# Patient Record
Sex: FEMALE | Race: WHITE | NOT HISPANIC OR LATINO | Employment: OTHER | URBAN - METROPOLITAN AREA
[De-identification: names, ages, dates, MRNs, and addresses within clinical notes are randomized per-mention and may not be internally consistent; named-entity substitution may affect disease eponyms.]

---

## 2017-01-27 ENCOUNTER — OFFICE VISIT (OUTPATIENT)
Dept: FAMILY MEDICINE CLINIC | Facility: CLINIC | Age: 67
End: 2017-01-27

## 2017-01-27 VITALS
BODY MASS INDEX: 28.32 KG/M2 | WEIGHT: 170 LBS | HEART RATE: 65 BPM | OXYGEN SATURATION: 98 % | TEMPERATURE: 98.2 F | HEIGHT: 65 IN | RESPIRATION RATE: 16 BRPM | DIASTOLIC BLOOD PRESSURE: 80 MMHG | SYSTOLIC BLOOD PRESSURE: 120 MMHG

## 2017-01-27 DIAGNOSIS — G89.29 CHRONIC RIGHT SHOULDER PAIN: Primary | ICD-10-CM

## 2017-01-27 DIAGNOSIS — M25.511 CHRONIC RIGHT SHOULDER PAIN: Primary | ICD-10-CM

## 2017-01-27 PROCEDURE — 73030 X-RAY EXAM OF SHOULDER: CPT | Performed by: FAMILY MEDICINE

## 2017-01-27 PROCEDURE — 99213 OFFICE O/P EST LOW 20 MIN: CPT | Performed by: FAMILY MEDICINE

## 2017-01-27 NOTE — PATIENT INSTRUCTIONS
This is a very nice 66-year-old with tendinitis of the right shoulder.  I will ask for a physical therapy consultation but if this does not work I would like her to call me.

## 2017-01-27 NOTE — MR AVS SNAPSHOT
Suly Ware   1/27/2017 8:00 AM   Office Visit    Dept Phone:  [phone]   Encounter #:  [id number]    Provider:  Juan J Watts MD   Department:  [facility]                Your Full Care Plan              Today's Medication Changes          These changes are accurate as of: 1/27/17  8:35 AM.  If you have any questions, ask your nurse or doctor.               Stop taking medication(s)listed here:     mometasone 50 MCG/ACT nasal spray   Commonly known as:  NASONEX   Stopped by:  Juan J Watts MD                      Your Updated Medication List          This list is accurate as of: 1/27/17  8:35 AM.  Always use your most recent med list.                escitalopram 10 MG tablet   Commonly known as:  LEXAPRO       metroNIDAZOLE 1 % gel   Commonly known as:  METROGEL               We Performed the Following     Ambulatory Referral to Physical Therapy     XR Shoulder 2+ View Right (In Office)       You Were Diagnosed With        Codes Comments    Chronic right shoulder pain    -  Primary ICD-10-CM: M25.511, G89.29  ICD-9-CM: 719.41, 338.29       Instructions    This is a very nice 66-year-old with tendinitis of the right shoulder.  I will ask for a physical therapy consultation but if this does not work I would like her to call me.     Patient Instructions History      Upcoming Appointments     Visit Type Date Time Department    OFFICE VISIT 1/27/2017  8:00 AM [employer]    MAMMO KALEB SCREEN BILAT 2/10/2017  7:15 AM  [facility] [employer]      JANZZhart Signup     Our records indicate that you have an active [employer] account.    You can view your After Visit Summary by going to Press Play and logging in with your Zimride username and password.  If you don't have a Zimride username and password but a parent or guardian has access to your record, the parent or guardian should login with their own Zimride username and password and  "access your record to view the After Visit Summary.    If you have questions, you can email [email or web address] or call [phone] to talk to our MyChart staff.  Remember, Audio[employer] is NOT to be used for urgent needs.  For medical emergencies, dial 911.               Other Info from Your Visit           Your Appointments     Feb 10, 2017  7:15 AM EST   Mammo jelani screen bilat with JELANI MAMM 2   [facility] ([place])    [address] [facility] [address] [phone]            Bring any films and reports from outside facilities. Arrive 15 min prior to exam time. Do not apply any lotions, oils, body sprays, powders, perfumes, or deodorants on the day of the exam. Bring a current list of medications. Arrive 15 mi              Allergies     Sulfites        Reason for Visit     Shoulder Pain right shoulder x 2 mo       Vital Signs     Blood Pressure Pulse Temperature Respirations Height Weight    120/80 65 98.2 °F (36.8 °C) 16 65.2\" (165.6 cm) 170 lb (77.1 kg)    Oxygen Saturation Breastfeeding? Body Mass Index Smoking Status          98% No 28.12 kg/m2 Former Smoker        Problems and Diagnoses Noted     Chronic right shoulder pain      Results     XR Shoulder 2+ View Right (In Office)               "

## 2017-01-27 NOTE — PROGRESS NOTES
Subjective   Suly Ware is a 66 y.o. female presenting with   Chief Complaint   Patient presents with   • Shoulder Pain     right shoulder x 2 mo         HPI Comments: 66-year-old white female nonsmoker says that 6 years ago she had frozen left shoulder that was completely cured by physical therapy.  She does not remember any particular trauma, but she does do a lot of traveling with a 15-20 pound suitcase that she drags behind her through airports.  She says occasionally she will catch it on something and has felt some discomfort in her right shoulder.  Now she haspain and limited range of motion of the right shoulder.    She does not have any chest pain or shortness of breath.  She does not have any leg swelling.  She does have a history of deep vein thrombosis 15 years ago but says this does not feel anything like that.  She does not have any cough.  She also does not have any numbness or weakness in her right arm and she does not complain of any neck pain.    To further evaluate her complaint of restricted range of motion and chronic pain of her right shoulder I have requested a shoulder x-ray.  There are no old ones to compare this to.this x-ray does not show any problem.       The following portions of the patient's history were reviewed and updated as appropriate: current medications, past family history, past medical history, past social history, past surgical history and problem list.    Review of Systems   Musculoskeletal: Positive for arthralgias.   All other systems reviewed and are negative.      Objective   Physical Exam   Constitutional: She is oriented to person, place, and time. She appears well-developed and well-nourished. No distress.   HENT:   Head: Normocephalic and atraumatic.   Eyes: EOM are normal. Pupils are equal, round, and reactive to light.   Neck: Normal range of motion. Neck supple. No thyromegaly present.   Cardiovascular: Normal rate and regular rhythm.    Pulmonary/Chest:  Effort normal and breath sounds normal. No respiratory distress. She has no wheezes.   Musculoskeletal: She exhibits tenderness (she has tenderness to attempted internal and external rotation and extensionith limited range of motion.  There is no deformity of her shoulder or gross instability or erythema). She exhibits no edema or deformity.   Lymphadenopathy:     She has no cervical adenopathy.   Neurological: She is alert and oriented to person, place, and time. No cranial nerve deficit. Coordination normal.   Skin: Skin is warm.   Psychiatric: She has a normal mood and affect. Her behavior is normal.   Nursing note and vitals reviewed.      Assessment/Plan   Suly was seen today for shoulder pain.    Diagnoses and all orders for this visit:    Chronic right shoulder pain  -     XR Shoulder 2+ View Right (In Office)                   I would like him to return for another visit in 6 month(s)

## 2017-02-01 ENCOUNTER — HOSPITAL ENCOUNTER (OUTPATIENT)
Dept: PHYSICAL THERAPY | Facility: HOSPITAL | Age: 67
Setting detail: THERAPIES SERIES
Discharge: HOME | End: 2017-02-01

## 2017-02-01 DIAGNOSIS — Z78.9 DIFFICULTY WITH ACTIVITIES OF DAILY LIVING: ICD-10-CM

## 2017-02-01 DIAGNOSIS — M25.511 RIGHT SHOULDER PAIN, UNSPECIFIED CHRONICITY: Primary | ICD-10-CM

## 2017-02-01 PROCEDURE — 97162 PT EVAL MOD COMPLEX 30 MIN: CPT | Performed by: PHYSICAL THERAPIST

## 2017-02-01 PROCEDURE — 97110 THERAPEUTIC EXERCISES: CPT | Performed by: PHYSICAL THERAPIST

## 2017-02-01 NOTE — PROGRESS NOTES
Outpatient Physical Therapy Ortho Initial Evaluation/Treatment  [facility]     Patient Name: Suly Ware  : 1950  MRN: [id number]  Today's Date: 2017      Visit Date: 2017    Patient Active Problem List   Diagnosis   • Blues   • Headache, migraine   • FO (foramen ovale)   • PE (pulmonary embolism)   • DVT (deep venous thrombosis)   • Chronic right shoulder pain        Past Medical History   Diagnosis Date   • DVT (deep venous thrombosis)    • Pulmonary embolus         No past surgical history on file.    Visit Dx:     ICD-10-CM ICD-9-CM   1. Right shoulder pain, unspecified chronicity M25.511 719.41   2. Difficulty with activities of daily living R53.81 799.3             Patient History       17 0800          History    Chief Complaint Difficulty with daily activities;Pain  -MP      Type of Pain Shoulder pain  -MP      Date Current Problem(s) Began 16  -MP      Brief Description of Current Complaint Jessika reports pain mainly in the R shoulder, R trapezius and into the lower upper arm.   She reports no numbness and tingling.  She has difficulty with daily activites to include activities overhead, donning/doffing a sleeve, reaching into cabinets. lifting things and bearing weight into the R UE..    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 For leisure she enjoys knitting, crocheting and reading.  -MP      Patient seeing anyone else for problem(s)? Yes   Her PCP  -MP      How has patient tried to help current problem? Tried doing prevous rehab exercises for L frozen shoulder.  -MP      What clinical tests  have you had for this problem? X-ray  -MP      Results of Clinical Tests No issues  -MP      Pain     Pain Location Arm;Neck;Shoulder  -MP      Pain at Present 0  -MP      Pain at Best 0  -MP      Pain at Worst 7  -MP      Pain Frequency Intermittent  -MP      Pain Description Discomfort;Sharp;Stabbing  -MP      What Performance Factors Make the Current Problem(s) WORSE? Lifting, overhead activites, reaching, donning/doffing a sleeve  -MP      What Performance Factors Make the Current Problem(s) BETTER? zero  -MP      Fall Risk Assessment    Any falls in the past year: No  -MP      Services    Prior Rehab/Home Health Experiences No  -MP      Do you plan to receive Home Health services in the near future No  -MP      Daily Activities    Primary Language English  -MP      Pt Participated in POC and Goals Yes  -MP      Safety    Are you being hurt, hit, or frightened by anyone at home or in your life? No  -MP      Are you being neglected by a caregiver No  -MP        User Key  (r) = Recorded By, (t) = Taken By, (c) = Cosigned By    Initials Name Provider Type    MP Medardo Simmons, PT Physical Therapist                PT Ortho       02/01/17 1100    Subjective Pain    Able to rate subjective pain? yes  -MP    Pre-Treatment Pain Level 0  -MP    Post-Treatment Pain Level 1  -MP    Posture/Observations    Posture/Observations Comments In standing, lateral view of spine, forward head, Dowarger's at the CT junction, increased thoracic kyphosis and lumbr lordosis WNL.  Posterior view of spine displays R shoulder, scapula and ilium lower than the L.  A slight scoliosis with lumbar concavity on R and R rib hump with trunk flexion.  -MP    Sensation    Sensation WNL? WNL  -MP    Light Touch No apparent deficits   C2-T2 dermatomes  -MP    Additional Comments DTRs: B UEs were WNL/equal, Upper Motor Neuron Tests: Cogwheel Clunus and Dawson's sign were normal  -MP    Special Tests/Palpation    Special Tests/Palpation --   C spine  compression/decompression were normal  -MP    ROM (Range of Motion)    General ROM Detail In standing, shoulder elevation, scapular plane, L 161 degrees, R 147 degrees with end range pain, behind the neck L T3 spinous process, R T2 spinous process and behind the back L T4 spinous process and R T8 spinous process with end range pain.  PROM, supine, flexion L 165 degrees, capsular, R 132 degrees empty, ER L 87 degrees capsular, R 28 degrees spasm, Abduction L 146 degrees capsular, 115 degrees empty, IR 57 degrees capsular, R 50 degrees empty, Extension L 73 degrees capsular and R 70 degreees capslular  -MP    Pathomechanics    Pathomechanics Comments Palpation to the distal muscle belly of the R supraspiinatus was very tender.  -MP      User Key  (r) = Recorded By, (t) = Taken By, (c) = Cosigned By    Initials Name Provider Type    ZHOU Simmons PT Physical Therapist                Therapy Education       02/01/17 1142    Therapy Education    Given HEP  -MP    Program New  -MP    How Provided Written  -MP    Provided to Patient  -MP    Level of Understanding Other (comment)   written/illustrated instructions for PROM, R shoulder, flexion and wand ER were givien  -MP      User Key  (r) = Recorded By, (t) = Taken By, (c) = Cosigned By    Initials Name Provider Type    ZHOU Simmons PT Physical Therapist                PT OP Goals       02/01/17 1100          PT Short Term Goals    STG Date to Achieve 03/03/17  -MP      STG 1 Jessika progress her HEP with sleeper's stretch to improve passive IR range.  -MP      STG 1 Progress New  -MP      STG 2 Scapular and postural AROM/stretching activites are introduced to her therapeutic exercises.  -MP      STG 2 Progress New  -MP      STG 3 SPADI score improves to below 20% disability.  -MP      STG 3 Progress New  -MP      Long Term Goals    LTG Date to Achieve 03/31/17  -MP      LTG 1 Jessika has no pain with normal ADL performance with the R shoulder.  -MP      LTG 1 Progress  New  -MP      LTG 2 AROM and PROM of the R shoulder equals the L.  -MP      LTG 2 Progress New  -MP      LTG 3 Jessika is independent with a complete HEP and self care education for the R shoulder.  -MP      LTG 3 Progress New  -MP      LTG 4 SPADI score improves to below 10% disability.  -MP      LTG 4 Progress New  -MP      Time Calculation    PT Goal Re-Cert Due Date 03/03/17  -MP        User Key  (r) = Recorded By, (t) = Taken By, (c) = Cosigned By    Initials Name Provider Type    ZHOU Simmons PT Physical Therapist                PT Assessment/Plan       02/01/17 1148          PT Assessment    Functional Limitations Decreased safety during functional activities;Limitations in community activities;Limitations in functional capacity and performance;Performance in leisure activities  -MP      Impairments Pain;Range of motion;Posture;Impaired muscle length;Joint mobility;Poor body mechanics  -MP      Assessment Comments R shoulder strain possibly involving the R rotator cuff  -MP      Please refer to paper survey for additional self-reported information Yes  -MP      Rehab Potential Good  -MP      Patient/caregiver participated in establishment of treatment plan and goals Yes  -MP      Patient would benefit from skilled therapy intervention Yes  -MP      PT Plan    PT Frequency 2x/week  -MP      Predicted Duration of Therapy Intervention (days/wks) 4-8 weeks  -MP      Planned CPT's? PT EVAL: 23448;PT RE-EVAL: 92748;PT THER PROC EA 15 MIN: 24172;PT MANUAL THERAPY EA 15 MIN: 09352;PT SELF CARE/HOME MGMT/TRAIN EA 15: 40975;PT ELECTRICAL STIM UNATTEND: ;PT ULTRASOUND EA 15 MIN: 99820  -MP      PT Plan Comments Jessika is to progress her therapeutic exercises with the sleeper stretch, passive IR of the R shoulder, next visit.  -MP        User Key  (r) = Recorded By, (t) = Taken By, (c) = Cosigned By    Initials Name Provider Type    ZHOU Simmons PT Physical Therapist                Exercises       02/01/17 1100           Subjective Pain    Able to rate subjective pain? yes  -MP      Pre-Treatment Pain Level 0  -MP      Post-Treatment Pain Level 1  -MP      Exercise 1    Exercise Name 1 Refer to land flow sheet  -MP        User Key  (r) = Recorded By, (t) = Taken By, (c) = Cosigned By    Initials Name Provider Type    ZHOU Simmons PT Physical Therapist                 Outcome Measures       02/01/17 1100          Other Outcome Measure Tool Used    Other Outcome Measure Tool Comments SPADI 35/80 for a 27% disability  -MP      Functional Assessment    Outcome Measure Options Other Outcome Measure  -MP        User Key  (r) = Recorded By, (t) = Taken By, (c) = Cosigned By    Initials Name Provider Type    ZHOU Simmons PT Physical Therapist            Time Calculation:   Start Time: 0845  Stop Time: 0930  Time Calculation (min): 45 min     Therapy Charges for Today     Code Description Service Date Service Provider Modifiers Qty    54610205488 HC PT EVAL MOD COMPLEXITY 2 2/1/2017 Medardo Simmons PT GP 1    33627293291 HC PT THER PROC EA 15 MIN 2/1/2017 Medardo Simmons PT GP 1          PT G-Codes  Outcome Measure Options: Other Outcome Measure         Medardo Simmons PT  2/1/2017

## 2017-02-09 ENCOUNTER — HOSPITAL ENCOUNTER (OUTPATIENT)
Dept: PHYSICAL THERAPY | Facility: HOSPITAL | Age: 67
Setting detail: THERAPIES SERIES
Discharge: HOME | End: 2017-02-09

## 2017-02-09 DIAGNOSIS — Z78.9 DIFFICULTY WITH ACTIVITIES OF DAILY LIVING: ICD-10-CM

## 2017-02-09 DIAGNOSIS — M25.511 RIGHT SHOULDER PAIN, UNSPECIFIED CHRONICITY: Primary | ICD-10-CM

## 2017-02-09 PROCEDURE — 97140 MANUAL THERAPY 1/> REGIONS: CPT | Performed by: PHYSICAL THERAPIST

## 2017-02-09 PROCEDURE — 97110 THERAPEUTIC EXERCISES: CPT | Performed by: PHYSICAL THERAPIST

## 2017-02-09 NOTE — PROGRESS NOTES
Outpatient Physical Therapy Ortho Treatment Note  [facility]     Patient Name: Suly Ware  : 1950  MRN: [id number]  Today's Date: 2017      Visit Date: 2017    Visit Dx:    ICD-10-CM ICD-9-CM   1. Right shoulder pain, unspecified chronicity M25.511 719.41   2. Difficulty with activities of daily living R53.81 799.3       Patient Active Problem List   Diagnosis   • Blues   • Headache, migraine   • FO (foramen ovale)   • PE (pulmonary embolism)   • DVT (deep venous thrombosis)   • Chronic right shoulder pain        Past Medical History   Diagnosis Date   • DVT (deep venous thrombosis)    • Pulmonary embolus         No past surgical history on file.            PT Assessment/Plan       17 1504          PT Assessment    Assessment Comments Jessika tolerated treatment well.  She progressed therapeutic exercises and her HEP.  -MP      PT Plan    PT Plan Comments Begin re-educating scapular stabilizers and start behind the back stretch next visit  -MP        User Key  (r) = Recorded By, (t) = Taken By, (c) = Cosigned By    Initials Name Provider Type    ZHOU Simmons, PT Physical Therapist                Exercises       17 1500          Subjective Comments    Subjective Comments Jessika stated that passive flexion has improved but ER is stubborn.  -MP      Subjective Pain    Able to rate subjective pain? yes  -MP      Pre-Treatment Pain Level 0  -MP      Post-Treatment Pain Level 1  -MP      Exercise 1    Exercise Name 1 Refer to land flow sheet  -MP      Exercise 2    Exercise Name 2 Progressed therapeutic exercises with passive IR, sleeper's stretch, and pulley flexion and scaption.    -MP        User Key  (r) = Recorded By, (t) = Taken By, (c) = Cosigned By    Initials Name Provider Type    ZHOU Simmons, PT Physical Therapist             Manual Rx (last 36 hours)      Manual Treatments       17 1500          Manual Rx 1    Manual Rx 1 Location R Glenohumeral Joint  -MP       Manual Rx 1 Type oscillations  -MP      Manual Rx 1 Duration 5 minutes  -MP      Manual Rx 2    Manual Rx 2 Location R glenohumeral joint  -MP      Manual Rx 2 Type inferior, posterior and anterior glides  -MP      Manual Rx 2 Grade 3  -MP      Manual Rx 2 Duration 5 x 3 each  -MP        User Key  (r) = Recorded By, (t) = Taken By, (c) = Cosigned By    Initials Name Provider Type    MP Medardo Simmons, PT Physical Therapist                PT OP Goals       02/09/17 1500 02/01/17 1100       PT Short Term Goals    STG Date to Achieve 03/03/17  -MP 03/03/17  -MP     STG 1 Jessika progress her HEP with sleeper's stretch to improve passive IR range.  -MP Jessika progress her HEP with sleeper's stretch to improve passive IR range.  -MP     STG 1 Progress Met  -MP New  -MP     STG 2 Scapular and postural AROM/stretching activites are introduced to her therapeutic exercises.  -MP Scapular and postural AROM/stretching activites are introduced to her therapeutic exercises.  -MP     STG 2 Progress Ongoing  -MP New  -MP     STG 3 SPADI score improves to below 20% disability.  -MP SPADI score improves to below 20% disability.  -MP     STG 3 Progress Ongoing  -MP New  -MP     Long Term Goals    LTG Date to Achieve 03/31/17  -MP 03/31/17  -MP     LTG 1 Jessika has no pain with normal ADL performance with the R shoulder.  -MP Jessika has no pain with normal ADL performance with the R shoulder.  -MP     LTG 1 Progress Ongoing  -MP New  -MP     LTG 2 AROM and PROM of the R shoulder equals the L.  -MP AROM and PROM of the R shoulder equals the L.  -MP     LTG 2 Progress Ongoing  -MP New  -MP     LTG 3 Jessika is independent with a complete HEP and self care education for the R shoulder.  -MP Jessika is independent with a complete HEP and self care education for the R shoulder.  -MP     LTG 3 Progress Ongoing  -MP New  -MP     LTG 4 SPADI score improves to below 10% disability.  -MP SPADI score improves to below 10% disability.  -MP     LTG 4  Progress Ongoing  -MP New  -MP     Time Calculation    PT Goal Re-Cert Due Date  03/03/17  -MP       User Key  (r) = Recorded By, (t) = Taken By, (c) = Cosigned By    Initials Name Provider Type    ZHOU Simmons PT Physical Therapist                Therapy Education       02/09/17 1503    Therapy Education    Given HEP  -MP    Program New  -MP    How Provided Written  -MP    Provided to Patient  -MP    Level of Understanding Other (comment)   Passive IR stretch, pulley flexion and scaption added to her HEP.  -MP      User Key  (r) = Recorded By, (t) = Taken By, (c) = Cosigned By    Initials Name Provider Type    ZHOU Simmons PT Physical Therapist          Time Calculation:   Start Time: 1415  Stop Time: 1500  Time Calculation (min): 45 min    Therapy Charges for Today     Code Description Service Date Service Provider Modifiers Qty    09383185617 HC PT THER PROC EA 15 MIN 2/9/2017 Medardo Simmons PT GP 2    28388569261 HC PT MANUAL THERAPY EA 15 MIN 2/9/2017 Medardo Simmons PT GP 1          Medardo Simmons PT  2/9/2017

## 2017-02-10 ENCOUNTER — HOSPITAL ENCOUNTER (OUTPATIENT)
Dept: MAMMOGRAPHY | Facility: HOSPITAL | Age: 67
Discharge: HOME | End: 2017-02-10
Attending: OBSTETRICS & GYNECOLOGY | Admitting: OBSTETRICS & GYNECOLOGY

## 2017-02-10 DIAGNOSIS — Z12.31 VISIT FOR SCREENING MAMMOGRAM: ICD-10-CM

## 2017-02-10 PROCEDURE — G0202 SCR MAMMO BI INCL CAD: HCPCS

## 2017-02-13 ENCOUNTER — HOSPITAL ENCOUNTER (OUTPATIENT)
Dept: PHYSICAL THERAPY | Facility: HOSPITAL | Age: 67
Setting detail: THERAPIES SERIES
Discharge: HOME | End: 2017-02-13

## 2017-02-13 DIAGNOSIS — Z78.9 DIFFICULTY WITH ACTIVITIES OF DAILY LIVING: ICD-10-CM

## 2017-02-13 DIAGNOSIS — M25.511 RIGHT SHOULDER PAIN, UNSPECIFIED CHRONICITY: Primary | ICD-10-CM

## 2017-02-13 PROCEDURE — 97140 MANUAL THERAPY 1/> REGIONS: CPT | Performed by: PHYSICAL THERAPIST

## 2017-02-13 PROCEDURE — 97110 THERAPEUTIC EXERCISES: CPT | Performed by: PHYSICAL THERAPIST

## 2017-02-13 NOTE — PROGRESS NOTES
Outpatient Physical Therapy Ortho Treatment Note  [facility]     Patient Name: Suly Ware  : 1950  MRN: [id number]  Today's Date: 2017      Visit Date: 2017    Visit Dx:    ICD-10-CM ICD-9-CM   1. Right shoulder pain, unspecified chronicity M25.511 719.41   2. Difficulty with activities of daily living R53.81 799.3       Patient Active Problem List   Diagnosis   • Blues   • Headache, migraine   • FO (foramen ovale)   • PE (pulmonary embolism)   • DVT (deep venous thrombosis)   • Chronic right shoulder pain        Past Medical History   Diagnosis Date   • DVT (deep venous thrombosis)    • Pulmonary embolus         No past surgical history on file.            PT Assessment/Plan       17 0852 17 1504       PT Assessment    Assessment Comments Suly is noticing flexibility improvements.  She tolerated introduction to scapular retraction and ER PREs without increasing pain afterwards.  -MP Jessika tolerated treatment well.  She progressed therapeutic exercises and her HEP.  -MP     PT Plan    PT Plan Comments Progress her HEP with the scapular rowing, green or blue theraband, and sidelying ER with dumbell, 2 or 3 lbs., if she does well between now and 2017.  -MP Begin re-educating scapular stabilizers and start behind the back stretch next visit  -MP       User Key  (r) = Recorded By, (t) = Taken By, (c) = Cosigned By    Initials Name Provider Type    ZHOU Simmons, PT Physical Therapist                    Exercises       17 0800          Subjective Comments    Subjective Comments Jessika reports seeing improved flexibility but a little more soreness today.  She assisted in chores at her home over the weekend and the end result was increased soreness.  -MP      Subjective Pain    Able to rate subjective pain? yes  -MP      Pre-Treatment Pain Level 2  -MP      Exercise 1    Exercise Name 1 Refer to land flow sheet  -MP      Exercise 2    Exercise Name 2  Progressed therapeutic exercises with pulley behind the back stretch 30 s x 5, scapular rowing with red theraband 10 x 3 and sidelying ER PRE with 2 lbs. 10 x 3.  -MP        User Key  (r) = Recorded By, (t) = Taken By, (c) = Cosigned By    Initials Name Provider Type    MP Medardo Simmons, PT Physical Therapist             Manual Rx (last 36 hours)      Manual Treatments       02/13/17 0700          Manual Rx 1    Manual Rx 1 Location R Glenohumeral Joint  -MP      Manual Rx 1 Type oscillations  -MP      Manual Rx 1 Duration 5 minutes  -MP      Manual Rx 2    Manual Rx 2 Location R glenohumeral joint  -MP      Manual Rx 2 Type inferior, posterior and anterior glides  -MP      Manual Rx 2 Grade 3  -MP      Manual Rx 2 Duration 5 x 3 each  -MP        User Key  (r) = Recorded By, (t) = Taken By, (c) = Cosigned By    Initials Name Provider Type    ZHOU Simmons, PT Physical Therapist                PT OP Goals       02/13/17 0800 02/09/17 1500 02/01/17 1100    PT Short Term Goals    STG Date to Achieve 03/03/17  -MP 03/03/17  -MP 03/03/17  -MP    STG 1 Jessika progress her HEP with sleeper's stretch to improve passive IR range.  -MP Jessika progress her HEP with sleeper's stretch to improve passive IR range.  -MP Jessika progress her HEP with sleeper's stretch to improve passive IR range.  -MP    STG 1 Progress Met  -MP Met  -MP New  -MP    STG 2 Scapular and postural AROM/stretching activites are introduced to her therapeutic exercises.  -MP Scapular and postural AROM/stretching activites are introduced to her therapeutic exercises.  -MP Scapular and postural AROM/stretching activites are introduced to her therapeutic exercises.  -MP    STG 2 Progress Met  -MP Ongoing  -MP New  -MP    STG 3 SPADI score improves to below 20% disability.  -MP SPADI score improves to below 20% disability.  -MP SPADI score improves to below 20% disability.  -MP    STG 3 Progress Ongoing  -MP Ongoing  -MP New  -MP    Long Term Goals    LTG Date to  Achieve 03/31/17  -MP 03/31/17  -MP 03/31/17  -MP    LTG 1 Jessika has no pain with normal ADL performance with the R shoulder.  -MP Jessika has no pain with normal ADL performance with the R shoulder.  -MP Jessika has no pain with normal ADL performance with the R shoulder.  -MP    LTG 1 Progress Ongoing  -MP Ongoing  -MP New  -MP    LTG 2 AROM and PROM of the R shoulder equals the L.  -MP AROM and PROM of the R shoulder equals the L.  -MP AROM and PROM of the R shoulder equals the L.  -MP    LTG 2 Progress Ongoing  -MP Ongoing  -MP New  -MP    LTG 3 Jessika is independent with a complete HEP and self care education for the R shoulder.  -MP Jessika is independent with a complete HEP and self care education for the R shoulder.  -MP Jessika is independent with a complete HEP and self care education for the R shoulder.  -MP    LTG 3 Progress Ongoing  -MP Ongoing  -MP New  -MP    LTG 4 SPADI score improves to below 10% disability.  -MP SPADI score improves to below 10% disability.  -MP SPADI score improves to below 10% disability.  -MP    LTG 4 Progress Ongoing  -MP Ongoing  -MP New  -MP    Time Calculation    PT Goal Re-Cert Due Date   03/03/17  -MP      User Key  (r) = Recorded By, (t) = Taken By, (c) = Cosigned By    Initials Name Provider Type    ZHOU Simmons PT Physical Therapist                Therapy Education       02/13/17 0850    Therapy Education    Given HEP  -MP    Program New  -MP    How Provided Written  -MP    Provided to Patient  -MP    Level of Understanding Other (comment)   Behind the back stretch was added to the HEP.  -MP      02/09/17 1503    Therapy Education    Given HEP  -MP    Program New  -MP    How Provided Written  -MP    Provided to Patient  -MP    Level of Understanding Other (comment)   Passive IR stretch, pulley flexion and scaption added to her HEP.  -MP      User Key  (r) = Recorded By, (t) = Taken By, (c) = Cosigned By    Initials Name Provider Type    ZHOU Simmons PT Physical Therapist         Time Calculation:   Start Time: 0800  Stop Time: 0845  Time Calculation (min): 45 min    Therapy Charges for Today     Code Description Service Date Service Provider Modifiers Qty    11010588221 HC PT THER PROC EA 15 MIN 2/13/2017 Medardo Simmons, PT GP 2    84353369139 HC PT MANUAL THERAPY EA 15 MIN 2/13/2017 Medardo Simmons PT GP 1          Medardo Simmons, PT  2/13/2017

## 2017-02-16 ENCOUNTER — HOSPITAL ENCOUNTER (OUTPATIENT)
Dept: PHYSICAL THERAPY | Facility: HOSPITAL | Age: 67
Setting detail: THERAPIES SERIES
Discharge: HOME | End: 2017-02-16

## 2017-02-16 DIAGNOSIS — M25.511 RIGHT SHOULDER PAIN, UNSPECIFIED CHRONICITY: Primary | ICD-10-CM

## 2017-02-16 DIAGNOSIS — Z78.9 DIFFICULTY WITH ACTIVITIES OF DAILY LIVING: ICD-10-CM

## 2017-02-16 PROCEDURE — 97140 MANUAL THERAPY 1/> REGIONS: CPT | Performed by: PHYSICAL THERAPIST

## 2017-02-16 PROCEDURE — 97110 THERAPEUTIC EXERCISES: CPT | Performed by: PHYSICAL THERAPIST

## 2017-02-16 NOTE — PROGRESS NOTES
Outpatient Physical Therapy Ortho Treatment Note  [facility]     Patient Name: Suly Ware  : 1950  MRN: [id number]  Today's Date: 2017      Visit Date: 2017    Visit Dx:    ICD-10-CM ICD-9-CM   1. Right shoulder pain, unspecified chronicity M25.511 719.41   2. Difficulty with activities of daily living R53.81 799.3       Patient Active Problem List   Diagnosis   • Blues   • Headache, migraine   • FO (foramen ovale)   • PE (pulmonary embolism)   • DVT (deep venous thrombosis)   • Chronic right shoulder pain        Past Medical History   Diagnosis Date   • DVT (deep venous thrombosis)    • Pulmonary embolus         No past surgical history on file.                PT Assessment/Plan       17 0941 17 0852 17 1504    PT Assessment    Assessment Comments Jessika is responding well to current treatment.  No problems with theraband activities today.  -MP Suly is noticing flexibility improvements.  She tolerated introduction to scapular retraction and ER PREs without increasing pain afterwards.  -MP Jessika tolerated treatment well.  She progressed therapeutic exercises and her HEP.  -MP    PT Plan    PT Plan Comments She is out of town for one week and will resume PT when she is back in town  -MP Progress her HEP with the scapular rowing, green or blue theraband, and sidelying ER with dumbell, 2 or 3 lbs., if she does well between now and 2017.  -MP Begin re-educating scapular stabilizers and start behind the back stretch next visit  -MP      User Key  (r) = Recorded By, (t) = Taken By, (c) = Cosigned By    Initials Name Provider Type    ZHOU Simmons, PT Physical Therapist                Exercises       17 0900          Subjective Comments    Subjective Comments Jessika reported having scapular soreness after her last visit that was helped with a cold pack.  Since then she has not had pain.  -MP      Subjective Pain    Able to rate subjective pain? yes   -MP      Pre-Treatment Pain Level 0  -MP      Post-Treatment Pain Level 1  -MP      Exercise 1    Exercise Name 1 Refer to land flow sheet  -MP      Exercise 2    Exercise Name 2 Progressed therapeutic exercises with resisted rowing red theraband 10 x 2 and standing ER, red theraband, 10 x 3.  -MP        User Key  (r) = Recorded By, (t) = Taken By, (c) = Cosigned By    Initials Name Provider Type    MP Medardo Simmons, PT Physical Therapist                  PT OP Goals       02/16/17 0900 02/13/17 0800 02/09/17 1500    PT Short Term Goals    STG Date to Achieve 03/03/17  -MP 03/03/17  -MP 03/03/17  -MP    STG 1 Jessika progress her HEP with sleeper's stretch to improve passive IR range.  -MP Jessika progress her HEP with sleeper's stretch to improve passive IR range.  -MP Jessika progress her HEP with sleeper's stretch to improve passive IR range.  -MP    STG 1 Progress Met  -MP Met  -MP Met  -MP    STG 2 Scapular and postural AROM/stretching activites are introduced to her therapeutic exercises.  -MP Scapular and postural AROM/stretching activites are introduced to her therapeutic exercises.  -MP Scapular and postural AROM/stretching activites are introduced to her therapeutic exercises.  -MP    STG 2 Progress Met  -MP Met  -MP Ongoing  -MP    STG 3 SPADI score improves to below 20% disability.  -MP SPADI score improves to below 20% disability.  -MP SPADI score improves to below 20% disability.  -MP    STG 3 Progress Ongoing  -MP Ongoing  -MP Ongoing  -MP    Long Term Goals    LTG Date to Achieve 03/31/17  -MP 03/31/17  -MP 03/31/17  -MP    LTG 1 Jessika has no pain with normal ADL performance with the R shoulder.  -MP Jessika has no pain with normal ADL performance with the R shoulder.  -MP Jessika has no pain with normal ADL performance with the R shoulder.  -MP    LTG 1 Progress Ongoing  -MP Ongoing  -MP Ongoing  -MP    LTG 2 AROM and PROM of the R shoulder equals the L.  -MP AROM and PROM of the R shoulder equals the L.  -MP  AROM and PROM of the R shoulder equals the L.  -MP    LTG 2 Progress Ongoing  -MP Ongoing  -MP Ongoing  -MP    LTG 3 Jessika is independent with a complete HEP and self care education for the R shoulder.  -MP Jessika is independent with a complete HEP and self care education for the R shoulder.  -MP Jessika is independent with a complete HEP and self care education for the R shoulder.  -MP    LTG 3 Progress Ongoing  -MP Ongoing  -MP Ongoing  -MP    LTG 4 SPADI score improves to below 10% disability.  -MP SPADI score improves to below 10% disability.  -MP SPADI score improves to below 10% disability.  -MP    LTG 4 Progress Ongoing  -MP Ongoing  -MP Ongoing  -MP      User Key  (r) = Recorded By, (t) = Taken By, (c) = Cosigned By    Initials Name Provider Type    ZHOU Simmons PT Physical Therapist                Therapy Education       02/16/17 0941    Therapy Education    Given HEP  -MP    Program New  -MP    How Provided Written  -MP    Provided to Patient  -MP    Level of Understanding Other (comment)   Issued red theraband for her to do rowing and ER 3 x per week  -MP      02/13/17 0850    Therapy Education    Given HEP  -MP    Program New  -MP    How Provided Written  -MP    Provided to Patient  -MP    Level of Understanding Other (comment)   Behind the back stretch was added to the HEP.  -MP      02/09/17 1503    Therapy Education    Given HEP  -MP    Program New  -MP    How Provided Written  -MP    Provided to Patient  -MP    Level of Understanding Other (comment)   Passive IR stretch, pulley flexion and scaption added to her HEP.  -MP      User Key  (r) = Recorded By, (t) = Taken By, (c) = Cosigned By    Initials Name Provider Type    ZHOU Simmons PT Physical Therapist        Time Calculation:   Start Time: 0845  Stop Time: 0930  Time Calculation (min): 45 min    Therapy Charges for Today     Code Description Service Date Service Provider Modifiers Qty    39719830060 HC PT MANUAL THERAPY EA 15 MIN 2/16/2017  Medardo Simmons, PT GP 1    46567495802 HC PT THER PROC EA 15 MIN 2/16/2017 Medardo Simmons, PT GP 2          Medardo Simmons, PT  2/16/2017

## 2017-02-20 ENCOUNTER — OFFICE VISIT (OUTPATIENT)
Dept: FAMILY MEDICINE CLINIC | Facility: CLINIC | Age: 67
End: 2017-02-20

## 2017-02-20 VITALS
WEIGHT: 169.3 LBS | OXYGEN SATURATION: 98 % | HEIGHT: 65 IN | DIASTOLIC BLOOD PRESSURE: 78 MMHG | TEMPERATURE: 99.2 F | RESPIRATION RATE: 16 BRPM | HEART RATE: 76 BPM | BODY MASS INDEX: 28.21 KG/M2 | SYSTOLIC BLOOD PRESSURE: 118 MMHG

## 2017-02-20 DIAGNOSIS — J11.1 INFLUENZA: Primary | ICD-10-CM

## 2017-02-20 DIAGNOSIS — R68.89 FLU-LIKE SYMPTOMS: Primary | ICD-10-CM

## 2017-02-20 LAB
EXPIRATION DATE: ABNORMAL
FLUAV AG NPH QL: ABNORMAL
FLUBV AG NPH QL: ABNORMAL
INTERNAL CONTROL: ABNORMAL
Lab: ABNORMAL

## 2017-02-20 PROCEDURE — 99213 OFFICE O/P EST LOW 20 MIN: CPT | Performed by: INTERNAL MEDICINE

## 2017-02-20 PROCEDURE — 87804 INFLUENZA ASSAY W/OPTIC: CPT | Performed by: INTERNAL MEDICINE

## 2017-02-20 RX ORDER — GUAIFENESIN AND CODEINE PHOSPHATE 100; 10 MG/5ML; MG/5ML
5 SOLUTION ORAL 3 TIMES DAILY PRN
Qty: 180 ML | Refills: 0 | Status: SHIPPED | OUTPATIENT
Start: 2017-02-20 | End: 2017-05-24

## 2017-02-20 RX ORDER — OSELTAMIVIR PHOSPHATE 75 MG/1
75 CAPSULE ORAL 2 TIMES DAILY
Qty: 10 CAPSULE | Refills: 0 | Status: SHIPPED | OUTPATIENT
Start: 2017-02-20 | End: 2017-05-24

## 2017-02-20 NOTE — PROGRESS NOTES
"Subjective   Patient ID: Suly Ware is a 66 y.o. female presents with   Chief Complaint   Patient presents with   • URI     day 10   • Fever     body aches, cough       HPI - this patient has been sick for a few days with fever congestion cough headache body ache.  She travels on airplanes frequently.  She tested positive for influenza A    Assessment plan    Influenza A-Tamiflu 75 mg twice daily for 5 days.  Codeine guaifenesin cough syrup Tylenol fluids and rest        Allergies   Allergen Reactions   • Sulfites        The following portions of the patient's history were reviewed and updated as appropriate: allergies, current medications, past family history, past medical history, past social history, past surgical history and problem list.      Review of Systems   Constitutional: Positive for fatigue and fever.   HENT: Positive for congestion.    Eyes: Negative.    Respiratory: Positive for cough.    Cardiovascular: Negative.        Objective     Vitals:    02/20/17 1258   BP: 118/78   Pulse: 76   Resp: 16   Temp: 99.2 °F (37.3 °C)   TempSrc: Oral   SpO2: 98%   Weight: 169 lb 4.8 oz (76.8 kg)   Height: 65\" (165.1 cm)         Physical Exam   Constitutional: She is oriented to person, place, and time. She appears well-developed and well-nourished.   HENT:   Head: Normocephalic and atraumatic.   Eyes: EOM are normal. Pupils are equal, round, and reactive to light.   Cardiovascular: Normal rate and regular rhythm.    Pulmonary/Chest: Effort normal and breath sounds normal.   Neurological: She is alert and oriented to person, place, and time.   Psychiatric: She has a normal mood and affect. Her behavior is normal.   Nursing note and vitals reviewed.        Suly was seen today for uri and fever.    Diagnoses and all orders for this visit:    Influenza    Other orders  -     oseltamivir (TAMIFLU) 75 MG capsule; Take 1 capsule by mouth 2 (Two) Times a Day.  -     guaifenesin-codeine (GUAIFENESIN AC) 100-10 " MG/5ML liquid; Take 5 mL by mouth 3 (Three) Times a Day As Needed for cough.        Call or return to clinic prn if these symptoms worsen or fail to improve as anticipated.

## 2017-02-28 ENCOUNTER — HOSPITAL ENCOUNTER (OUTPATIENT)
Dept: PHYSICAL THERAPY | Facility: HOSPITAL | Age: 67
Setting detail: THERAPIES SERIES
Discharge: HOME | End: 2017-02-28

## 2017-02-28 DIAGNOSIS — Z78.9 DIFFICULTY WITH ACTIVITIES OF DAILY LIVING: ICD-10-CM

## 2017-02-28 DIAGNOSIS — M25.511 RIGHT SHOULDER PAIN, UNSPECIFIED CHRONICITY: Primary | ICD-10-CM

## 2017-02-28 PROCEDURE — 97140 MANUAL THERAPY 1/> REGIONS: CPT | Performed by: PHYSICAL THERAPIST

## 2017-02-28 PROCEDURE — 97110 THERAPEUTIC EXERCISES: CPT | Performed by: PHYSICAL THERAPIST

## 2017-03-02 ENCOUNTER — HOSPITAL ENCOUNTER (OUTPATIENT)
Dept: PHYSICAL THERAPY | Facility: HOSPITAL | Age: 67
Setting detail: THERAPIES SERIES
Discharge: HOME | End: 2017-03-02

## 2017-03-02 DIAGNOSIS — Z78.9 DIFFICULTY WITH ACTIVITIES OF DAILY LIVING: ICD-10-CM

## 2017-03-02 DIAGNOSIS — M25.511 RIGHT SHOULDER PAIN, UNSPECIFIED CHRONICITY: Primary | ICD-10-CM

## 2017-03-02 PROCEDURE — 97140 MANUAL THERAPY 1/> REGIONS: CPT | Performed by: PHYSICAL THERAPIST

## 2017-03-02 PROCEDURE — 97110 THERAPEUTIC EXERCISES: CPT | Performed by: PHYSICAL THERAPIST

## 2017-03-02 NOTE — PROGRESS NOTES
Outpatient Physical Therapy Ortho Treatment Note  [facility]     Patient Name: Suly Ware  : 1950  MRN: [id number]  Today's Date: 3/2/2017      Visit Date: 2017    Visit Dx:    ICD-10-CM ICD-9-CM   1. Right shoulder pain, unspecified chronicity M25.511 719.41   2. Difficulty with activities of daily living R53.81 799.3       Patient Active Problem List   Diagnosis   • Headache, migraine   • PE (pulmonary embolism)   • DVT (deep venous thrombosis)   • Chronic right shoulder pain   • Influenza        Past Medical History   Diagnosis Date   • DVT (deep venous thrombosis)    • Pulmonary embolus         No past surgical history on file.          PT Ortho       17 0900    Subjective Comments    Subjective Comments Jessika reports that she is noticing functional improvements with the R shoulder to include reaching from the 's seat towards the passenger seat and donning and doffing her coat sleeve more normally.  Pain occurs with some movements of the R shoulder with ADLs.  -MP    Subjective Pain    Able to rate subjective pain? yes  -MP    Pre-Treatment Pain Level 0  -MP    ROM (Range of Motion)    General ROM Detail In standing, functional AROM R shoulder, elevation in the scapular teressa e 160 degrees, Behind the neck T3 spinous process and behind the back T8 spinous process.  PROM R shoulder in supine, flexion 160 degrees, empty end feel, Abduction 110 degrees with empty end feel, ER 38 degrees with empty end feel, IR 50 degrees with capsular end feel and extension 84 degrees with empty end feel.  -MP      User Key  (r) = Recorded By, (t) = Taken By, (c) = Cosigned By    Initials Name Provider Type    ZHOU Simmons, PT Physical Therapist                PT Assessment/Plan       17 1144       PT Assessment    Assessment Comments Jessika noticed no problems with standing shoulder extesnion with red theraband and noticed improved tolerance and range with behind the back stretch.  -MP      PT Plan    PT Plan Comments Continue progressing shoulder therapeutic exercises and manual therapy as tolerated.  -MP       User Key  (r) = Recorded By, (t) = Taken By, (c) = Cosigned By    Initials Name Provider Type    ZHOU Simmons, PT Physical Therapist                Exercises       03/02/17 1100          Subjective Comments    Subjective Comments Jessika reported that she noticed getting a bit sore and tight after getting back into the flow of doing exercises regularly again.  -MP      Subjective Pain    Able to rate subjective pain? yes  -MP      Pre-Treatment Pain Level 0  -MP      Post-Treatment Pain Level 0  -MP      Exercise 1    Exercise Name 1 Refer to land flow sheet  -MP      Exercise 2    Exercise Name 2 Progressed therapeutic exercises with standing shoulder extension using red theraband, standing, 10 x 3 B.  -MP        User Key  (r) = Recorded By, (t) = Taken By, (c) = Cosigned By    Initials Name Provider Type    ZHOU Simmons, PT Physical Therapist             Manual Rx (last 36 hours)      Manual Treatments       03/02/17 1100          Manual Rx 1    Manual Rx 1 Location R Glenohumeral Joint  -MP      Manual Rx 1 Type oscillations  -MP      Manual Rx 1 Duration 5 minutes  -MP      Manual Rx 2    Manual Rx 2 Location R glenohumeral joint  -MP      Manual Rx 2 Type inferior, posterior and anterior glides  -MP      Manual Rx 2 Grade 3  -MP      Manual Rx 2 Duration 5 x 3 each  -MP        User Key  (r) = Recorded By, (t) = Taken By, (c) = Cosigned By    Initials Name Provider Type    ZHOU Simmons PT Physical Therapist                PT OP Goals       03/02/17 1100       PT Short Term Goals    STG Date to Achieve 03/03/17  -MP     STG 1 Jessika progress her HEP with sleeper's stretch to improve passive IR range.  -MP     STG 1 Progress Met  -MP     STG 2 Scapular and postural AROM/stretching activites are introduced to her therapeutic exercises.  -MP     STG 2 Progress Met  -MP     STG 3 SPADI  score improves to below 20% disability.  -MP     STG 3 Progress Ongoing  -MP     Long Term Goals    LTG Date to Achieve 03/31/17  -MP     LTG 1 Jessika has no pain with normal ADL performance with the R shoulder.  -MP     LTG 1 Progress Ongoing  -MP     LTG 2 AROM and PROM of the R shoulder equals the L.  -MP     LTG 2 Progress Ongoing  -MP     LTG 3 Jessika is independent with a complete HEP and self care education for the R shoulder.  -MP     LTG 3 Progress Ongoing  -MP     LTG 4 SPADI score improves to below 10% disability.  -MP     LTG 4 Progress Ongoing  -MP       User Key  (r) = Recorded By, (t) = Taken By, (c) = Cosigned By    Initials Name Provider Type    ZHUO Simmons PT Physical Therapist                Therapy Education       03/02/17 1133          Therapy Education    Given HEP  -MP      Program New  -MP      How Provided Written  -MP      Provided to Patient  -MP      Level of Understanding Other (comment)   Progressed her HEP with standing shoulder extension, red theraband, 3 x per week.  -MP        User Key  (r) = Recorded By, (t) = Taken By, (c) = Cosigned By    Initials Name Provider Type    ZHOU Simmons PT Physical Therapist        Time Calculation:   Start Time: 1100  Stop Time: 1145  Time Calculation (min): 45 min    Therapy Charges for Today     Code Description Service Date Service Provider Modifiers Qty    02545314989 HC PT THER PROC EA 15 MIN 3/2/2017 Medardo Simmons PT GP 2    31509884439 HC PT MANUAL THERAPY EA 15 MIN 3/2/2017 Medardo Simmons PT GP 1        Medardo Simmons PT  3/2/2017

## 2017-03-06 ENCOUNTER — HOSPITAL ENCOUNTER (OUTPATIENT)
Dept: PHYSICAL THERAPY | Facility: HOSPITAL | Age: 67
Setting detail: THERAPIES SERIES
Discharge: HOME | End: 2017-03-06

## 2017-03-06 DIAGNOSIS — M25.511 RIGHT SHOULDER PAIN, UNSPECIFIED CHRONICITY: Primary | ICD-10-CM

## 2017-03-06 DIAGNOSIS — Z78.9 DIFFICULTY WITH ACTIVITIES OF DAILY LIVING: ICD-10-CM

## 2017-03-06 PROCEDURE — 97140 MANUAL THERAPY 1/> REGIONS: CPT | Performed by: PHYSICAL THERAPIST

## 2017-03-06 PROCEDURE — 97110 THERAPEUTIC EXERCISES: CPT | Performed by: PHYSICAL THERAPIST

## 2017-03-06 NOTE — PROGRESS NOTES
Outpatient Physical Therapy Ortho Treatment Note  [facility]     Patient Name: Suly Ware  : 1950  MRN: [id number]  Today's Date: 3/6/2017      Visit Date: 2017    Visit Dx:    ICD-10-CM ICD-9-CM   1. Right shoulder pain, unspecified chronicity M25.511 719.41   2. Difficulty with activities of daily living R53.81 799.3       Patient Active Problem List   Diagnosis   • Headache, migraine   • PE (pulmonary embolism)   • DVT (deep venous thrombosis)   • Chronic right shoulder pain   • Influenza        Past Medical History   Diagnosis Date   • DVT (deep venous thrombosis)    • Pulmonary embolus         No past surgical history on file.            PT Assessment/Plan       17 0856       PT Assessment    Assessment Comments Jessika noticed mild discomfort while performing behind the back and the corner stretch but had no sustained pain afterwards.  She is progressing with therapeutic exercises and tolerared resisted elevation today without pain.  -MP     PT Plan    PT Plan Comments Continue progressing PREs as she tolerates, use manual therapy to help maintain joint mobility and progress self care education to prepare for eventual self rehab.    -MP       User Key  (r) = Recorded By, (t) = Taken By, (c) = Cosigned By    Initials Name Provider Type    ZHOU Simmons, PT Physical Therapist                Exercises       17 0800          Subjective Comments    Subjective Comments Jessika reports that the only discomfort she experiences with the R shoulder in low intensity twinges intermittlently, every couple of hours.    -MP      Subjective Pain    Able to rate subjective pain? yes  -MP      Pre-Treatment Pain Level 0  -MP      Exercise 1    Exercise Name 1 Refer to land flow sheet  -MP      Exercise 2    Exercise Name 2 Progressed therapeutic exercises with green theraband for standing rows 10 x 3, standing lat pulls 10 x 3 and standing ER 10 x 3.  She also did overhead presses with 1  lbs. 10 x 2 B and began corner stretches 30 s x 5.  -MP        User Key  (r) = Recorded By, (t) = Taken By, (c) = Cosigned By    Initials Name Provider Type    ZHOU Simmons PT Physical Therapist             Manual Rx (last 36 hours)      Manual Treatments       03/06/17 0700          Manual Rx 1    Manual Rx 1 Location R Glenohumeral Joint  -MP      Manual Rx 1 Type oscillations  -MP      Manual Rx 1 Duration 5 minutes  -MP      Manual Rx 2    Manual Rx 2 Location R glenohumeral joint  -MP      Manual Rx 2 Type inferior, posterior and anterior glides  -MP      Manual Rx 2 Grade 3  -MP      Manual Rx 2 Duration 5 x 3 each  -MP        User Key  (r) = Recorded By, (t) = Taken By, (c) = Cosigned By    Initials Name Provider Type    ZHOU Simmons PT Physical Therapist                PT OP Goals       03/06/17 0800       PT Short Term Goals    STG Date to Achieve 03/03/17  -MP     STG 1 Jessika progress her HEP with sleeper's stretch to improve passive IR range.  -MP     STG 1 Progress Met  -MP     STG 2 Scapular and postural AROM/stretching activites are introduced to her therapeutic exercises.  -MP     STG 2 Progress Met  -MP     STG 3 SPADI score improves to below 20% disability.  -MP     STG 3 Progress Ongoing  -MP     Long Term Goals    LTG Date to Achieve 03/31/17  -MP     LTG 1 Jessika has no pain with normal ADL performance with the R shoulder.  -MP     LTG 1 Progress Ongoing  -MP     LTG 2 AROM and PROM of the R shoulder equals the L.  -MP     LTG 2 Progress Ongoing  -MP     LTG 3 Jessika is independent with a complete HEP and self care education for the R shoulder.  -MP     LTG 3 Progress Ongoing  -MP     LTG 4 SPADI score improves to below 10% disability.  -MP     LTG 4 Progress Ongoing  -MP       User Key  (r) = Recorded By, (t) = Taken By, (c) = Cosigned By    Initials Name Provider Type    ZHOU Simmons PT Physical Therapist                Therapy Education       03/06/17 0855          Therapy Education     Given HEP  -MP      Program New  -MP      How Provided Written  -MP      Provided to Patient  -MP      Level of Understanding Other (comment)   Corner stretch was added to her HEP.  -MP        User Key  (r) = Recorded By, (t) = Taken By, (c) = Cosigned By    Initials Name Provider Type    ZHOU Simmons PT Physical Therapist        Time Calculation:   Start Time: 0800  Stop Time: 0845  Time Calculation (min): 45 min    Therapy Charges for Today     Code Description Service Date Service Provider Modifiers Qty    87480199982  PT THER PROC EA 15 MIN 3/6/2017 Medardo Simmons PT GP 2    [id number]  PT MANUAL THERAPY EA 15 MIN 3/6/2017 Medardo Simmons PT GP 1          Medardo Simmons PT  3/6/2017

## 2017-03-08 ENCOUNTER — HOSPITAL ENCOUNTER (OUTPATIENT)
Dept: PHYSICAL THERAPY | Facility: HOSPITAL | Age: 67
Setting detail: THERAPIES SERIES
Discharge: HOME | End: 2017-03-08

## 2017-03-08 DIAGNOSIS — Z78.9 DIFFICULTY WITH ACTIVITIES OF DAILY LIVING: ICD-10-CM

## 2017-03-08 DIAGNOSIS — M25.511 RIGHT SHOULDER PAIN, UNSPECIFIED CHRONICITY: Primary | ICD-10-CM

## 2017-03-08 PROCEDURE — 97110 THERAPEUTIC EXERCISES: CPT | Performed by: PHYSICAL THERAPIST

## 2017-03-08 NOTE — PROGRESS NOTES
Outpatient Physical Therapy Ortho Treatment Note  [facility]     Patient Name: Suly Ware  : 1950  MRN: [id number]  Today's Date: 3/8/2017      Visit Date: 2017    Visit Dx:    ICD-10-CM ICD-9-CM   1. Right shoulder pain, unspecified chronicity M25.511 719.41   2. Difficulty with activities of daily living R53.81 799.3       Patient Active Problem List   Diagnosis   • Headache, migraine   • PE (pulmonary embolism)   • DVT (deep venous thrombosis)   • Chronic right shoulder pain   • Influenza        Past Medical History   Diagnosis Date   • DVT (deep venous thrombosis)    • Pulmonary embolus         No past surgical history on file.          PT Ortho       17 0800    ROM (Range of Motion)    General ROM Detail PROM R shoulder IR 70 degrees  -MP      User Key  (r) = Recorded By, (t) = Taken By, (c) = Cosigned By    Initials Name Provider Type    ZHOU Simmons, PT Physical Therapist                            PT Assessment/Plan       17 0854       PT Assessment    Assessment Comments Jessika noticed an increase in passive IR of 20 degrees, after manual therapy and therapeutic exercises, as compared to her re-eval measurement.  -MP     PT Plan    PT Plan Comments Continue progressing therapeutic exercises and education  -MP       User Key  (r) = Recorded By, (t) = Taken By, (c) = Cosigned By    Initials Name Provider Type    ZHOU Simmons, PT Physical Therapist                Exercises       17 0800          Subjective Comments    Subjective Comments Jessika reports that after releasing her arm when stretching behind the back she no longer feels pain.  -MP      Subjective Pain    Able to rate subjective pain? yes  -MP      Pre-Treatment Pain Level 1  -MP      Exercise 1    Exercise Name 1 Refer to land flow sheet  -MP      Exercise 2    Exercise Name 2 Progressed therapeutic exercises with prone scapular W and prone superwoman, both 5 x 3, and standing overhead press 2 lbs.  10 x 2.  -MP        User Key  (r) = Recorded By, (t) = Taken By, (c) = Cosigned By    Initials Name Provider Type    ZHOU Simmons PT Physical Therapist             Manual Rx (last 36 hours)      Manual Treatments       03/08/17 0700          Manual Rx 2    Manual Rx 2 Location R glenohumeral joint  -MP      Manual Rx 2 Type inferior, posterior and anterior glides  -MP      Manual Rx 2 Grade 3-4  -MP      Manual Rx 2 Duration 5 x 3  -MP        User Key  (r) = Recorded By, (t) = Taken By, (c) = Cosigned By    Initials Name Provider Type    ZHOU Simmons PT Physical Therapist                PT OP Goals       03/08/17 0800       PT Short Term Goals    STG Date to Achieve 03/03/17  -MP     STG 1 Jessika progress her HEP with sleeper's stretch to improve passive IR range.  -MP     STG 1 Progress Met  -MP     STG 2 Scapular and postural AROM/stretching activites are introduced to her therapeutic exercises.  -MP     STG 2 Progress Met  -MP     STG 3 SPADI score improves to below 20% disability.  -MP     STG 3 Progress Ongoing  -MP     Long Term Goals    LTG Date to Achieve 03/31/17  -MP     LTG 1 Jessika has no pain with normal ADL performance with the R shoulder.  -MP     LTG 1 Progress Ongoing  -MP     LTG 2 AROM and PROM of the R shoulder equals the L.  -MP     LTG 2 Progress Ongoing  -MP     LTG 3 Jessika is independent with a complete HEP and self care education for the R shoulder.  -MP     LTG 3 Progress Ongoing  -MP     LTG 4 SPADI score improves to below 10% disability.  -MP     LTG 4 Progress Ongoing  -MP       User Key  (r) = Recorded By, (t) = Taken By, (c) = Cosigned By    Initials Name Provider Type    ZHOU Simmons PT Physical Therapist                Therapy Education       03/08/17 0853          Therapy Education    Given HEP  -MP      Program New  -MP      How Provided Verbal  -MP      Provided to Patient  -MP      Level of Understanding Other (comment)   Prone scapular superwoman and W added to her  HEP.  -MP        User Key  (r) = Recorded By, (t) = Taken By, (c) = Cosigned By    Initials Name Provider Type    ZHOU Simmons PT Physical Therapist          Time Calculation:   Start Time: 0800  Stop Time: 0845  Time Calculation (min): 45 min    Therapy Charges for Today     Code Description Service Date Service Provider Modifiers Qty    15961712041  PT THER PROC EA 15 MIN 3/8/2017 Medardo Simmons PT GP 3          Medardo Simmons PT  3/8/2017

## 2017-03-13 ENCOUNTER — HOSPITAL ENCOUNTER (OUTPATIENT)
Dept: PHYSICAL THERAPY | Facility: HOSPITAL | Age: 67
Setting detail: THERAPIES SERIES
Discharge: HOME | End: 2017-03-13

## 2017-03-13 DIAGNOSIS — M25.511 RIGHT SHOULDER PAIN, UNSPECIFIED CHRONICITY: Primary | ICD-10-CM

## 2017-03-13 DIAGNOSIS — Z78.9 DIFFICULTY WITH ACTIVITIES OF DAILY LIVING: ICD-10-CM

## 2017-03-13 PROCEDURE — 97110 THERAPEUTIC EXERCISES: CPT | Performed by: PHYSICAL THERAPIST

## 2017-03-13 NOTE — PROGRESS NOTES
Outpatient Physical Therapy Ortho Treatment Note  [facility]     Patient Name: Suly Ware  : 1950  MRN: [id number]  Today's Date: 3/13/2017      Visit Date: 2017    Visit Dx:    ICD-10-CM ICD-9-CM   1. Right shoulder pain, unspecified chronicity M25.511 719.41   2. Difficulty with activities of daily living R53.81 799.3       Patient Active Problem List   Diagnosis   • Headache, migraine   • PE (pulmonary embolism)   • DVT (deep venous thrombosis)   • Chronic right shoulder pain   • Influenza        Past Medical History   Diagnosis Date   • DVT (deep venous thrombosis)    • Pulmonary embolus         No past surgical history on file.              PT Assessment/Plan       17 0848       PT Assessment    Assessment Comments Progressing therapeutic PREs and doing well   -MP     PT Plan    PT Plan Comments Continue progressing exercises and reisistive levels as she tolerates.  -MP       User Key  (r) = Recorded By, (t) = Taken By, (c) = Cosigned By    Initials Name Provider Type    ZHOU Simmons PT Physical Therapist                    Exercises       17 0800          Subjective Comments    Subjective Comments Jessika reports   -MP      Subjective Pain    Able to rate subjective pain? yes  -MP      Pre-Treatment Pain Level 0  -MP      Post-Treatment Pain Level 0  -MP      Exercise 1    Exercise Name 1 Refer to land flow sheet  -MP      Exercise 2    Exercise Name 2 Progressed standing rows 12.5 lbs. 10 x 3, standing shoulder extension with bar, 15 x 3, prone scapular W and super woman 10 x 2.    -MP        User Key  (r) = Recorded By, (t) = Taken By, (c) = Cosigned By    Initials Name Provider Type    ZHOU Simmons PT Physical Therapist             Manual Rx (last 36 hours)      Manual Treatments       17 0700          Manual Rx 2    Manual Rx 2 Location R glenohumeral joint  -MP      Manual Rx 2 Type inferior, posterior and anterior glides  -MP      Manual Rx 2 Grade 3-4   -MP      Manual Rx 2 Duration 5 x 3  -MP        User Key  (r) = Recorded By, (t) = Taken By, (c) = Cosigned By    Initials Name Provider Type    ZHOU Simmons PT Physical Therapist                PT OP Goals       03/13/17 0800       PT Short Term Goals    STG Date to Achieve 03/03/17  -MP     STG 1 Jessika progress her HEP with sleeper's stretch to improve passive IR range.  -MP     STG 1 Progress Met  -MP     STG 2 Scapular and postural AROM/stretching activites are introduced to her therapeutic exercises.  -MP     STG 2 Progress Met  -MP     STG 3 SPADI score improves to below 20% disability.  -MP     STG 3 Progress Ongoing  -MP     Long Term Goals    LTG Date to Achieve 03/31/17  -MP     LTG 1 Jessika has no pain with normal ADL performance with the R shoulder.  -MP     LTG 1 Progress Ongoing  -MP     LTG 2 AROM and PROM of the R shoulder equals the L.  -MP     LTG 2 Progress Ongoing  -MP     LTG 3 Jessika is independent with a complete HEP and self care education for the R shoulder.  -MP     LTG 3 Progress Ongoing  -MP     LTG 4 SPADI score improves to below 10% disability.  -MP     LTG 4 Progress Ongoing  -MP       User Key  (r) = Recorded By, (t) = Taken By, (c) = Cosigned By    Initials Name Provider Type    ZHOU Simmons PT Physical Therapist                Therapy Education       03/13/17 0847          Therapy Education    Given HEP  -MP      Program New  -MP      How Provided Verbal  -MP      Provided to Patient  -MP      Level of Understanding Verbalized  -MP        User Key  (r) = Recorded By, (t) = Taken By, (c) = Cosigned By    Initials Name Provider Type    ZHOU Simmons PT Physical Therapist                Time Calculation:   Start Time: 0800  Stop Time: 0845  Time Calculation (min): 45 min    Therapy Charges for Today     Code Description Service Date Service Provider Modifiers Qty    08829909283 HC PT THER PROC EA 15 MIN 3/13/2017 Mdeardo Simmons PT GP 3          Medardo Simmons PT  3/13/2017

## 2017-03-15 ENCOUNTER — HOSPITAL ENCOUNTER (OUTPATIENT)
Dept: PHYSICAL THERAPY | Facility: HOSPITAL | Age: 67
Setting detail: THERAPIES SERIES
Discharge: HOME | End: 2017-03-15

## 2017-03-15 DIAGNOSIS — Z78.9 DIFFICULTY WITH ACTIVITIES OF DAILY LIVING: ICD-10-CM

## 2017-03-15 DIAGNOSIS — M25.511 RIGHT SHOULDER PAIN, UNSPECIFIED CHRONICITY: Primary | ICD-10-CM

## 2017-03-15 PROCEDURE — 97140 MANUAL THERAPY 1/> REGIONS: CPT | Performed by: PHYSICAL THERAPIST

## 2017-03-15 PROCEDURE — 97110 THERAPEUTIC EXERCISES: CPT | Performed by: PHYSICAL THERAPIST

## 2017-03-15 NOTE — PROGRESS NOTES
Outpatient Physical Therapy Ortho Treatment Note  [facility]     Patient Name: Suly Ware  : 1950  MRN: [id number]  Today's Date: 3/15/2017      Visit Date: 03/15/2017    Visit Dx:    ICD-10-CM ICD-9-CM   1. Right shoulder pain, unspecified chronicity M25.511 719.41   2. Difficulty with activities of daily living R53.81 799.3       Patient Active Problem List   Diagnosis   • Headache, migraine   • PE (pulmonary embolism)   • DVT (deep venous thrombosis)   • Chronic right shoulder pain   • Influenza        Past Medical History   Diagnosis Date   • DVT (deep venous thrombosis)    • Pulmonary embolus         No past surgical history on file.          PT Ortho       03/15/17 0800    Subjective Pain    Post-Treatment Pain Level 0  -MP    MMT (Manual Muscle Testing)    General MMT Assessment Detail MMT R shoulder IR 5/5, ER 4-/5 and abduction 4/5  -MP      User Key  (r) = Recorded By, (t) = Taken By, (c) = Cosigned By    Initials Name Provider Type    ZHOU Simmons, PT Physical Therapist                PT Assessment/Plan       03/15/17 0852 03/15/17 0845    PT Assessment    Assessment Comments Her shoulder is progressing but she continues to need skilled physical therapy to progress exercises appropriately.  -MP Jessika is progressing her exercise program in prepartation for self care after next week.  -MP    PT Plan    PT Plan Comments  Continue progressing PREs and education next week.  -MP      User Key  (r) = Recorded By, (t) = Taken By, (c) = Cosigned By    Initials Name Provider Type    ZHOU Simmnos, PT Physical Therapist                Exercises       03/15/17 0800          Subjective Comments    Subjective Comments Jessika reports that her R shoulder continues to improve.  -MP      Subjective Pain    Able to rate subjective pain? yes  -MP      Pre-Treatment Pain Level 0  -MP      Post-Treatment Pain Level 0  -MP      Exercise 1    Exercise Name 1 Refer to land flow sheet  -MP      Exercise 2     Exercise Name 2 Progressed overhead presses to 3 lbs. 5,and 6 repetitions.  ER PRE it side sit position 3 lbs. 10 x 3 B  -MP        User Key  (r) = Recorded By, (t) = Taken By, (c) = Cosigned By    Initials Name Provider Type    ZHOU Simmons PT Physical Therapist             Manual Rx (last 36 hours)      Manual Treatments       03/15/17 0700          Manual Rx 2    Manual Rx 2 Location R glenohumeral joint  -MP      Manual Rx 2 Type inferior, posterior and anterior glides  -MP      Manual Rx 2 Grade 3-4  -MP      Manual Rx 2 Duration 5 x 3  -MP        User Key  (r) = Recorded By, (t) = Taken By, (c) = Cosigned By    Initials Name Provider Type    ZHOU Simmons PT Physical Therapist                PT OP Goals       03/15/17 0800       PT Short Term Goals    STG Date to Achieve 03/03/17  -MP     STG 1 Jessika progress her HEP with sleeper's stretch to improve passive IR range.  -MP     STG 1 Progress Met  -MP     STG 2 Scapular and postural AROM/stretching activites are introduced to her therapeutic exercises.  -MP     STG 2 Progress Met  -MP     STG 3 SPADI score improves to below 20% disability.  -MP     STG 3 Progress Ongoing  -MP     Long Term Goals    LTG Date to Achieve 03/31/17  -MP     LTG 1 Jessika has no pain with normal ADL performance with the R shoulder.  -MP     LTG 1 Progress Ongoing  -MP     LTG 2 AROM and PROM of the R shoulder equals the L.  -MP     LTG 2 Progress Ongoing  -MP     LTG 3 Jessika is independent with a complete HEP and self care education for the R shoulder.  -MP     LTG 3 Progress Ongoing  -MP     LTG 4 SPADI score improves to below 10% disability.  -MP     LTG 4 Progress Ongoing  -MP       User Key  (r) = Recorded By, (t) = Taken By, (c) = Cosigned By    Initials Name Provider Type    ZHOU Simmons PT Physical Therapist                Therapy Education       03/15/17 0844          Therapy Education    Given HEP  -MP      Program Reinforced  -MP      How Provided Verbal  -MP       Provided to Patient  -MP      Level of Understanding Verbalized  -MP        User Key  (r) = Recorded By, (t) = Taken By, (c) = Cosigned By    Initials Name Provider Type    MP Medardo Simmons PT Physical Therapist                Time Calculation:   Start Time: 0800  Stop Time: 0845  Time Calculation (min): 45 min    Therapy Charges for Today     Code Description Service Date Service Provider Modifiers Qty    96999208149  PT MANUAL THERAPY EA 15 MIN 3/15/2017 Medardo Simmons PT GP 1    33112688669 HC PT THER PROC EA 15 MIN 3/15/2017 Medardo Simmons PT GP 2          Medardo Simmons PT  3/15/2017

## 2017-03-20 ENCOUNTER — HOSPITAL ENCOUNTER (OUTPATIENT)
Dept: PHYSICAL THERAPY | Facility: HOSPITAL | Age: 67
Setting detail: THERAPIES SERIES
Discharge: HOME | End: 2017-03-20

## 2017-03-20 DIAGNOSIS — Z78.9 DIFFICULTY WITH ACTIVITIES OF DAILY LIVING: ICD-10-CM

## 2017-03-20 DIAGNOSIS — M25.511 RIGHT SHOULDER PAIN, UNSPECIFIED CHRONICITY: Primary | ICD-10-CM

## 2017-03-20 PROCEDURE — 97140 MANUAL THERAPY 1/> REGIONS: CPT | Performed by: PHYSICAL THERAPIST

## 2017-03-20 PROCEDURE — 97110 THERAPEUTIC EXERCISES: CPT | Performed by: PHYSICAL THERAPIST

## 2017-03-20 NOTE — PROGRESS NOTES
Outpatient Physical Therapy Ortho Treatment Note  [facility]     Patient Name: Suly Ware  : 1950  MRN: [id number]  Today's Date: 3/20/2017      Visit Date: 2017    Visit Dx:    ICD-10-CM ICD-9-CM   1. Right shoulder pain, unspecified chronicity M25.511 719.41   2. Difficulty with activities of daily living R53.81 799.3       Patient Active Problem List   Diagnosis   • Headache, migraine   • PE (pulmonary embolism)   • DVT (deep venous thrombosis)   • Chronic right shoulder pain   • Influenza        Past Medical History   Diagnosis Date   • DVT (deep venous thrombosis)    • Pulmonary embolus         No past surgical history on file.                      PT Assessment/Plan       17 0849       PT Assessment    Assessment Comments Jessika had her elbows too, high when doing scapular W.  After lowering the elbows she felt no pain today.  -MP     PT Plan    PT Plan Comments Reassess, treat and give final instructions next visit.  -MP       User Key  (r) = Recorded By, (t) = Taken By, (c) = Cosigned By    Initials Name Provider Type    ZHOU Simmons, PT Physical Therapist                Exercises       17 0800          Subjective Comments    Subjective Comments Jessika reported noticing pain in R shoulder with prone scapular W.  She also notices some discomfort with the corner stretch.  -MP      Subjective Pain    Able to rate subjective pain? yes  -MP      Pre-Treatment Pain Level 0  -MP      Exercise 1    Exercise Name 1 Refer to land flow sheet  -MP        User Key  (r) = Recorded By, (t) = Taken By, (c) = Cosigned By    Initials Name Provider Type    ZHOU Simmons PT Physical Therapist             Manual Rx (last 36 hours)      Manual Treatments       17 0700          Manual Rx 2    Manual Rx 2 Location R glenohumeral joint  -MP      Manual Rx 2 Type inferior, posterior and anterior glides  -MP      Manual Rx 2 Grade 3-4  -MP      Manual Rx 2 Duration 5 x 3  -MP         User Key  (r) = Recorded By, (t) = Taken By, (c) = Cosigned By    Initials Name Provider Type    ZHOU Simmons PT Physical Therapist                PT OP Goals       03/20/17 0800       PT Short Term Goals    STG Date to Achieve 03/03/17  -MP     STG 1 Jessika progress her HEP with sleeper's stretch to improve passive IR range.  -MP     STG 1 Progress Met  -MP     STG 2 Scapular and postural AROM/stretching activites are introduced to her therapeutic exercises.  -MP     STG 2 Progress Met  -MP     STG 3 SPADI score improves to below 20% disability.  -MP     STG 3 Progress Ongoing  -MP     Long Term Goals    LTG Date to Achieve 03/31/17  -MP     LTG 1 Jessika has no pain with normal ADL performance with the R shoulder.  -MP     LTG 1 Progress Ongoing  -MP     LTG 2 AROM and PROM of the R shoulder equals the L.  -MP     LTG 2 Progress Ongoing  -MP     LTG 3 Jessika is independent with a complete HEP and self care education for the R shoulder.  -MP     LTG 3 Progress Ongoing  -MP     LTG 4 SPADI score improves to below 10% disability.  -MP     LTG 4 Progress Ongoing  -MP       User Key  (r) = Recorded By, (t) = Taken By, (c) = Cosigned By    Initials Name Provider Type    ZHOU Simmons PT Physical Therapist                Therapy Education       03/20/17 0847          Therapy Education    Given HEP  -MP      Program Reinforced  -MP      How Provided Verbal  -MP      Provided to Patient  -MP      Level of Understanding Verbalized  -MP        User Key  (r) = Recorded By, (t) = Taken By, (c) = Cosigned By    Initials Name Provider Type    ZHOU Simmons PT Physical Therapist        Time Calculation:   Start Time: 0800  Stop Time: 0845  Time Calculation (min): 45 min    Therapy Charges for Today     Code Description Service Date Service Provider Modifiers Qty    17774153019 HC PT THER PROC EA 15 MIN 3/20/2017 Medardo Simmons PT GP 2    84908629214 HC PT MANUAL THERAPY EA 15 MIN 3/20/2017 Medardo Simmons PT GP 1               Medardo Simmons, PT  3/20/2017

## 2017-03-22 ENCOUNTER — HOSPITAL ENCOUNTER (OUTPATIENT)
Dept: PHYSICAL THERAPY | Facility: HOSPITAL | Age: 67
Setting detail: THERAPIES SERIES
Discharge: HOME | End: 2017-03-22

## 2017-03-22 DIAGNOSIS — M25.511 RIGHT SHOULDER PAIN, UNSPECIFIED CHRONICITY: Primary | ICD-10-CM

## 2017-03-22 DIAGNOSIS — Z78.9 DIFFICULTY WITH ACTIVITIES OF DAILY LIVING: ICD-10-CM

## 2017-03-22 PROCEDURE — 97110 THERAPEUTIC EXERCISES: CPT | Performed by: PHYSICAL THERAPIST

## 2017-03-22 NOTE — THERAPY DISCHARGE NOTE
Outpatient Physical Therapy Ortho Treatment Note/Discharge Summary  [facility]     Patient Name: Suly Ware  : 1950  MRN: [id number]  Today's Date: 3/22/2017      Visit Date: 2017    Visit Dx:    ICD-10-CM ICD-9-CM   1. Right shoulder pain, unspecified chronicity M25.511 719.41   2. Difficulty with activities of daily living R53.81 799.3       Patient Active Problem List   Diagnosis   • Headache, migraine   • PE (pulmonary embolism)   • DVT (deep venous thrombosis)   • Chronic right shoulder pain   • Influenza        Past Medical History:   Diagnosis Date   • DVT (deep venous thrombosis)    • Pulmonary embolus         No past surgical history on file.          PT Ortho       17 0800    ROM (Range of Motion)    General ROM Detail PROM , supine, flexion 164 degrees with empty end feel, abduction 160 degrees with empty end feel, ER 63 degrees with empty end feel, IR 82 degrees with capsular end feel and extension 76 degrees with capsular end feel.  Functional AROM 164 degrees, behind the neck T3 spinous process and behind the back T6 spinous process  -MP    MMT (Manual Muscle Testing)    General MMT Assessment Detail MMT R shoulder flexion 4/5 with pain, abduction 4-/5 with pain, ER 4-/5, IR 5/5 and extension 4+/5.  -MP      User Key  (r) = Recorded By, (t) = Taken By, (c) = Cosigned By    Initials Name Provider Type    ZHOU Simmons PT Physical Therapist                      Exercises       17 0800          Subjective Comments    Subjective Comments Jessika reports pain at its peak, when doing something that hurts, is now a 3/10  -MP      Subjective Pain    Able to rate subjective pain? yes  -MP      Pre-Treatment Pain Level 0  -MP      Post-Treatment Pain Level 0  -MP      Exercise 1    Exercise Name 1 Refer to land flow sheet  -MP        User Key  (r) = Recorded By, (t) = Taken By, (c) = Cosigned By    Initials Name Provider Type    ZHOU Simmons PT Physical Therapist                 PT OP Goals       03/22/17 0800       PT Short Term Goals    STG Date to Achieve 03/03/17  -MP     STG 1 Jessika progress her HEP with sleeper's stretch to improve passive IR range.  -MP     STG 1 Progress Met  -MP     STG 2 Scapular and postural AROM/stretching activites are introduced to her therapeutic exercises.  -MP     STG 2 Progress Met  -MP     STG 3 SPADI score improves to below 20% disability.  -MP     STG 3 Progress Met  -MP     Long Term Goals    LTG Date to Achieve 03/31/17  -MP     LTG 1 Jessika has no pain with normal ADL performance with the R shoulder.  -MP     LTG 1 Progress Not Met  -MP     LTG 2 AROM and PROM of the R shoulder equals the L.  -MP     LTG 2 Progress Not Met  -MP     LTG 3 Jessika is independent with a complete HEP and self care education for the R shoulder.  -MP     LTG 3 Progress Met  -MP     LTG 4 SPADI score improves to below 10% disability.  -MP     LTG 4 Progress Not Met  -MP       User Key  (r) = Recorded By, (t) = Taken By, (c) = Cosigned By    Initials Name Provider Type    ZHOU Simmons PT Physical Therapist                Therapy Education       03/22/17 1302          Therapy Education    Given HEP  -MP      Program New  -MP      How Provided Written  -MP      Provided to Patient  -MP      Level of Understanding Other (comment)   Jessika is a [facility] and was given an exercise sheet for follow through for her R Shoulder.  -MP        User Key  (r) = Recorded By, (t) = Taken By, (c) = Cosigned By    Initials Name Provider Type    ZHOU Simmons PT Physical Therapist                Outcome Measures       03/22/17 0800          Other Outcome Measure Tool Used    Other Outcome Measure Tool Comments SPADI 16/130, 12% disability  -MP      Functional Assessment    Outcome Measure Options Other Outcome Measure  -MP        User Key  (r) = Recorded By, (t) = Taken By, (c) = Cosigned By    Initials Name Provider Type    ZHOU Simmons PT Physical  Therapist        Time Calculation:   Start Time: 0800  Stop Time: 0845  Time Calculation (min): 45 min    Therapy Charges for Today     Code Description Service Date Service Provider Modifiers Qty    11330350148 HC PT THER PROC EA 15 MIN 3/22/2017 Medardo Simmons, PT GP 3          PT G-Codes  Outcome Measure Options: Other Outcome Measure     OP PT Discharge Summary  Date of Discharge: 03/22/17  Reason for Discharge: Independent  Outcomes Achieved: Patient able to partially acheive established goals  Discharge Destination: Home with home program  Discharge Instructions: Jessika was instructed to continue with her current R shoulder program independently.  I suspect there is some soft tissue lesion in the R shoulder girdle and have recommended to her that if she doesn't see further improvement in the next 3 weeks to consider an orhopedic consult.      Medardo Simmons, PT  3/22/2017

## 2017-05-11 DIAGNOSIS — Z00.00 ANNUAL PHYSICAL EXAM: Primary | ICD-10-CM

## 2017-05-17 ENCOUNTER — RESULTS ENCOUNTER (OUTPATIENT)
Dept: FAMILY MEDICINE CLINIC | Facility: CLINIC | Age: 67
End: 2017-05-17

## 2017-05-17 DIAGNOSIS — Z00.00 ANNUAL PHYSICAL EXAM: ICD-10-CM

## 2017-05-17 LAB
ALBUMIN SERPL-MCNC: 4.1 G/DL (ref 3.5–5.2)
ALBUMIN/GLOB SERPL: 1.6 G/DL
ALP SERPL-CCNC: 62 U/L (ref 39–117)
ALT SERPL-CCNC: 16 U/L (ref 1–33)
APPEARANCE UR: CLEAR
AST SERPL-CCNC: 20 U/L (ref 1–32)
BASOPHILS # BLD AUTO: 0.05 10*3/MM3 (ref 0–0.2)
BASOPHILS NFR BLD AUTO: 0.9 % (ref 0–1.5)
BILIRUB SERPL-MCNC: 0.3 MG/DL (ref 0.1–1.2)
BILIRUB UR QL STRIP: NEGATIVE
BUN SERPL-MCNC: 14 MG/DL (ref 8–23)
BUN/CREAT SERPL: 16.5 (ref 7–25)
CALCIUM SERPL-MCNC: 9.4 MG/DL (ref 8.6–10.5)
CHLORIDE SERPL-SCNC: 105 MMOL/L (ref 98–107)
CHOLEST SERPL-MCNC: 203 MG/DL (ref 0–200)
CO2 SERPL-SCNC: 26.1 MMOL/L (ref 22–29)
COLOR UR: YELLOW
CREAT SERPL-MCNC: 0.85 MG/DL (ref 0.57–1)
EOSINOPHIL # BLD AUTO: 0.1 10*3/MM3 (ref 0–0.7)
EOSINOPHIL NFR BLD AUTO: 1.9 % (ref 0.3–6.2)
ERYTHROCYTE [DISTWIDTH] IN BLOOD BY AUTOMATED COUNT: 13.9 % (ref 11.7–13)
GLOBULIN SER CALC-MCNC: 2.6 GM/DL
GLUCOSE SERPL-MCNC: 100 MG/DL (ref 65–99)
GLUCOSE UR QL: NEGATIVE
HCT VFR BLD AUTO: 38.8 % (ref 35.6–45.5)
HDLC SERPL-MCNC: 63 MG/DL (ref 40–60)
HGB BLD-MCNC: 12.1 G/DL (ref 11.9–15.5)
HGB UR QL STRIP: ABNORMAL
IMM GRANULOCYTES # BLD: 0 10*3/MM3 (ref 0–0.03)
IMM GRANULOCYTES NFR BLD: 0 % (ref 0–0.5)
KETONES UR QL STRIP: NEGATIVE
LDLC SERPL CALC-MCNC: 118 MG/DL (ref 0–100)
LEUKOCYTE ESTERASE UR QL STRIP: NEGATIVE
LYMPHOCYTES # BLD AUTO: 1.84 10*3/MM3 (ref 0.9–4.8)
LYMPHOCYTES NFR BLD AUTO: 34.8 % (ref 19.6–45.3)
MCH RBC QN AUTO: 30.9 PG (ref 26.9–32)
MCHC RBC AUTO-ENTMCNC: 31.2 G/DL (ref 32.4–36.3)
MCV RBC AUTO: 99 FL (ref 80.5–98.2)
MONOCYTES # BLD AUTO: 0.4 10*3/MM3 (ref 0.2–1.2)
MONOCYTES NFR BLD AUTO: 7.6 % (ref 5–12)
NEUTROPHILS # BLD AUTO: 2.9 10*3/MM3 (ref 1.9–8.1)
NEUTROPHILS NFR BLD AUTO: 54.8 % (ref 42.7–76)
NITRITE UR QL STRIP: NEGATIVE
PH UR STRIP: 6.5 [PH] (ref 5–8)
PLATELET # BLD AUTO: 308 10*3/MM3 (ref 140–500)
POTASSIUM SERPL-SCNC: 4.4 MMOL/L (ref 3.5–5.2)
PROT SERPL-MCNC: 6.7 G/DL (ref 6–8.5)
PROT UR QL STRIP: NEGATIVE
RBC # BLD AUTO: 3.92 10*6/MM3 (ref 3.9–5.2)
SODIUM SERPL-SCNC: 144 MMOL/L (ref 136–145)
SP GR UR: <1.005 (ref 1–1.03)
TRIGL SERPL-MCNC: 109 MG/DL (ref 0–150)
TSH SERPL DL<=0.005 MIU/L-ACNC: 1.98 MIU/ML (ref 0.27–4.2)
UROBILINOGEN UR STRIP-MCNC: ABNORMAL MG/DL
VLDLC SERPL CALC-MCNC: 21.8 MG/DL (ref 5–40)
WBC # BLD AUTO: 5.29 10*3/MM3 (ref 4.5–10.7)

## 2017-05-24 ENCOUNTER — OFFICE VISIT (OUTPATIENT)
Dept: FAMILY MEDICINE CLINIC | Facility: CLINIC | Age: 67
End: 2017-05-24

## 2017-05-24 VITALS
TEMPERATURE: 98.3 F | HEIGHT: 65 IN | SYSTOLIC BLOOD PRESSURE: 126 MMHG | WEIGHT: 171 LBS | BODY MASS INDEX: 28.49 KG/M2 | HEART RATE: 93 BPM | DIASTOLIC BLOOD PRESSURE: 82 MMHG | OXYGEN SATURATION: 98 %

## 2017-05-24 DIAGNOSIS — R53.82 CHRONIC FATIGUE: ICD-10-CM

## 2017-05-24 DIAGNOSIS — G89.29 CHRONIC RIGHT SHOULDER PAIN: Primary | ICD-10-CM

## 2017-05-24 DIAGNOSIS — Z00.00 ANNUAL PHYSICAL EXAM: ICD-10-CM

## 2017-05-24 DIAGNOSIS — M25.511 CHRONIC RIGHT SHOULDER PAIN: Primary | ICD-10-CM

## 2017-05-24 PROCEDURE — 99396 PREV VISIT EST AGE 40-64: CPT | Performed by: FAMILY MEDICINE

## 2017-05-24 PROCEDURE — 93000 ELECTROCARDIOGRAM COMPLETE: CPT | Performed by: FAMILY MEDICINE

## 2017-06-09 ENCOUNTER — APPOINTMENT (OUTPATIENT)
Dept: BONE DENSITY | Facility: HOSPITAL | Age: 67
End: 2017-06-09

## 2017-06-13 ENCOUNTER — TRANSCRIBE ORDERS (OUTPATIENT)
Dept: GASTROENTEROLOGY | Facility: CLINIC | Age: 67
End: 2017-06-13

## 2017-06-13 DIAGNOSIS — Z12.11 ENCOUNTER FOR SCREENING FOR MALIGNANT NEOPLASM OF COLON: Primary | ICD-10-CM

## 2017-06-16 ENCOUNTER — HOSPITAL ENCOUNTER (OUTPATIENT)
Dept: BONE DENSITY | Facility: HOSPITAL | Age: 67
Discharge: HOME | End: 2017-06-16
Admitting: FAMILY MEDICINE

## 2017-06-16 PROCEDURE — 77080 DXA BONE DENSITY AXIAL: CPT

## 2017-06-19 ENCOUNTER — OFFICE VISIT (OUTPATIENT)
Dept: CARDIOLOGY | Facility: CLINIC | Age: 67
End: 2017-06-19

## 2017-06-19 VITALS
WEIGHT: 169 LBS | RESPIRATION RATE: 16 BRPM | HEART RATE: 70 BPM | HEIGHT: 63 IN | SYSTOLIC BLOOD PRESSURE: 118 MMHG | BODY MASS INDEX: 29.95 KG/M2 | DIASTOLIC BLOOD PRESSURE: 80 MMHG

## 2017-06-19 DIAGNOSIS — R06.09 DYSPNEA ON EXERTION: ICD-10-CM

## 2017-06-19 DIAGNOSIS — R53.82 CHRONIC FATIGUE: ICD-10-CM

## 2017-06-19 DIAGNOSIS — I27.82 OTHER CHRONIC PULMONARY EMBOLISM WITHOUT ACUTE COR PULMONALE (HCC): Primary | ICD-10-CM

## 2017-06-19 PROCEDURE — 99204 OFFICE O/P NEW MOD 45 MIN: CPT | Performed by: INTERNAL MEDICINE

## 2017-06-19 PROCEDURE — 93000 ELECTROCARDIOGRAM COMPLETE: CPT | Performed by: INTERNAL MEDICINE

## 2017-06-19 NOTE — PROGRESS NOTES
PATIENTINFORMATION    Date of Office Visit: 2017  Encounter Provider: Tenisha Whittington MD  Place of Service: [facility] CARDIOLOGY  Patient Name: Suly Ware  : 1950    Subjective:     Encounter Date:2017      Patient ID: Suly Ware is a 66 y.o. female.      History of Present Illness     This is a nice lady who had a left leg deep venous thrombosis with a pulmonary embolus after on Coreg while she was on some estrogen replacement.  She has had a workup for hypercoagulable state and it was negative.  She was getting her yearly examination and mentioned to Dr. Watts that she feels tired and fatigued all the time.  She just does not feel like she has her normal energy level.  She is short of breath with exertion.  She is cold when she is still and lays down.  This has been going on for about three months and she feels like it is getting worse.  She also complains of indigestion and waking up in the night with chest pain and indigestion symptoms and has to sit up and take an antacid.        Review of Systems   Constitution: Positive for malaise/fatigue. Negative for fever, weight gain and weight loss.   HENT: Negative for ear pain, hearing loss, nosebleeds and sore throat.    Eyes: Negative for double vision, pain, vision loss in left eye and vision loss in right eye.   Cardiovascular:        See history of present illness.   Respiratory: Negative for cough, shortness of breath, sleep disturbances due to breathing, snoring and wheezing.    Endocrine: Positive for cold intolerance. Negative for heat intolerance and polyuria.   Skin: Negative for itching, poor wound healing and rash.   Musculoskeletal: Negative for joint pain, joint swelling and myalgias.   Gastrointestinal: Positive for abdominal pain and heartburn. Negative for diarrhea, hematochezia, nausea and vomiting.   Genitourinary: Negative for hematuria and hesitancy.   Neurological: Negative for  "numbness, paresthesias and seizures.   Psychiatric/Behavioral: Negative for depression. The patient is not nervous/anxious.            ECG 12 Lead  Date/Time: 6/19/2017 2:43 PM  Performed by: JEFF HERNANDEZ  Authorized by: JEFF HERNANDEZ   Comparison: compared with previous ECG from 5/24/2017  Similar to previous ECG  Rhythm: sinus rhythm  BPM: 70  Conduction: conduction normal  ST Segments: ST segments normal  T Waves: T waves normal  Clinical impression: normal ECG               Objective:     /80 (BP Location: Right arm, Patient Position: Sitting, Cuff Size: Adult)  Pulse 70  Resp 16  Ht 63\" (160 cm)  Wt 169 lb (76.7 kg)  BMI 29.94 kg/m2 Body mass index is 29.94 kg/(m^2).     Physical Exam   Constitutional: She appears well-developed.   HENT:   Head: Normocephalic and atraumatic.   Eyes: Conjunctivae and lids are normal. Pupils are equal, round, and reactive to light. Lids are everted and swept, no foreign bodies found.   Neck: Normal range of motion. No JVD present. Carotid bruit is not present. No tracheal deviation present. No thyroid mass present.   Cardiovascular: Normal rate, regular rhythm and normal heart sounds.    Pulses:       Dorsalis pedis pulses are 2+ on the right side, and 2+ on the left side.   Pulmonary/Chest: Effort normal and breath sounds normal.   Abdominal: Normal appearance and bowel sounds are normal.   Musculoskeletal: Normal range of motion.   Neurological: She is alert. She has normal strength.   Skin: Skin is warm, dry and intact.   Psychiatric: She has a normal mood and affect. Her behavior is normal.   Vitals reviewed.          Assessment/Plan:        1. Fatigue and dyspnea on exertion.  I am certainly concerned that this could be an ischemic symptom in this lady.  I recommend stress echocardiogram for further evaluation.  If that is normal, she could consider a pulmonary evaluation with chest x-ray and pulmonary function test, but I would feel a lot more comfortable " telling her to exercise and push herself if her stress test was normal.    2. History of deep venous thrombosis and pulmonary embolism.    3. Gastroesophageal reflux disease.  I suggested that she try an over-the-counter Pepcid or proton pump inhibitor and take it before bedtime.      I will call her and go over the results of her stress echocardiogram when they are available.        Orders Placed This Encounter   Procedures   • ECG 12 Lead     This order was created via procedure documentation      Suly Ware   Home Medication Instructions HOLLIE:    Printed on:06/19/17 3205   Medication Information                      metroNIDAZOLE (METROGEL) 1 % gel  Apply topically daily.                        Tenisha Whittington MD  06/19/17  2:46 PM

## 2017-06-20 ENCOUNTER — TELEPHONE (OUTPATIENT)
Dept: FAMILY MEDICINE CLINIC | Facility: CLINIC | Age: 67
End: 2017-06-20

## 2017-06-20 NOTE — TELEPHONE ENCOUNTER
Pt called asking if their is any treatment for osteopenia?  She states she is trying to prevent it from becoming osteoporosis

## 2017-06-21 RX ORDER — ALENDRONATE SODIUM 35 MG/1
35 TABLET ORAL
Qty: 12 TABLET | Refills: 3 | Status: SHIPPED | OUTPATIENT
Start: 2017-06-21 | End: 2018-05-09 | Stop reason: SDUPTHER

## 2017-07-06 ENCOUNTER — APPOINTMENT (OUTPATIENT)
Dept: CARDIOLOGY | Facility: HOSPITAL | Age: 67
End: 2017-07-06
Attending: INTERNAL MEDICINE

## 2017-07-07 ENCOUNTER — HOSPITAL ENCOUNTER (OUTPATIENT)
Dept: CARDIOLOGY | Facility: HOSPITAL | Age: 67
Discharge: HOME | End: 2017-07-07
Attending: INTERNAL MEDICINE | Admitting: INTERNAL MEDICINE

## 2017-07-07 ENCOUNTER — TELEPHONE (OUTPATIENT)
Dept: CARDIOLOGY | Facility: CLINIC | Age: 67
End: 2017-07-07

## 2017-07-07 VITALS
HEART RATE: 73 BPM | HEIGHT: 63 IN | DIASTOLIC BLOOD PRESSURE: 62 MMHG | WEIGHT: 169 LBS | BODY MASS INDEX: 29.95 KG/M2 | SYSTOLIC BLOOD PRESSURE: 100 MMHG

## 2017-07-07 DIAGNOSIS — I27.82 OTHER CHRONIC PULMONARY EMBOLISM WITHOUT ACUTE COR PULMONALE (HCC): ICD-10-CM

## 2017-07-07 DIAGNOSIS — R53.82 CHRONIC FATIGUE: ICD-10-CM

## 2017-07-07 LAB
ASCENDING AORTA: 2.8 CM
BH CV ECHO MEAS - ACS: 1.7 CM
BH CV ECHO MEAS - AO MAX PG: 6.3 MMHG
BH CV ECHO MEAS - AO ROOT AREA (BSA CORRECTED): 1.4
BH CV ECHO MEAS - AO ROOT AREA: 5.3 CM^2
BH CV ECHO MEAS - AO ROOT DIAM: 2.6 CM
BH CV ECHO MEAS - AO V2 MAX: 126 CM/SEC
BH CV ECHO MEAS - BSA(HAYCOCK): 1.9 M^2
BH CV ECHO MEAS - BSA: 1.8 M^2
BH CV ECHO MEAS - BZI_BMI: 29.9 KILOGRAMS/M^2
BH CV ECHO MEAS - BZI_METRIC_HEIGHT: 160 CM
BH CV ECHO MEAS - BZI_METRIC_WEIGHT: 76.7 KG
BH CV ECHO MEAS - CONTRAST EF 4CH: 67.7 ML/M^2
BH CV ECHO MEAS - EDV(MOD-SP4): 62 ML
BH CV ECHO MEAS - EDV(TEICH): 103.6 ML
BH CV ECHO MEAS - EF(CUBED): 64 %
BH CV ECHO MEAS - EF(MOD-SP4): 59 %
BH CV ECHO MEAS - EF(TEICH): 55.5 %
BH CV ECHO MEAS - ESV(MOD-SP4): 20 ML
BH CV ECHO MEAS - ESV(TEICH): 46.1 ML
BH CV ECHO MEAS - FS: 28.9 %
BH CV ECHO MEAS - IVS/LVPW: 1
BH CV ECHO MEAS - IVSD: 0.9 CM
BH CV ECHO MEAS - LAT PEAK E' VEL: 9 CM/SEC
BH CV ECHO MEAS - LV DIASTOLIC VOL/BSA (35-75): 34.4 ML/M^2
BH CV ECHO MEAS - LV MASS(C)D: 140.1 GRAMS
BH CV ECHO MEAS - LV MASS(C)DI: 77.8 GRAMS/M^2
BH CV ECHO MEAS - LV SYSTOLIC VOL/BSA (12-30): 11.1 ML/M^2
BH CV ECHO MEAS - LVIDD: 4.7 CM
BH CV ECHO MEAS - LVIDS: 3.4 CM
BH CV ECHO MEAS - LVLD AP4: 6.7 CM
BH CV ECHO MEAS - LVLS AP4: 5.1 CM
BH CV ECHO MEAS - LVPWD: 0.87 CM
BH CV ECHO MEAS - MED PEAK E' VEL: 10 CM/SEC
BH CV ECHO MEAS - MR MAX PG: 90.7 MMHG
BH CV ECHO MEAS - MR MAX VEL: 476.1 CM/SEC
BH CV ECHO MEAS - MV A DUR: 0.11 SEC
BH CV ECHO MEAS - MV A MAX VEL: 85.4 CM/SEC
BH CV ECHO MEAS - MV DEC SLOPE: 486.9 CM/SEC^2
BH CV ECHO MEAS - MV DEC TIME: 0.2 SEC
BH CV ECHO MEAS - MV E MAX VEL: 100.9 CM/SEC
BH CV ECHO MEAS - MV E/A: 1.2
BH CV ECHO MEAS - MV P1/2T MAX VEL: 100.9 CM/SEC
BH CV ECHO MEAS - MV P1/2T: 60.7 MSEC
BH CV ECHO MEAS - MVA P1/2T LCG: 2.2 CM^2
BH CV ECHO MEAS - MVA(P1/2T): 3.6 CM^2
BH CV ECHO MEAS - PULM A REVS DUR: 0.11 SEC
BH CV ECHO MEAS - PULM A REVS VEL: 31.5 CM/SEC
BH CV ECHO MEAS - PULM DIAS VEL: 56.8 CM/SEC
BH CV ECHO MEAS - PULM S/D: 0.73
BH CV ECHO MEAS - PULM SYS VEL: 41.2 CM/SEC
BH CV ECHO MEAS - SI(CUBED): 37.5 ML/M^2
BH CV ECHO MEAS - SI(MOD-SP4): 23.3 ML/M^2
BH CV ECHO MEAS - SI(TEICH): 31.9 ML/M^2
BH CV ECHO MEAS - SV(CUBED): 67.5 ML
BH CV ECHO MEAS - SV(MOD-SP4): 42 ML
BH CV ECHO MEAS - SV(TEICH): 57.5 ML
BH CV ECHO MEAS - TAPSE (>1.6): 2.2 CM2
BH CV ECHO MEAS - TR MAX VEL: 241.1 CM/SEC
BH CV STRESS BP STAGE 1: NORMAL
BH CV STRESS BP STAGE 2: NORMAL
BH CV STRESS DURATION MIN STAGE 1: 3
BH CV STRESS DURATION MIN STAGE 2: 3
BH CV STRESS DURATION SEC STAGE 1: 0
BH CV STRESS DURATION SEC STAGE 2: 0
BH CV STRESS ECHO POST STRESS EJECTION FRACTION EF: 68 %
BH CV STRESS GRADE STAGE 1: 10
BH CV STRESS GRADE STAGE 2: 12
BH CV STRESS HR STAGE 1: 113
BH CV STRESS HR STAGE 2: 138
BH CV STRESS METS STAGE 1: 5
BH CV STRESS METS STAGE 2: 7.5
BH CV STRESS PROTOCOL 1: NORMAL
BH CV STRESS SPEED STAGE 1: 1.7
BH CV STRESS SPEED STAGE 2: 2.5
BH CV STRESS STAGE 1: 1
BH CV STRESS STAGE 2: 2
BH CV XLRA - RV BASE: 2.6 CM
BH CV XLRA - TDI S': 13 CM/SEC
E/E' RATIO: 9.5
LEFT ATRIUM VOLUME INDEX: 19 ML/M2
MAXIMAL PREDICTED HEART RATE: 156 BPM
PERCENT MAX PREDICTED HR: 88.46 %
SINUS: 2.6 CM
STJ: 2.3 CM
STRESS BASELINE BP: NORMAL MMHG
STRESS BASELINE HR: 73 BPM
STRESS PERCENT HR: 104 %
STRESS POST ESTIMATED WORKLOAD: 7 METS
STRESS POST EXERCISE DUR MIN: 6 MIN
STRESS POST EXERCISE DUR SEC: 0 SEC
STRESS POST PEAK BP: NORMAL MMHG
STRESS POST PEAK HR: 138 BPM
STRESS TARGET HR: 133 BPM

## 2017-07-07 PROCEDURE — 93018 CV STRESS TEST I&R ONLY: CPT | Performed by: INTERNAL MEDICINE

## 2017-07-07 PROCEDURE — 93325 DOPPLER ECHO COLOR FLOW MAPG: CPT

## 2017-07-07 PROCEDURE — 93350 STRESS TTE ONLY: CPT | Performed by: INTERNAL MEDICINE

## 2017-07-07 PROCEDURE — 93016 CV STRESS TEST SUPVJ ONLY: CPT | Performed by: INTERNAL MEDICINE

## 2017-07-07 PROCEDURE — 93325 DOPPLER ECHO COLOR FLOW MAPG: CPT | Performed by: INTERNAL MEDICINE

## 2017-07-07 PROCEDURE — 93017 CV STRESS TEST TRACING ONLY: CPT

## 2017-07-07 PROCEDURE — 93320 DOPPLER ECHO COMPLETE: CPT

## 2017-07-07 PROCEDURE — 93350 STRESS TTE ONLY: CPT

## 2017-07-07 PROCEDURE — 93320 DOPPLER ECHO COMPLETE: CPT | Performed by: INTERNAL MEDICINE

## 2017-07-07 NOTE — TELEPHONE ENCOUNTER
Left her a message that her stress echo was normal.  We could do a pulmonary workup but I would feel comfortable letting her exercise.  She has further problems call me.  Call if questions

## 2017-08-24 RX ORDER — METRONIDAZOLE 7.5 MG/G
GEL VAGINAL
Qty: 70 G | Refills: 1 | Status: SHIPPED | OUTPATIENT
Start: 2017-08-24 | End: 2018-02-16 | Stop reason: SDUPTHER

## 2018-02-19 RX ORDER — METRONIDAZOLE 7.5 MG/G
GEL VAGINAL
Qty: 70 G | Refills: 1 | Status: SHIPPED | OUTPATIENT
Start: 2018-02-19 | End: 2018-06-29 | Stop reason: ALTCHOICE

## 2018-03-23 ENCOUNTER — TRANSCRIBE ORDERS (OUTPATIENT)
Dept: ADMINISTRATIVE | Facility: HOSPITAL | Age: 68
End: 2018-03-23

## 2018-03-23 DIAGNOSIS — Z12.39 SCREENING BREAST EXAMINATION: Primary | ICD-10-CM

## 2018-04-20 ENCOUNTER — HOSPITAL ENCOUNTER (OUTPATIENT)
Dept: MAMMOGRAPHY | Facility: HOSPITAL | Age: 68
Discharge: HOME | End: 2018-04-20
Admitting: FAMILY MEDICINE

## 2018-04-20 DIAGNOSIS — Z12.39 SCREENING BREAST EXAMINATION: ICD-10-CM

## 2018-04-20 PROCEDURE — 77063 BREAST TOMOSYNTHESIS BI: CPT

## 2018-04-20 PROCEDURE — 77067 SCR MAMMO BI INCL CAD: CPT

## 2018-05-09 ENCOUNTER — OFFICE VISIT (OUTPATIENT)
Dept: OBSTETRICS AND GYNECOLOGY | Facility: CLINIC | Age: 68
End: 2018-05-09

## 2018-05-09 VITALS
HEIGHT: 63 IN | BODY MASS INDEX: 30.3 KG/M2 | DIASTOLIC BLOOD PRESSURE: 78 MMHG | SYSTOLIC BLOOD PRESSURE: 122 MMHG | WEIGHT: 171 LBS

## 2018-05-09 DIAGNOSIS — R31.29 MICROSCOPIC HEMATURIA: ICD-10-CM

## 2018-05-09 DIAGNOSIS — Z01.411 ENCOUNTER FOR GYNECOLOGICAL EXAMINATION WITH ABNORMAL FINDING: ICD-10-CM

## 2018-05-09 DIAGNOSIS — Z13.9 SCREENING FOR CONDITION: Primary | ICD-10-CM

## 2018-05-09 DIAGNOSIS — R63.5 WEIGHT GAIN: ICD-10-CM

## 2018-05-09 DIAGNOSIS — Z80.3 FAMILY HISTORY OF BREAST CANCER: ICD-10-CM

## 2018-05-09 DIAGNOSIS — N89.8 VAGINAL DISCHARGE: ICD-10-CM

## 2018-05-09 DIAGNOSIS — R53.83 FATIGUE, UNSPECIFIED TYPE: ICD-10-CM

## 2018-05-09 LAB
BILIRUB BLD-MCNC: NEGATIVE MG/DL
CLARITY, POC: CLEAR
COLOR UR: YELLOW
GLUCOSE UR STRIP-MCNC: NEGATIVE MG/DL
KETONES UR QL: NEGATIVE
LEUKOCYTE EST, POC: NEGATIVE
NITRITE UR-MCNC: NEGATIVE MG/ML
PH UR: 5 [PH] (ref 5–8)
PROT UR STRIP-MCNC: NEGATIVE MG/DL
RBC # UR STRIP: ABNORMAL /UL
SP GR UR: 1 (ref 1–1.03)
UROBILINOGEN UR QL: NORMAL

## 2018-05-09 PROCEDURE — 99213 OFFICE O/P EST LOW 20 MIN: CPT | Performed by: OBSTETRICS & GYNECOLOGY

## 2018-05-09 PROCEDURE — 99397 PER PM REEVAL EST PAT 65+ YR: CPT | Performed by: OBSTETRICS & GYNECOLOGY

## 2018-05-09 PROCEDURE — 81002 URINALYSIS NONAUTO W/O SCOPE: CPT | Performed by: OBSTETRICS & GYNECOLOGY

## 2018-05-09 NOTE — PROGRESS NOTES
GYN Annual Exam     CC- Here for annual exam.     Suly Ware is a 67 y.o. female established patient who presents for annual well woman exam. No  VB. She has noticed some vaginal discharge. She has had fatigue and weight gain. She did have BRCA testing that was negative.She does not want TC calculated or additional imaging or meds to reduce risk. She has blood in her urine today but has never seen any gross blood.       OB History      Para Term  AB Living    3 2 2  1     SAB TAB Ectopic Molar Multiple Live Births    1             Obstetric Comments    2 C/S  1 SAB with D&C          Menarche:16  Menopause:55  HRT:none  Current contraception: post menopausal status and tubal ligation  History of abnormal Pap smear: no  History of abnormal mammogram: no  Family history of uterine, colon or ovarian cancer: no  Family history of breast cancer: yes - P aunt breast cancer age 39, pt is BRCA neg  STD's: none    Health Maintenance   Topic Date Due   • TDAP/TD VACCINES (1 - Tdap) 1971   • HEPATITIS C SCREENING  2016   • MEDICARE ANNUAL WELLNESS  2016   • COLONOSCOPY  2016   • PNEUMOCOCCAL VACCINES (65+ LOW/MEDIUM RISK) (1 of 2 - PCV13) 2017   • INFLUENZA VACCINE  2018   • PAP SMEAR  02/15/2020   • MAMMOGRAM  2020   • ZOSTER VACCINE  Completed       Past Medical History:   Diagnosis Date   • Acute serous otitis media    • Acute sinusitis    • Arthralgia    • Depression    • DVT of lower extremity (deep venous thrombosis)    • Migraine headache    • PFO (patent foramen ovale)    • Profound fatigue     when walking upstairs   • Pulmonary embolus 2000    from OCPs, thrombophilia w/u neg   • Shoulder pain        Past Surgical History:   Procedure Laterality Date   • APPENDECTOMY     •  SECTION      x 2   • COLONOSCOPY     • DILATATION AND CURETTAGE     • EXPLORATORY LAPAROTOMY      dermoid removed   • HX OVARIAN CYSTECTOMY     • TUBAL ABDOMINAL LIGATION    "        Current Outpatient Prescriptions:   •  metroNIDAZOLE (METROGEL) 0.75 % vaginal gel, INSERT 1 APPLICATOR(S)FUL EVERY DAY BY VAGINAL ROUTE AT BEDTIME FOR 7 DAYS., Disp: 70 g, Rfl: 1    Allergies   Allergen Reactions   • Sulfites        Social History   Substance Use Topics   • Smoking status: Former Smoker     Start date: 1978     Quit date: 1985   • Smokeless tobacco: Never Used   • Alcohol use Yes      Comment: social drinker       Family History   Problem Relation Age of Onset   • Stroke Mother    • Hyperlipidemia Mother    • Heart disease Mother    • Heart attack Mother    • Diabetes Mother    • Osteoporosis Mother    • Stroke Father    • Aortic aneurysm Father    • Deep vein thrombosis Father    • Stroke Brother    • Hyperlipidemia Brother    • Heart disease Brother    • Breast cancer Maternal Aunt    • No Known Problems Daughter    • No Known Problems Daughter    • Ovarian cancer Neg Hx    • Colon cancer Neg Hx        Review of Systems   Constitutional: Positive for fatigue and unexpected weight change (gain). Negative for appetite change and fever.   Respiratory: Negative for cough and shortness of breath.    Cardiovascular: Negative for chest pain and palpitations.   Gastrointestinal: Negative for abdominal distention, abdominal pain, constipation, diarrhea and nausea.   Endocrine: Negative for cold intolerance and heat intolerance.   Genitourinary: Positive for vaginal discharge. Negative for dyspareunia, dysuria, flank pain, genital sores, hematuria, menstrual problem and pelvic pain.   Skin: Negative for color change and rash.   Neurological: Negative for headaches.   Psychiatric/Behavioral: Negative for dysphoric mood. The patient is not nervous/anxious.        /78   Ht 160 cm (63\")   Wt 77.6 kg (171 lb)   BMI 30.29 kg/m²     Physical Exam   Constitutional: She is oriented to person, place, and time. She appears well-developed and well-nourished.   HENT:   Head: Normocephalic and " atraumatic.   Neck: No thyromegaly present.   Cardiovascular: Normal rate, regular rhythm and normal heart sounds.    Pulmonary/Chest: Effort normal and breath sounds normal. Right breast exhibits no inverted nipple, no mass, no nipple discharge, no skin change and no tenderness. Left breast exhibits no inverted nipple, no mass, no nipple discharge, no skin change and no tenderness.   Abdominal: Soft. Bowel sounds are normal. She exhibits no distension and no mass. There is no tenderness. There is no rebound and no guarding. No hernia.   Genitourinary: Pelvic exam was performed with patient supine. There is no rash, tenderness or lesion on the right labia. There is no rash, tenderness or lesion on the left labia. No vaginal discharge found.   Neurological: She is alert and oriented to person, place, and time.   Skin: Skin is warm and dry.   Psychiatric: She has a normal mood and affect. Her behavior is normal. Judgment and thought content normal.   Nursing note and vitals reviewed.         Assessment/Plan    1) GYN HM: normal pap/HPV 2/2017    SBE demonstrated and encouraged.  2) STD screening: declines Condoms encouraged.  3) Bone health - Weight bearing exercise, dietary calcium recommendations and vitamin D reviewed.   4) Diet and Exercise discussed  5) Smoking Status: non smoker  6) Vaginal discharge- check NuSwab Y/M.   7)MMG:  UTD 4/2018 Birads 1  8) DEXA- osteopenia. Was given Fosamax by her primary but did not take it. Calculate FRAX and treat if high risk.   9)C scope- UTD, repeat 2019  10) Hematuria on dip- check UA and CS.   11) Family history of breast cancer- reviewed BRCA results with pt. TC not calculated as she does not want additional imaging or medication for risk reduction.    Follow up prn and 1 year       Suly was seen today for gynecologic exam.    Diagnoses and all orders for this visit:    Screening for condition  -     POC Urinalysis Dipstick    Vaginal discharge  -     Bacterial  Vaginosis, JUSTA - Swab, Vagina  -     Genital Mycoplasmas JUSTA, Swab - Swab, Vagina    Weight gain  -     TSH  -     T3  -     T4, Free  -     Thyroid Peroxidase Antibody    Fatigue, unspecified type  -     TSH  -     T3  -     T4, Free  -     Thyroid Peroxidase Antibody    Microscopic hematuria  -     Urine Culture - Urine, Urine, Clean Catch    Encounter for gynecological examination with abnormal finding    Family history of breast cancer- paternal aunt age 39, pt is BRCA neg.        Rosa Beltre MD  5/9/18  4:15 PM

## 2018-05-10 LAB
T3 SERPL-MCNC: 90.2 NG/DL (ref 80–200)
T4 FREE SERPL-MCNC: 1.17 NG/DL (ref 0.93–1.7)
THYROPEROXIDASE AB SERPL-ACNC: 9 IU/ML (ref 0–34)
TSH SERPL DL<=0.005 MIU/L-ACNC: 1.96 MIU/ML (ref 0.27–4.2)

## 2018-05-11 LAB
BACTERIA UR CULT: NORMAL
BACTERIA UR CULT: NORMAL

## 2018-05-13 PROBLEM — Z80.3 FAMILY HISTORY OF BREAST CANCER: Status: ACTIVE | Noted: 2018-05-13

## 2018-05-13 PROBLEM — Z00.00 ANNUAL PHYSICAL EXAM: Status: RESOLVED | Noted: 2017-05-24 | Resolved: 2018-05-13

## 2018-05-13 NOTE — PROGRESS NOTES
PIP= normal thyroid studies. Urine culture shows no infection. Pt has blood in her urine. Rec she RTO to leave a urine sample in 1-2 weeks and if she still has blood in her urine then she will need to be referred to a urologist. Thanks

## 2018-05-15 LAB
A VAGINAE DNA VAG QL NAA+PROBE: NORMAL SCORE
BVAB2 DNA VAG QL NAA+PROBE: NORMAL SCORE
LABORATORY COMMENT REPORT: ABNORMAL
M GENITALIUM DNA SPEC QL NAA+PROBE: NEGATIVE
M HOMINIS DNA SPEC QL NAA+PROBE: NEGATIVE
MEGA1 DNA VAG QL NAA+PROBE: NORMAL SCORE
UREAPLASMA DNA SPEC QL NAA+PROBE: POSITIVE

## 2018-05-15 RX ORDER — AZITHROMYCIN 500 MG/1
1000 TABLET, FILM COATED ORAL ONCE
Qty: 1 TABLET | Refills: 1 | Status: SHIPPED | OUTPATIENT
Start: 2018-05-15 | End: 2018-05-15

## 2018-06-13 ENCOUNTER — TELEPHONE (OUTPATIENT)
Dept: OBSTETRICS AND GYNECOLOGY | Facility: CLINIC | Age: 68
End: 2018-06-13

## 2018-06-13 NOTE — TELEPHONE ENCOUNTER
Patient has infection back again that you have treated before in the past. Can you prescribe something different? You gave her Metrogel last visit.  Complaining of discharge and odor

## 2018-06-29 RX ORDER — METRONIDAZOLE 500 MG/1
500 TABLET ORAL 2 TIMES DAILY
Qty: 14 TABLET | Refills: 0 | Status: SHIPPED | OUTPATIENT
Start: 2018-06-29 | End: 2018-07-06

## 2018-08-21 RX ORDER — METRONIDAZOLE 7.5 MG/G
GEL VAGINAL
Qty: 70 G | Refills: 1 | Status: SHIPPED | OUTPATIENT
Start: 2018-08-21 | End: 2019-01-03 | Stop reason: SDUPTHER

## 2018-08-30 ENCOUNTER — OFFICE VISIT (OUTPATIENT)
Dept: FAMILY MEDICINE CLINIC | Facility: CLINIC | Age: 68
End: 2018-08-30

## 2018-08-30 VITALS
BODY MASS INDEX: 30.67 KG/M2 | RESPIRATION RATE: 18 BRPM | OXYGEN SATURATION: 100 % | TEMPERATURE: 98.3 F | WEIGHT: 173.1 LBS | DIASTOLIC BLOOD PRESSURE: 80 MMHG | HEIGHT: 63 IN | HEART RATE: 67 BPM | SYSTOLIC BLOOD PRESSURE: 120 MMHG

## 2018-08-30 DIAGNOSIS — R35.0 FREQUENCY OF URINATION: ICD-10-CM

## 2018-08-30 DIAGNOSIS — N30.01 ACUTE CYSTITIS WITH HEMATURIA: Primary | ICD-10-CM

## 2018-08-30 DIAGNOSIS — R31.29 MICROHEMATURIA: ICD-10-CM

## 2018-08-30 PROCEDURE — 99213 OFFICE O/P EST LOW 20 MIN: CPT | Performed by: FAMILY MEDICINE

## 2018-08-30 PROCEDURE — 81003 URINALYSIS AUTO W/O SCOPE: CPT | Performed by: FAMILY MEDICINE

## 2018-08-30 RX ORDER — SULFAMETHOXAZOLE AND TRIMETHOPRIM 800; 160 MG/1; MG/1
TABLET ORAL
Qty: 14 TABLET | Refills: 0 | Status: SHIPPED | OUTPATIENT
Start: 2018-08-30 | End: 2018-09-04

## 2018-08-30 RX ORDER — PHENAZOPYRIDINE HYDROCHLORIDE 200 MG/1
TABLET, FILM COATED ORAL
Qty: 9 TABLET | Refills: 0 | Status: SHIPPED | OUTPATIENT
Start: 2018-08-30 | End: 2019-08-20

## 2018-09-04 ENCOUNTER — TELEPHONE (OUTPATIENT)
Dept: FAMILY MEDICINE CLINIC | Facility: CLINIC | Age: 68
End: 2018-09-04

## 2018-09-04 RX ORDER — NITROFURANTOIN 25; 75 MG/1; MG/1
CAPSULE ORAL
Qty: 14 CAPSULE | Refills: 0 | Status: SHIPPED | OUTPATIENT
Start: 2018-09-04 | End: 2019-08-20

## 2018-09-04 NOTE — TELEPHONE ENCOUNTER
Pt was in Thursday for a uti. Pt is not feeling better. You gave pt sulfamethoxazole-trimethoprim and she was wondering if there was something else you could give her or if she needs to be seen again? She has to get on a plane tomorrow morning to [place] and will be gone till Saturday.

## 2018-09-05 LAB
BACTERIA UR CULT: ABNORMAL
BACTERIA UR CULT: ABNORMAL
OTHER ANTIBIOTIC SUSC ISLT: ABNORMAL

## 2018-09-21 ENCOUNTER — CLINICAL SUPPORT (OUTPATIENT)
Dept: FAMILY MEDICINE CLINIC | Facility: CLINIC | Age: 68
End: 2018-09-21

## 2018-09-21 DIAGNOSIS — N30.01 ACUTE CYSTITIS WITH HEMATURIA: Primary | ICD-10-CM

## 2018-09-21 DIAGNOSIS — N30.00 ACUTE CYSTITIS WITHOUT HEMATURIA: Primary | ICD-10-CM

## 2018-09-21 LAB
BILIRUB BLD-MCNC: NEGATIVE MG/DL
CLARITY, POC: CLEAR
COLOR UR: YELLOW
GLUCOSE UR STRIP-MCNC: NEGATIVE MG/DL
KETONES UR QL: NEGATIVE
LEUKOCYTE EST, POC: NEGATIVE
NITRITE UR-MCNC: NEGATIVE MG/ML
PH UR: 5.5 [PH] (ref ?–8)
PROT UR STRIP-MCNC: NEGATIVE MG/DL
RBC # UR STRIP: ABNORMAL /UL
SP GR UR: 1 (ref 1–1.03)
UROBILINOGEN UR QL: NORMAL

## 2018-09-21 PROCEDURE — 81003 URINALYSIS AUTO W/O SCOPE: CPT | Performed by: FAMILY MEDICINE

## 2018-09-23 LAB
BACTERIA UR CULT: NORMAL
BACTERIA UR CULT: NORMAL

## 2018-09-24 DIAGNOSIS — R31.29 MICROHEMATURIA: Primary | ICD-10-CM

## 2018-09-24 DIAGNOSIS — Z87.440 HISTORY OF UTI: ICD-10-CM

## 2018-10-15 ENCOUNTER — TELEPHONE (OUTPATIENT)
Dept: FAMILY MEDICINE CLINIC | Facility: CLINIC | Age: 68
End: 2018-10-15

## 2018-10-15 NOTE — TELEPHONE ENCOUNTER
DR ARENAS PUT AN ORDER FOR URINALYSIS ON 9/24 DOES THE PT STILL NEED THIS?  THANK YOU   THIS IS OVERDUE TASKS

## 2018-10-15 NOTE — TELEPHONE ENCOUNTER
Yes she was suppose to come in 5 days after she was done with antibiotic and do a repeat culture. But that should of been done in September so I don't know if she still needs this or not?

## 2018-10-24 ENCOUNTER — TELEPHONE (OUTPATIENT)
Dept: FAMILY MEDICINE CLINIC | Facility: CLINIC | Age: 68
End: 2018-10-24

## 2018-10-24 NOTE — TELEPHONE ENCOUNTER
Did you ever find out if this pt is going to come and get the UA rechecked or can we cancel this?  Thank you   This is in overdue tasks

## 2018-10-31 ENCOUNTER — CLINICAL SUPPORT (OUTPATIENT)
Dept: FAMILY MEDICINE CLINIC | Facility: CLINIC | Age: 68
End: 2018-10-31

## 2018-10-31 DIAGNOSIS — R31.29 MICROHEMATURIA: ICD-10-CM

## 2018-10-31 DIAGNOSIS — Z87.440 HISTORY OF UTI: ICD-10-CM

## 2018-10-31 DIAGNOSIS — R35.0 FREQUENCY OF URINATION: Primary | ICD-10-CM

## 2018-11-02 LAB
BACTERIA UR CULT: NO GROWTH
BACTERIA UR CULT: NORMAL

## 2019-01-03 RX ORDER — METRONIDAZOLE 7.5 MG/G
GEL VAGINAL
Qty: 70 G | Refills: 1 | Status: SHIPPED | OUTPATIENT
Start: 2019-01-03 | End: 2020-01-28

## 2019-06-19 ENCOUNTER — TRANSCRIBE ORDERS (OUTPATIENT)
Dept: ADMINISTRATIVE | Facility: HOSPITAL | Age: 69
End: 2019-06-19

## 2019-06-19 DIAGNOSIS — Z12.31 VISIT FOR SCREENING MAMMOGRAM: Primary | ICD-10-CM

## 2019-06-27 ENCOUNTER — APPOINTMENT (OUTPATIENT)
Dept: MAMMOGRAPHY | Facility: HOSPITAL | Age: 69
End: 2019-06-27

## 2019-07-22 ENCOUNTER — HOSPITAL ENCOUNTER (OUTPATIENT)
Dept: MAMMOGRAPHY | Facility: HOSPITAL | Age: 69
Discharge: HOME | End: 2019-07-22
Admitting: OBSTETRICS & GYNECOLOGY

## 2019-07-22 DIAGNOSIS — Z12.31 VISIT FOR SCREENING MAMMOGRAM: ICD-10-CM

## 2019-07-22 PROCEDURE — 77067 SCR MAMMO BI INCL CAD: CPT

## 2019-07-22 PROCEDURE — 77063 BREAST TOMOSYNTHESIS BI: CPT

## 2019-08-05 ENCOUNTER — OFFICE VISIT (OUTPATIENT)
Dept: FAMILY MEDICINE CLINIC | Facility: CLINIC | Age: 69
End: 2019-08-05

## 2019-08-05 VITALS
TEMPERATURE: 97.8 F | HEIGHT: 63 IN | HEART RATE: 66 BPM | BODY MASS INDEX: 31.36 KG/M2 | DIASTOLIC BLOOD PRESSURE: 82 MMHG | SYSTOLIC BLOOD PRESSURE: 122 MMHG | WEIGHT: 177 LBS | OXYGEN SATURATION: 98 %

## 2019-08-05 DIAGNOSIS — Z13.220 LIPID SCREENING: ICD-10-CM

## 2019-08-05 DIAGNOSIS — R53.83 FATIGUE, UNSPECIFIED TYPE: ICD-10-CM

## 2019-08-05 DIAGNOSIS — Z00.00 MEDICARE ANNUAL WELLNESS VISIT, SUBSEQUENT: Primary | ICD-10-CM

## 2019-08-05 DIAGNOSIS — Z78.0 POST-MENOPAUSAL: ICD-10-CM

## 2019-08-05 DIAGNOSIS — F32.A DEPRESSION, UNSPECIFIED DEPRESSION TYPE: ICD-10-CM

## 2019-08-05 PROCEDURE — 90715 TDAP VACCINE 7 YRS/> IM: CPT | Performed by: FAMILY MEDICINE

## 2019-08-05 PROCEDURE — 99213 OFFICE O/P EST LOW 20 MIN: CPT | Performed by: FAMILY MEDICINE

## 2019-08-05 PROCEDURE — 90471 IMMUNIZATION ADMIN: CPT | Performed by: FAMILY MEDICINE

## 2019-08-05 PROCEDURE — G0439 PPPS, SUBSEQ VISIT: HCPCS | Performed by: FAMILY MEDICINE

## 2019-08-05 RX ORDER — ESCITALOPRAM OXALATE 10 MG/1
10 TABLET ORAL DAILY
Qty: 90 TABLET | Refills: 0 | Status: SHIPPED | OUTPATIENT
Start: 2019-08-05 | End: 2019-10-24

## 2019-08-05 NOTE — PROGRESS NOTES
The ABCs of the Annual Wellness Visit  Subsequent Medicare Wellness Visit    Chief Complaint   Patient presents with   • Medicare Wellness-subsequent   • Depression     crying in office , emotional, cry easy    • Foot Injury     Right food , redness and sore due to injury from walking x3 weeks        Subjective   History of Present Illness:  Suly Ware is a 68 y.o. female who presents for a Subsequent Medicare Wellness Visit.  Patient is also complaining of increased fatigue, said mood and easy crying.  She has had a history of depression in the past.  He was on Lexapro in the past and quit taking few years ago.  She denies any SI or HI.  Patient also complaining of increased fatigue.  He works full-time and also travels for work.    HEALTH RISK ASSESSMENT    Recent Hospitalizations:  No hospitalization(s) within the last year.    Current Medical Providers:  Patient Care Team:  Roberta Vazquez MD as PCP - General (Family Medicine)  Claus Monae MD as PCP - Claims Attributed    Smoking Status:  Social History     Tobacco Use   Smoking Status Former Smoker   • Start date:    • Last attempt to quit:    • Years since quittin.6   Smokeless Tobacco Never Used       Alcohol Consumption:  Social History     Substance and Sexual Activity   Alcohol Use Yes   • Alcohol/week: 1.2 oz   • Types: 1 Cans of beer, 1 Shots of liquor per week    Comment: social drinker       Depression Screen:   PHQ-2/PHQ-9 Depression Screening 2019   Little interest or pleasure in doing things 1   Feeling down, depressed, or hopeless 1   Trouble falling or staying asleep, or sleeping too much 1   Feeling tired or having little energy 1   Poor appetite or overeating 1   Feeling bad about yourself - or that you are a failure or have let yourself or your family down 1   Trouble concentrating on things, such as reading the newspaper or watching television 1   Moving or speaking so slowly that other people could have noticed.  Or the opposite - being so fidgety or restless that you have been moving around a lot more than usual 0   Thoughts that you would be better off dead, or of hurting yourself in some way 0   Total Score 7       Fall Risk Screen:  LESLYE Fall Risk Assessment has not been completed.    Health Habits and Functional and Cognitive Screening:  Functional & Cognitive Status 8/5/2019   Do you have difficulty preparing food and eating? No   Do you have difficulty bathing yourself, getting dressed or grooming yourself? No   Do you have difficulty using the toilet? No   Do you have difficulty moving around from place to place? No   Do you have trouble with steps or getting out of a bed or a chair? No   Current Diet Limited Junk Food   Dental Exam Not up to date   Eye Exam Unknown   Exercise (times per week) 2 times per week   Current Exercise Activities Include Walking   Do you need help using the phone?  No   Are you deaf or do you have serious difficulty hearing?  No   Do you need help with transportation? No   Do you need help shopping? No   Do you need help preparing meals?  No   Do you need help with housework?  No   Do you need help with laundry? No   Do you need help taking your medications? No   Do you need help managing money? No   Do you ever drive or ride in a car without wearing a seat belt? No   Have you felt unusual stress, anger or loneliness in the last month? Yes   Who do you live with? Spouse   If you need help, do you have trouble finding someone available to you? No   Have you been bothered in the last four weeks by sexual problems? No   Do you have difficulty concentrating, remembering or making decisions? Yes         Does the patient have evidence of cognitive impairment? No    Asprin use counseling:Start ASA 81 mg daily     Age-appropriate Screening Schedule:  Refer to the list below for future screening recommendations based on patient's age, sex and/or medical conditions. Orders for these recommended  tests are listed in the plan section. The patient has been provided with a written plan.    Health Maintenance   Topic Date Due   • TDAP/TD VACCINES (1 - Tdap) 09/19/1971   • ZOSTER VACCINE (2 of 3) 01/10/2014   • COLONOSCOPY  02/22/2016   • PNEUMOCOCCAL VACCINES (65+ LOW/MEDIUM RISK) (1 of 2 - PCV13) 09/19/2017   • INFLUENZA VACCINE  08/01/2019   • MAMMOGRAM  07/22/2021          The following portions of the patient's history were reviewed and updated as appropriate: allergies, current medications, past family history, past medical history, past social history, past surgical history and problem list.    Outpatient Medications Prior to Visit   Medication Sig Dispense Refill   • metroNIDAZOLE (METROGEL) 0.75 % vaginal gel INSERT 1 APPLICATOR(S)FUL EVERY DAY BY VAGINAL ROUTE AT BEDTIME FOR 7 DAYS. 70 g 1   • nitrofurantoin, macrocrystal-monohydrate, (MACROBID) 100 MG capsule One cap po BID until completed for infection 14 capsule 0   • phenazopyridine (PYRIDIUM) 200 MG tablet One tablet PO TID for urinary discomfort 9 tablet 0     No facility-administered medications prior to visit.        Patient Active Problem List   Diagnosis   • Headache, migraine   • DVT (deep venous thrombosis) (CMS/HCC)   • Chronic right shoulder pain   • Influenza   • Chronic fatigue   • Pulmonary embolus (CMS/HCC)   • Family history of breast cancer- paternal aunt age 39, pt is BRCA neg.       Advanced Care Planning:  Patient does not have an advance directive - information provided to the patient today    Review of Systems   Constitutional: Negative for activity change, appetite change, chills, fatigue and fever.   HENT: Negative for congestion, ear discharge, ear pain, hearing loss, mouth sores and sore throat.    Eyes: Negative for pain, discharge and redness.   Respiratory: Negative for cough, chest tightness, shortness of breath and wheezing.    Cardiovascular: Negative for chest pain, palpitations and leg swelling.   Gastrointestinal:  "Negative for abdominal distention, abdominal pain, constipation, diarrhea, nausea and vomiting.   Endocrine: Negative for cold intolerance, polydipsia, polyphagia and polyuria.   Genitourinary: Negative for difficulty urinating, dysuria, flank pain, hematuria and urgency.   Musculoskeletal: Negative for arthralgias, back pain, joint swelling, neck pain and neck stiffness.   Skin: Negative for color change and pallor.   Allergic/Immunologic: Negative for environmental allergies and food allergies.   Neurological: Negative for dizziness, weakness, light-headedness and headaches.   Psychiatric/Behavioral: Positive for dysphoric mood. Negative for behavioral problems, decreased concentration, sleep disturbance and suicidal ideas. The patient is not nervous/anxious.        Compared to one year ago, the patient feels her physical health is the same.  Compared to one year ago, the patient feels her mental health is worse.    Reviewed chart for potential of high risk medication in the elderly: yes  Reviewed chart for potential of harmful drug interactions in the elderly:no    Objective         Vitals:    08/05/19 0837   BP: 122/82   Pulse: 66   Temp: 97.8 °F (36.6 °C)   SpO2: 98%   Weight: 80.3 kg (177 lb)   Height: 160 cm (63\")       Body mass index is 31.35 kg/m².  Discussed the patient's BMI with her. The BMI is above average; BMI management plan is completed.    Physical Exam   Constitutional: She is oriented to person, place, and time. She appears well-developed and well-nourished.   HENT:   Head: Normocephalic and atraumatic.   Mouth/Throat: Oropharynx is clear and moist.   Eyes: Conjunctivae are normal. Pupils are equal, round, and reactive to light.   Neck: Normal range of motion. Neck supple. No thyromegaly present.   Cardiovascular: Normal rate, regular rhythm, normal heart sounds and intact distal pulses.   Pulmonary/Chest: Effort normal and breath sounds normal. No respiratory distress. She has no wheezes. She " has no rales.   Abdominal: Soft. Bowel sounds are normal. She exhibits no distension and no mass. There is no tenderness. No hernia.   Musculoskeletal: She exhibits no edema.   Neurological: She is alert and oriented to person, place, and time. She displays normal reflexes. No cranial nerve deficit or sensory deficit.   Psychiatric: She has a normal mood and affect. Her behavior is normal.   Nursing note and vitals reviewed.    The 10-year ASCVD risk score (Emely RIOS Jr., et al., 2013) is: 5.3%    Values used to calculate the score:      Age: 68 years      Sex: Female      Is Non- : No      Diabetic: No      Tobacco smoker: No      Systolic Blood Pressure: 122 mmHg      Is BP treated: No      HDL Cholesterol: 69 mg/dL      Total Cholesterol: 207 mg/dL        Assessment/Plan   Medicare Risks and Personalized Health Plan  CMS Preventative Services Quick Reference  Advance Directive Discussion  Immunizations Discussed/Encouraged (specific immunizations; adacel Tdap )     The above risks/problems have been discussed with the patient.  Pertinent information has been shared with the patient in the After Visit Summary.  Follow up plans and orders are seen below in the Assessment/Plan Section.    Diagnoses and all orders for this visit:    1. Medicare annual wellness visit, subsequent (Primary)  -     Comprehensive Metabolic Panel  -     TSH+Free T4  -     Lipid Panel  -     Urinalysis With Culture If Indicated -  -     Ambulatory Referral For Screening Colonoscopy    2. Fatigue, unspecified type  -     CBC & Differential  -     TSH+Free T4  -     Vitamin B12 & Folate    3. Depression, unspecified depression type    4. Post-menopausal  -     DEXA Bone Density Axial; Future    5. Lipid screening    Other orders  -     escitalopram (LEXAPRO) 10 MG tablet; Take 1 tablet by mouth Daily.  Dispense: 90 tablet; Refill: 0  -     pneumococcal conj. 13-valent (PREVNAR-13) vaccine 0.5 mL  -     Tdap Vaccine  Greater Than or Equal To 8yo IM          Ms. Suly Ware is a 68-year-old female patienT seen today for Medicare wellness visit and new onset fatigue and depression.  Preventive with screening and healthy lifestyle discussed with patient.  Patient will need Tdap and Prevnar 13 today.  Also we will do the fasting labs today.  Also complaining of increased fatigue and depression.  Differential  diagnosis of  fatigue discussed patient.  I will check  TSH, CBC and B12 and folic acid.  Also advised to increase calcium intake.  We will check bone density.  She is up-to-date with mammogram.  Colorectal screening discussed with patient.  She will need referral for colonoscopy.  For depression, will start her on Lexapro 10 mg.  Effects of Lexapro discussed with patient.  Follow-up in 3 months for depression            Follow Up:  Return in about 3 months (around 11/5/2019), or if symptoms worsen or fail to improve, for Recheck.     An After Visit Summary and PPPS were given to the patient.

## 2019-08-06 LAB
ALBUMIN SERPL-MCNC: 4 G/DL (ref 3.5–5.2)
ALBUMIN/GLOB SERPL: 1.7 G/DL
ALP SERPL-CCNC: 67 U/L (ref 39–117)
ALT SERPL-CCNC: 12 U/L (ref 1–33)
APPEARANCE UR: CLEAR
AST SERPL-CCNC: 16 U/L (ref 1–32)
BACTERIA #/AREA URNS HPF: NORMAL /HPF
BASOPHILS # BLD AUTO: 0.06 10*3/MM3 (ref 0–0.2)
BASOPHILS NFR BLD AUTO: 0.9 % (ref 0–1.5)
BILIRUB SERPL-MCNC: 0.2 MG/DL (ref 0.2–1.2)
BILIRUB UR QL STRIP: NEGATIVE
BUN SERPL-MCNC: 13 MG/DL (ref 8–23)
BUN/CREAT SERPL: 13.5 (ref 7–25)
CALCIUM SERPL-MCNC: 9 MG/DL (ref 8.6–10.5)
CHLORIDE SERPL-SCNC: 105 MMOL/L (ref 98–107)
CHOLEST SERPL-MCNC: 207 MG/DL (ref 0–200)
CO2 SERPL-SCNC: 25.5 MMOL/L (ref 22–29)
COLOR UR: YELLOW
CREAT SERPL-MCNC: 0.96 MG/DL (ref 0.57–1)
EOSINOPHIL # BLD AUTO: 0.12 10*3/MM3 (ref 0–0.4)
EOSINOPHIL NFR BLD AUTO: 1.9 % (ref 0.3–6.2)
EPI CELLS #/AREA URNS HPF: NORMAL /HPF
ERYTHROCYTE [DISTWIDTH] IN BLOOD BY AUTOMATED COUNT: 13.3 % (ref 12.3–15.4)
FOLATE SERPL-MCNC: 19.5 NG/ML (ref 4.78–24.2)
GLOBULIN SER CALC-MCNC: 2.4 GM/DL
GLUCOSE SERPL-MCNC: 107 MG/DL (ref 65–99)
GLUCOSE UR QL: NEGATIVE
HCT VFR BLD AUTO: 42.4 % (ref 34–46.6)
HDLC SERPL-MCNC: 69 MG/DL (ref 40–60)
HGB BLD-MCNC: 12.6 G/DL (ref 12–15.9)
HGB UR QL STRIP: ABNORMAL
IMM GRANULOCYTES # BLD AUTO: 0.02 10*3/MM3 (ref 0–0.05)
IMM GRANULOCYTES NFR BLD AUTO: 0.3 % (ref 0–0.5)
KETONES UR QL STRIP: NEGATIVE
LDLC SERPL CALC-MCNC: 124 MG/DL (ref 0–100)
LEUKOCYTE ESTERASE UR QL STRIP: NEGATIVE
LYMPHOCYTES # BLD AUTO: 1.97 10*3/MM3 (ref 0.7–3.1)
LYMPHOCYTES NFR BLD AUTO: 30.4 % (ref 19.6–45.3)
MCH RBC QN AUTO: 29.6 PG (ref 26.6–33)
MCHC RBC AUTO-ENTMCNC: 29.7 G/DL (ref 31.5–35.7)
MCV RBC AUTO: 99.8 FL (ref 79–97)
MICRO URNS: ABNORMAL
MONOCYTES # BLD AUTO: 0.34 10*3/MM3 (ref 0.1–0.9)
MONOCYTES NFR BLD AUTO: 5.2 % (ref 5–12)
NEUTROPHILS # BLD AUTO: 3.97 10*3/MM3 (ref 1.7–7)
NEUTROPHILS NFR BLD AUTO: 61.3 % (ref 42.7–76)
NITRITE UR QL STRIP: NEGATIVE
NRBC BLD AUTO-RTO: 0 /100 WBC (ref 0–0.2)
PH UR STRIP: 6.5 [PH] (ref 5–7.5)
PLATELET # BLD AUTO: 298 10*3/MM3 (ref 140–450)
POTASSIUM SERPL-SCNC: 4.3 MMOL/L (ref 3.5–5.2)
PROT SERPL-MCNC: 6.4 G/DL (ref 6–8.5)
PROT UR QL STRIP: NEGATIVE
RBC # BLD AUTO: 4.25 10*6/MM3 (ref 3.77–5.28)
RBC #/AREA URNS HPF: NORMAL /HPF
SODIUM SERPL-SCNC: 140 MMOL/L (ref 136–145)
SP GR UR: 1.01 (ref 1–1.03)
T4 FREE SERPL-MCNC: 1.23 NG/DL (ref 0.93–1.7)
TRIGL SERPL-MCNC: 69 MG/DL (ref 0–150)
TSH SERPL DL<=0.005 MIU/L-ACNC: 1.73 MIU/ML (ref 0.27–4.2)
URINALYSIS REFLEX: ABNORMAL
UROBILINOGEN UR STRIP-MCNC: 0.2 MG/DL (ref 0.2–1)
VIT B12 SERPL-MCNC: 880 PG/ML (ref 211–946)
VLDLC SERPL CALC-MCNC: 13.8 MG/DL
WBC # BLD AUTO: 6.48 10*3/MM3 (ref 3.4–10.8)
WBC #/AREA URNS HPF: NORMAL /HPF

## 2019-08-14 LAB
HBA1C MFR BLD: 5.9 % (ref 4.8–5.6)
WRITTEN AUTHORIZATION: NORMAL

## 2019-08-20 ENCOUNTER — TELEPHONE (OUTPATIENT)
Dept: GASTROENTEROLOGY | Facility: CLINIC | Age: 69
End: 2019-08-20

## 2019-08-22 ENCOUNTER — PREP FOR SURGERY (OUTPATIENT)
Dept: OTHER | Facility: HOSPITAL | Age: 69
End: 2019-08-22

## 2019-08-22 DIAGNOSIS — Z12.11 SCREEN FOR COLON CANCER: Primary | ICD-10-CM

## 2019-08-30 PROBLEM — Z12.11 SCREEN FOR COLON CANCER: Status: ACTIVE | Noted: 2019-08-30

## 2019-08-30 NOTE — TELEPHONE ENCOUNTER
CALLED AND SPOKE WITH PATIENT.  SCHEDULED AT [facility] 11/26/2019 AT 1:30PM - ARRIVE 12:30PM.  WILL MAIL INSTRUCTIONS.

## 2019-09-04 ENCOUNTER — OFFICE VISIT (OUTPATIENT)
Dept: OBSTETRICS AND GYNECOLOGY | Facility: CLINIC | Age: 69
End: 2019-09-04

## 2019-09-04 VITALS
HEIGHT: 63 IN | SYSTOLIC BLOOD PRESSURE: 124 MMHG | DIASTOLIC BLOOD PRESSURE: 82 MMHG | BODY MASS INDEX: 31.54 KG/M2 | WEIGHT: 178 LBS

## 2019-09-04 DIAGNOSIS — N76.0 ACUTE VAGINITIS: ICD-10-CM

## 2019-09-04 DIAGNOSIS — R31.9 HEMATURIA, UNSPECIFIED TYPE: ICD-10-CM

## 2019-09-04 DIAGNOSIS — Z11.51 ENCOUNTER FOR SCREENING FOR HUMAN PAPILLOMAVIRUS (HPV): ICD-10-CM

## 2019-09-04 DIAGNOSIS — R10.31 ABDOMINAL PAIN, RLQ: ICD-10-CM

## 2019-09-04 DIAGNOSIS — Z01.419 WELL WOMAN EXAM: Primary | ICD-10-CM

## 2019-09-04 PROCEDURE — G0101 CA SCREEN;PELVIC/BREAST EXAM: HCPCS | Performed by: OBSTETRICS & GYNECOLOGY

## 2019-09-04 PROCEDURE — 99213 OFFICE O/P EST LOW 20 MIN: CPT | Performed by: OBSTETRICS & GYNECOLOGY

## 2019-09-04 NOTE — PROGRESS NOTES
GYN Annual Exam     CC- Here for annual exam.     Suly Ware is a 68 y.o. female established patient who presents for annual well woman exam. No  VB. She is still having malodorous vaginal discharge. She also has RLQ pain off and on ( happens once a week). She has blood in her urine again, has no h/o gross hematuria. She saw urology a decade ago for a similar issue but does not know if her workup was normal. Her C scope is scheduled.       OB History      Para Term  AB Living    3 2 2   1 1    SAB TAB Ectopic Molar Multiple Live Births    1                  Obstetric Comments    2 C/S  1 SAB with D&C          Menarche:16  Menopause:55  HRT:none  Current contraception: post menopausal status and tubal ligation  History of abnormal Pap smear: no   History of abnormal mammogram: no  Family history of uterine, colon or ovarian cancer: no  Family history of breast cancer: yes - m aunt breast cancer age 39, pt is BRCA neg  STD's: none  Last pap smear:2017- nl pap  Gardasil: none  AMNA: personal history-DVT/PE, family h/o - dad with DVT      Health Maintenance   Topic Date Due   • ZOSTER VACCINE (2 of 3) 01/10/2014   • HEPATITIS C SCREENING  2016   • COLONOSCOPY  2016   • PNEUMOCOCCAL VACCINES (65+ LOW/MEDIUM RISK) (1 of 2 - PCV13) 2017   • INFLUENZA VACCINE  2019   • DXA SCAN  2019   • MEDICARE ANNUAL WELLNESS  2020   • MAMMOGRAM  2021   • TDAP/TD VACCINES (2 - Td) 2029       Past Medical History:   Diagnosis Date   • Acute serous otitis media    • Acute sinusitis    • Arthralgia    • Depression    • DVT of lower extremity (deep venous thrombosis) (CMS/HCC)    • Migraine headache    • PFO (patent foramen ovale)    • Profound fatigue     when walking upstairs   • Pulmonary embolus (CMS/HCC) 2000    from OCPs, thrombophilia w/u neg   • Shoulder pain        Past Surgical History:   Procedure Laterality Date   • APPENDECTOMY     •  SECTION       x 2   • COLONOSCOPY     • DILATATION AND CURETTAGE     • EXPLORATORY LAPAROTOMY      dermoid removed   • HX OVARIAN CYSTECTOMY     • TUBAL ABDOMINAL LIGATION           Current Outpatient Medications:   •  escitalopram (LEXAPRO) 10 MG tablet, Take 1 tablet by mouth Daily., Disp: 90 tablet, Rfl: 0  •  metroNIDAZOLE (METROGEL) 0.75 % vaginal gel, INSERT 1 APPLICATOR(S)FUL EVERY DAY BY VAGINAL ROUTE AT BEDTIME FOR 7 DAYS., Disp: 70 g, Rfl: 1    Allergies   Allergen Reactions   • Sulfites        Social History     Tobacco Use   • Smoking status: Former Smoker     Start date:      Last attempt to quit:      Years since quittin.6   • Smokeless tobacco: Never Used   Substance Use Topics   • Alcohol use: Yes     Alcohol/week: 1.2 oz     Types: 1 Cans of beer, 1 Shots of liquor per week     Comment: social drinker   • Drug use: No       Family History   Problem Relation Age of Onset   • Stroke Mother    • Hyperlipidemia Mother    • Heart disease Mother    • Heart attack Mother    • Diabetes Mother    • Osteoporosis Mother    • Stroke Father    • Aortic aneurysm Father    • Deep vein thrombosis Father    • Stroke Brother    • Hyperlipidemia Brother    • Heart disease Brother    • Breast cancer Maternal Aunt    • No Known Problems Daughter    • No Known Problems Daughter    • Ovarian cancer Neg Hx    • Colon cancer Neg Hx    • Colon polyps Neg Hx        Review of Systems   Constitutional: Negative for appetite change, fatigue, fever and unexpected weight change.   Eyes: Negative for photophobia and visual disturbance.   Respiratory: Negative for cough and shortness of breath.    Cardiovascular: Negative for chest pain and palpitations.   Gastrointestinal: Negative for abdominal distention, abdominal pain, constipation, diarrhea and nausea.   Endocrine: Negative for cold intolerance and heat intolerance.   Genitourinary: Positive for pelvic pain (RLQ pain) and vaginal discharge. Negative for dyspareunia, dysuria,  "frequency, genital sores, hematuria and menstrual problem.   Musculoskeletal: Negative for back pain.   Skin: Negative for color change and rash.   Neurological: Negative for headaches.   Hematological: Negative for adenopathy. Does not bruise/bleed easily.   Psychiatric/Behavioral: Negative for dysphoric mood. The patient is not nervous/anxious.        /82   Ht 160 cm (62.99\")   Wt 80.7 kg (178 lb)   BMI 31.54 kg/m²     Physical Exam   Constitutional: She is oriented to person, place, and time. She appears well-developed and well-nourished.   HENT:   Head: Normocephalic and atraumatic.   Eyes: Conjunctivae are normal. No scleral icterus.   Neck: Neck supple. No thyromegaly present.   Cardiovascular: Normal rate and regular rhythm.   Pulmonary/Chest: Effort normal and breath sounds normal. Right breast exhibits no inverted nipple, no mass, no nipple discharge, no skin change and no tenderness. Left breast exhibits no inverted nipple, no mass, no nipple discharge, no skin change and no tenderness.   Abdominal: Soft. Bowel sounds are normal. She exhibits no distension and no mass. There is no tenderness. There is no rebound and no guarding. No hernia.   Genitourinary: Pelvic exam was performed with patient supine. There is no rash, tenderness or lesion on the right labia. There is no rash, tenderness or lesion on the left labia. Uterus is not deviated, not enlarged, not fixed and not tender. Cervix exhibits no motion tenderness, no discharge and no friability. Right adnexum displays tenderness. Right adnexum displays no mass and no fullness. Left adnexum displays no mass, no tenderness and no fullness. No erythema, tenderness or bleeding in the vagina. No foreign body in the vagina. No signs of injury around the vagina. No vaginal discharge found.   Neurological: She is alert and oriented to person, place, and time.   Skin: Skin is warm and dry.   Psychiatric: She has a normal mood and affect. Her behavior is " normal. Judgment and thought content normal.   Nursing note and vitals reviewed.         Assessment/Plan    1) GYN HM: pap  SBE demonstrated and encouraged.  2) STD screening: declines Condoms encouraged.  3) Bone health - Weight bearing exercise, dietary calcium recommendations and vitamin D reviewed.   4) Diet and Exercise discussed  5) Smoking Status: No  6) Vaginal discharge- check NuSwab Y/L/M  7)MMG: UTD 7/2019- normal  8) DEXA-due, will schedule  9)C scope-has scheduled   10) RLQ pain- check TVUS.   11) Hematuria- check UA and CS. Refer to urology for workup.    Follow up prn and 1 year       Suly was seen today for gynecologic exam and vaginitis.    Diagnoses and all orders for this visit:    Well woman exam  -     POC Urinalysis Dipstick  -     Pap IG (Image Guided)    Hematuria, unspecified type  -     Ambulatory Referral to Urology  -     Urinalysis With Microscopic - Urine, Clean Catch  -     Urine Culture - Urine, Urine, Clean Catch    Acute vaginitis  -     Genital Mycoplasmas JUSTA, Swab - Swab, Vagina  -     NuSwab BV & Candida - Swab, Vagina    Encounter for screening for human papillomavirus (HPV)  -     Pap IG (Image Guided)    Abdominal pain, RLQ        Rosa Beltre MD  9/4/19    7:58 PM

## 2019-09-05 ENCOUNTER — PROCEDURE VISIT (OUTPATIENT)
Dept: OBSTETRICS AND GYNECOLOGY | Facility: CLINIC | Age: 69
End: 2019-09-05

## 2019-09-05 DIAGNOSIS — R10.2 PELVIC PAIN IN FEMALE: Primary | ICD-10-CM

## 2019-09-05 PROCEDURE — 76830 TRANSVAGINAL US NON-OB: CPT | Performed by: OBSTETRICS & GYNECOLOGY

## 2019-09-06 LAB
APPEARANCE UR: CLEAR
BACTERIA #/AREA URNS HPF: ABNORMAL /HPF
BACTERIA UR CULT: NORMAL
BACTERIA UR CULT: NORMAL
BILIRUB UR QL STRIP: NEGATIVE
CASTS URNS MICRO: ABNORMAL
COLOR UR: YELLOW
CYTOLOGIST CVX/VAG CYTO: ABNORMAL
CYTOLOGY CVX/VAG DOC CYTO: ABNORMAL
CYTOLOGY CVX/VAG DOC THIN PREP: ABNORMAL
DX ICD CODE: ABNORMAL
DX ICD CODE: ABNORMAL
EPI CELLS #/AREA URNS HPF: ABNORMAL /HPF
GLUCOSE UR QL: NEGATIVE
HGB UR QL STRIP: (no result)
HIV 1 & 2 AB SER-IMP: ABNORMAL
KETONES UR QL STRIP: NEGATIVE
LEUKOCYTE ESTERASE UR QL STRIP: (no result)
NITRITE UR QL STRIP: NEGATIVE
OTHER STN SPEC: ABNORMAL
PATHOLOGIST CVX/VAG CYTO: ABNORMAL
PH UR STRIP: 6.5 [PH] (ref 5–8)
PROT UR QL STRIP: NEGATIVE
RBC #/AREA URNS HPF: ABNORMAL /HPF
SP GR UR: 1.01 (ref 1–1.03)
STAT OF ADQ CVX/VAG CYTO-IMP: ABNORMAL
UROBILINOGEN UR STRIP-MCNC: (no result) MG/DL
WBC #/AREA URNS HPF: ABNORMAL /HPF

## 2019-09-06 NOTE — PROGRESS NOTES
PIP= pelvic ultrasound is normal, ovaries are not visualized.  This is common in menopause.  (Uterus measures 6.5 cm and is anteverted, endometrial lining of 0.45 centimeters and ovaries are not seen.  No comparable data)

## 2019-09-06 NOTE — PROGRESS NOTES
PIP= patient has a normal urine culture.  She still has some blood in her urine.  Recommend evaluation by urology given this persistent finding.

## 2019-09-07 LAB
A VAGINAE DNA VAG QL NAA+PROBE: NORMAL SCORE
BVAB2 DNA VAG QL NAA+PROBE: NORMAL SCORE
C ALBICANS DNA VAG QL NAA+PROBE: NEGATIVE
C GLABRATA DNA VAG QL NAA+PROBE: NEGATIVE
LABORATORY COMMENT REPORT: NORMAL
M GENITALIUM DNA SPEC QL NAA+PROBE: NEGATIVE
M HOMINIS DNA SPEC QL NAA+PROBE: NEGATIVE
MEGA1 DNA VAG QL NAA+PROBE: NORMAL SCORE
UREAPLASMA DNA SPEC QL NAA+PROBE: NEGATIVE

## 2019-09-08 NOTE — PROGRESS NOTES
PIP= vaginal swabs are normal, no infections detected. She does have ab abnormal pap smear  ( LGSIL) and needs colpo scheduled.

## 2019-09-09 ENCOUNTER — HOSPITAL ENCOUNTER (OUTPATIENT)
Dept: BONE DENSITY | Facility: HOSPITAL | Age: 69
Discharge: HOME | End: 2019-09-09
Admitting: FAMILY MEDICINE

## 2019-09-09 DIAGNOSIS — Z78.0 POST-MENOPAUSAL: ICD-10-CM

## 2019-09-09 PROCEDURE — 77080 DXA BONE DENSITY AXIAL: CPT

## 2019-10-24 RX ORDER — ESCITALOPRAM OXALATE 10 MG/1
10 TABLET ORAL DAILY
Qty: 90 TABLET | Refills: 3 | Status: SHIPPED | OUTPATIENT
Start: 2019-10-24 | End: 2019-11-04

## 2019-11-04 ENCOUNTER — OFFICE VISIT (OUTPATIENT)
Dept: FAMILY MEDICINE CLINIC | Facility: CLINIC | Age: 69
End: 2019-11-04

## 2019-11-04 VITALS
BODY MASS INDEX: 31.57 KG/M2 | HEIGHT: 63 IN | WEIGHT: 178.2 LBS | SYSTOLIC BLOOD PRESSURE: 120 MMHG | DIASTOLIC BLOOD PRESSURE: 77 MMHG | OXYGEN SATURATION: 98 % | TEMPERATURE: 97.5 F | HEART RATE: 70 BPM

## 2019-11-04 DIAGNOSIS — Z23 NEED FOR PNEUMOCOCCAL VACCINATION: ICD-10-CM

## 2019-11-04 DIAGNOSIS — M85.851 OSTEOPENIA OF BOTH HIPS: ICD-10-CM

## 2019-11-04 DIAGNOSIS — M85.852 OSTEOPENIA OF BOTH HIPS: ICD-10-CM

## 2019-11-04 DIAGNOSIS — F33.42 RECURRENT MAJOR DEPRESSIVE DISORDER, IN FULL REMISSION (HCC): Primary | ICD-10-CM

## 2019-11-04 PROCEDURE — G0009 ADMIN PNEUMOCOCCAL VACCINE: HCPCS | Performed by: FAMILY MEDICINE

## 2019-11-04 PROCEDURE — 99213 OFFICE O/P EST LOW 20 MIN: CPT | Performed by: FAMILY MEDICINE

## 2019-11-04 PROCEDURE — 90670 PCV13 VACCINE IM: CPT | Performed by: FAMILY MEDICINE

## 2019-11-04 NOTE — PROGRESS NOTES
Subjective   Suly Ware is a 69 y.o. female.     Chief Complaint   Patient presents with   • Depression     PT HAS STOP THE MEDICATION        History of Present Illness Suly Ware is a 69-year-old female patient who came here for follow-up on depression and anxiety.  Patient's her depression has improved.  She was also started on Lexapro but she stopped taking medication secondary to increased sedation.  She has made some changes in her lifestyle and that has helped her with her depression.  Also has planned to switch to part-time from full-time job starting from 2020.  Patient is also here to discuss her bone density results.    Past Medical History:   Diagnosis Date   • Acute serous otitis media    • Acute sinusitis    • Allergic     Pollen, sulfites   • Anxiety ura2000   • Arthralgia    • Depression    • DVT of lower extremity (deep venous thrombosis) (CMS/HCC)    • Migraine headache    • Osteopenia    • PFO (patent foramen ovale)    • Profound fatigue     when walking upstairs   • Pulmonary embolus (CMS/HCC)     from OCPs, thrombophilia w/u neg   • Shoulder pain        Past Surgical History:   Procedure Laterality Date   • APPENDECTOMY     •  SECTION      x 2   • COLONOSCOPY     • DILATATION AND CURETTAGE     • EXPLORATORY LAPAROTOMY      dermoid removed   • HX OVARIAN CYSTECTOMY     • TUBAL ABDOMINAL LIGATION         Family History   Problem Relation Age of Onset   • Stroke Mother    • Hyperlipidemia Mother    • Heart disease Mother    • Heart attack Mother    • Diabetes Mother    • Osteoporosis Mother    • Arthritis Mother    • Asthma Mother    • Depression Mother    • Hearing loss Mother    • Miscarriages / Stillbirths Mother    • Stroke Father    • Aortic aneurysm Father    • Deep vein thrombosis Father    • Stroke Brother    • Hyperlipidemia Brother    • Heart disease Brother    • Breast cancer Maternal Aunt    • No Known Problems Daughter    • No Known Problems  "Daughter    • Ovarian cancer Neg Hx    • Colon cancer Neg Hx    • Colon polyps Neg Hx        Social History     Socioeconomic History   • Marital status:      Spouse name: Not on file   • Number of children: Not on file   • Years of education: Not on file   • Highest education level: Not on file   Tobacco Use   • Smoking status: Former Smoker     Start date:      Last attempt to quit:      Years since quittin.8   • Smokeless tobacco: Never Used   Substance and Sexual Activity   • Alcohol use: Yes     Alcohol/week: 1.2 oz     Types: 1 Cans of beer, 1 Shots of liquor per week     Comment: social drinker   • Drug use: No   • Sexual activity: No     Partners: Male     Birth control/protection: Surgical, Post-menopausal     Comment: BTL       The following portions of the patient's history were reviewed and updated as appropriate: allergies, current medications, past family history, past medical history, past social history, past surgical history and problem list.    Review of Systems   Constitutional: Negative for activity change, appetite change, fatigue, fever, unexpected weight gain and unexpected weight loss.   HENT: Negative for congestion, ear pain, postnasal drip, rhinorrhea, sinus pressure and sore throat.    Eyes: Negative for blurred vision, discharge, itching and visual disturbance.   Respiratory: Negative for cough, chest tightness, shortness of breath and wheezing.    Cardiovascular: Negative for chest pain, palpitations and leg swelling.   Gastrointestinal: Negative for abdominal pain, constipation, diarrhea, nausea and vomiting.   Musculoskeletal: Negative for arthralgias.   Neurological: Negative for headache.   Psychiatric/Behavioral: Negative for agitation, suicidal ideas, depressed mood and stress.       Objective   Vitals:    19 0802   BP: 120/77   Pulse: 70   Temp: 97.5 °F (36.4 °C)   TempSrc: Oral   SpO2: 98%   Weight: 80.8 kg (178 lb 3.2 oz)   Height: 160 cm (62.99\") "     Body mass index is 31.57 kg/m².  Physical Exam   Constitutional: She is oriented to person, place, and time. She appears well-developed and well-nourished. No distress.   HENT:   Head: Normocephalic and atraumatic.   Mouth/Throat: Oropharynx is clear and moist.   Eyes: EOM are normal. Pupils are equal, round, and reactive to light. Right eye exhibits no discharge. Left eye exhibits no discharge.   Neck: Normal range of motion. Neck supple.   Cardiovascular: Normal rate, regular rhythm and normal heart sounds.   Pulmonary/Chest: Effort normal and breath sounds normal. She has no wheezes. She has no rales.   Abdominal: Soft. Bowel sounds are normal. She exhibits no mass. There is no tenderness.   Musculoskeletal: She exhibits no edema.   Lymphadenopathy:     She has no cervical adenopathy.   Neurological: She is alert and oriented to person, place, and time.   Psychiatric: She has a normal mood and affect. Her behavior is normal. Judgment and thought content normal.       Assessment/Plan   Problem List Items Addressed This Visit        Musculoskeletal and Integument    Osteopenia       Other    Depression - Primary      Other Visit Diagnoses     Need for pneumococcal vaccination            Patient was seen today here for follow-up on her depression and osteopenia.  As patient's depression has improved.  Advised her not to restart on the medication.  Continue meditation and regular exercise.  Also discussed bone density results with patient.  Her ten-year risk for osteoporotic fracture is 9.7.  Discussed the medication for osteopenia because of increased risk for fracture.  Patient told told me that recently she diagnosed with multiple jaw fractures and her dentist has told her not to start the medication.  I will hold the medication for now.  Advised her to start vitamin D and calcium and weightbearing exercises.  Also discussed the pneumonia vaccine.  We will give her Prevnar 13 today        Return in about 1  year (around 11/4/2020), or if symptoms worsen or fail to improve, for Annual physical.

## 2019-11-26 ENCOUNTER — ANESTHESIA (OUTPATIENT)
Dept: GASTROENTEROLOGY | Facility: HOSPITAL | Age: 69
End: 2019-11-26

## 2019-11-26 ENCOUNTER — ANESTHESIA EVENT (OUTPATIENT)
Dept: GASTROENTEROLOGY | Facility: HOSPITAL | Age: 69
End: 2019-11-26

## 2019-11-26 ENCOUNTER — HOSPITAL ENCOUNTER (OUTPATIENT)
Facility: HOSPITAL | Age: 69
Setting detail: HOSPITAL OUTPATIENT SURGERY
Discharge: HOME | End: 2019-11-26
Attending: INTERNAL MEDICINE | Admitting: INTERNAL MEDICINE

## 2019-11-26 VITALS
BODY MASS INDEX: 30.65 KG/M2 | OXYGEN SATURATION: 96 % | WEIGHT: 173 LBS | HEIGHT: 63 IN | SYSTOLIC BLOOD PRESSURE: 114 MMHG | RESPIRATION RATE: 15 BRPM | TEMPERATURE: 98.1 F | DIASTOLIC BLOOD PRESSURE: 68 MMHG | HEART RATE: 68 BPM

## 2019-11-26 DIAGNOSIS — Z12.11 SCREEN FOR COLON CANCER: ICD-10-CM

## 2019-11-26 PROCEDURE — 45385 COLONOSCOPY W/LESION REMOVAL: CPT | Performed by: INTERNAL MEDICINE

## 2019-11-26 PROCEDURE — 25010000002 PROPOFOL 10 MG/ML EMULSION: Performed by: ANESTHESIOLOGY

## 2019-11-26 PROCEDURE — 88305 TISSUE EXAM BY PATHOLOGIST: CPT | Performed by: INTERNAL MEDICINE

## 2019-11-26 RX ORDER — LIDOCAINE HYDROCHLORIDE 20 MG/ML
INJECTION, SOLUTION INFILTRATION; PERINEURAL AS NEEDED
Status: DISCONTINUED | OUTPATIENT
Start: 2019-11-26 | End: 2019-11-26 | Stop reason: SURG

## 2019-11-26 RX ORDER — PROPOFOL 10 MG/ML
VIAL (ML) INTRAVENOUS AS NEEDED
Status: DISCONTINUED | OUTPATIENT
Start: 2019-11-26 | End: 2019-11-26 | Stop reason: SURG

## 2019-11-26 RX ORDER — SODIUM CHLORIDE, SODIUM LACTATE, POTASSIUM CHLORIDE, CALCIUM CHLORIDE 600; 310; 30; 20 MG/100ML; MG/100ML; MG/100ML; MG/100ML
1000 INJECTION, SOLUTION INTRAVENOUS CONTINUOUS
Status: DISCONTINUED | OUTPATIENT
Start: 2019-11-26 | End: 2019-11-26 | Stop reason: HOSPADM

## 2019-11-26 RX ORDER — PROPOFOL 10 MG/ML
VIAL (ML) INTRAVENOUS CONTINUOUS PRN
Status: DISCONTINUED | OUTPATIENT
Start: 2019-11-26 | End: 2019-11-26 | Stop reason: SURG

## 2019-11-26 RX ORDER — SODIUM CHLORIDE 0.9 % (FLUSH) 0.9 %
10 SYRINGE (ML) INJECTION AS NEEDED
Status: DISCONTINUED | OUTPATIENT
Start: 2019-11-26 | End: 2019-11-26 | Stop reason: HOSPADM

## 2019-11-26 RX ORDER — LIDOCAINE HYDROCHLORIDE 10 MG/ML
0.5 INJECTION, SOLUTION INFILTRATION; PERINEURAL ONCE AS NEEDED
Status: DISCONTINUED | OUTPATIENT
Start: 2019-11-26 | End: 2019-11-26 | Stop reason: HOSPADM

## 2019-11-26 RX ADMIN — PROPOFOL 140 MCG/KG/MIN: 10 INJECTION, EMULSION INTRAVENOUS at 13:10

## 2019-11-26 RX ADMIN — LIDOCAINE HYDROCHLORIDE 60 MG: 20 INJECTION, SOLUTION INFILTRATION; PERINEURAL at 13:10

## 2019-11-26 RX ADMIN — SODIUM CHLORIDE, POTASSIUM CHLORIDE, SODIUM LACTATE AND CALCIUM CHLORIDE 1000 ML: 600; 310; 30; 20 INJECTION, SOLUTION INTRAVENOUS at 12:56

## 2019-11-26 RX ADMIN — PROPOFOL 100 MG: 10 INJECTION, EMULSION INTRAVENOUS at 13:10

## 2019-11-26 NOTE — ANESTHESIA POSTPROCEDURE EVALUATION
Patient: Suly Ware    Procedure Summary     Date:  11/26/19 Room / Location:  [facility] ENDOSCOPY 10 /  [facility]    Anesthesia Start:  1305 Anesthesia Stop:  1342    Procedure:  COLONOSCOPY INTO CECUM & TERMINAL ILEUM  WITH COLD SNARE POLYPECTOMY (N/A ) Diagnosis:       Screen for colon cancer      Colon polyp      (Screen for colon cancer [Z12.11])    Surgeon:  Brandon Nielsen MD Provider:  Ilana Izaguirre MD    Anesthesia Type:  MAC ASA Status:  2          Anesthesia Type: MAC  Last vitals  BP   119/72 (11/26/19 1352)   Temp   36.7 °C (98.1 °F) (11/26/19 1245)   Pulse   64 (11/26/19 1352)   Resp   14 (11/26/19 1352)     SpO2   96 % (11/26/19 1352)     Post Anesthesia Care and Evaluation    Patient location during evaluation: bedside  Patient participation: complete - patient participated  Level of consciousness: awake and alert  Pain management: adequate  Airway patency: patent  Anesthetic complications: No anesthetic complications  PONV Status: controlled  Cardiovascular status: acceptable  Respiratory status: acceptable  Hydration status: acceptable

## 2019-11-26 NOTE — ANESTHESIA PREPROCEDURE EVALUATION
Anesthesia Evaluation     Patient summary reviewed and Nursing notes reviewed   NPO Solid Status: > 8 hours  NPO Liquid Status: > 2 hours           Airway   Mallampati: II  TM distance: >3 FB  Neck ROM: full  No difficulty expected  Dental      Pulmonary - normal exam   (+) pulmonary embolism,   Cardiovascular - normal exam    ECG reviewed    (+) DVT,       Neuro/Psych  (+) headaches, psychiatric history Anxiety and Depression,     GI/Hepatic/Renal/Endo      Musculoskeletal     Abdominal  - normal exam   Substance History      OB/GYN          Other                        Anesthesia Plan    ASA 2     MAC     intravenous induction     Anesthetic plan, all risks, benefits, and alternatives have been provided, discussed and informed consent has been obtained with: patient.

## 2019-11-27 ENCOUNTER — PROCEDURE VISIT (OUTPATIENT)
Dept: OBSTETRICS AND GYNECOLOGY | Facility: CLINIC | Age: 69
End: 2019-11-27

## 2019-11-27 VITALS
SYSTOLIC BLOOD PRESSURE: 110 MMHG | BODY MASS INDEX: 31.36 KG/M2 | WEIGHT: 177 LBS | HEIGHT: 63 IN | DIASTOLIC BLOOD PRESSURE: 70 MMHG

## 2019-11-27 DIAGNOSIS — R87.612 LGSIL ON PAP SMEAR OF CERVIX: ICD-10-CM

## 2019-11-27 DIAGNOSIS — R87.619 ABNORMAL CERVICAL PAPANICOLAOU SMEAR, UNSPECIFIED ABNORMAL PAP FINDING: Primary | ICD-10-CM

## 2019-11-27 DIAGNOSIS — N76.0 RECURRENT VAGINITIS: ICD-10-CM

## 2019-11-27 DIAGNOSIS — R31.1 BENIGN ESSENTIAL MICROSCOPIC HEMATURIA: ICD-10-CM

## 2019-11-27 LAB
BILIRUB BLD-MCNC: NEGATIVE MG/DL
CLARITY, POC: CLEAR
COLOR UR: YELLOW
GLUCOSE UR STRIP-MCNC: NEGATIVE MG/DL
KETONES UR QL: NEGATIVE
LAB AP CASE REPORT: NORMAL
LEUKOCYTE EST, POC: NEGATIVE
NITRITE UR-MCNC: NEGATIVE MG/ML
PATH REPORT.FINAL DX SPEC: NORMAL
PATH REPORT.GROSS SPEC: NORMAL
PH UR: 5 [PH] (ref 5–8)
PROT UR STRIP-MCNC: NEGATIVE MG/DL
RBC # UR STRIP: NEGATIVE /UL
SP GR UR: 1 (ref 1–1.03)
UROBILINOGEN UR QL: NORMAL

## 2019-11-27 PROCEDURE — 81002 URINALYSIS NONAUTO W/O SCOPE: CPT | Performed by: OBSTETRICS & GYNECOLOGY

## 2019-11-27 PROCEDURE — 99213 OFFICE O/P EST LOW 20 MIN: CPT | Performed by: OBSTETRICS & GYNECOLOGY

## 2019-11-27 PROCEDURE — 57454 BX/CURETT OF CERVIX W/SCOPE: CPT | Performed by: OBSTETRICS & GYNECOLOGY

## 2019-11-27 RX ORDER — METRONIDAZOLE 500 MG/1
500 TABLET ORAL 2 TIMES DAILY
Qty: 28 TABLET | Refills: 1 | Status: SHIPPED | OUTPATIENT
Start: 2019-11-27 | End: 2019-12-11

## 2019-11-27 NOTE — PROGRESS NOTES
"      Suly Ware is a 69 y.o. patient who presents for follow up of   Chief Complaint   Patient presents with   • Colposcopy       70 yo est pt here for colpo and discussion of recurrent vaginitis. She has vaginal discharge and all of her swabs are always normal . She has had some relief with MetroGel and Flagyl, however, her symptoms always return.  She was using boric acid capsules daily and that did help but she stopped.  She has had a normal transvaginal ultrasound in September 2019.  We discussed abnormal Pap smears and the etiology of dysplasia at 69.  He also saw urology for hematuria and was diagnosed with benign hematuria.      The following portions of the patient's history were reviewed and updated as appropriate: allergies, current medications and problem list.    Review of Systems   Genitourinary: Positive for vaginal discharge. Negative for pelvic pain, vaginal bleeding and vaginal pain.   Musculoskeletal: Negative for back pain.   All other systems reviewed and are negative.      /70   Ht 160 cm (63\")   Wt 80.3 kg (177 lb)   BMI 31.35 kg/m²     Physical Exam   Constitutional: She is oriented to person, place, and time. She appears well-developed and well-nourished.   HENT:   Head: Normocephalic and atraumatic.   Eyes: Conjunctivae are normal. No scleral icterus.   Abdominal: Soft. She exhibits no distension and no mass. There is no tenderness. There is no rebound and no guarding. No hernia.   Genitourinary: Pelvic exam was performed with patient supine. There is no rash, tenderness, lesion or injury on the right labia. There is no rash, tenderness, lesion or injury on the left labia. Cervix exhibits no motion tenderness, no discharge and no friability. No erythema, tenderness or bleeding in the vagina. No foreign body in the vagina. No signs of injury around the vagina. Vaginal discharge found.       Neurological: She is alert and oriented to person, place, and time.   Skin: Skin is " warm and dry.   Psychiatric: She has a normal mood and affect. Her behavior is normal. Judgment and thought content normal.   Nursing note and vitals reviewed.      A/P:  1. LGSIL- colpo inadequate, only 70 % TZ seen. S/P bx at 11 & 6 o;clock. Colpo imp PEARL 1. Will call with results and follow-up.  2.  Recurrent vaginitis- all swabs have been negative.  Will treat with Flagyl 500 mg twice daily for 14 days.  Encourage boric acid capsules daily and probiotic with lactobacillus twice daily  3. B9 hematuria-no further work-up indicated    Assessment/Plan   Suly was seen today for colposcopy.    Diagnoses and all orders for this visit:    Abnormal cervical Papanicolaou smear, unspecified abnormal pap finding  -     Reference Histopathology    Benign essential microscopic hematuria  -     POC Urinalysis Dipstick    LGSIL on Pap smear of cervix    Recurrent vaginitis                 No Follow-up on file.      Rosa Beltre MD    11/27/2019  3:56 PM

## 2019-11-27 NOTE — PROGRESS NOTES
Colposcopy Procedure Note    Procedures          Indications: Most recent Pap smear showed: low-grade squamous intraepithelial neoplasia (LGSIL - encompassing HPV,mild dysplasia,PEARL I)    Prior cervical/vaginal disease: normal exam without visible pathology and This is her first abnormal pap  Prior cervical treatment: no treatment.  Contraception: post menopausal status and tubal ligation    Procedure Details   The risks and benefits of the procedure and Verbal informed consent obtained.  Pregnancy test: not done    Speculum placed in vagina and excellent visualization of cervix achieved, cervix swabbed x 3 with acetic acid solution and Lugol's solution     Findings:  Cervix: acetowhite lesion(s) noted at 11 & 6 o'clock; cervix swabbed with Lugol's solution, endocervical curettage performed, cervical biopsies taken at 11 & 6 o'clock, specimen labelled and sent to pathology and hemostasis achieved with Monsel's solution.  Vaginal inspection: normal without visible lesions.  Vulvar colposcopy: vulvar colposcopy not performed.  Colpo adequacy: was not adquate    Specimens: bx 11, 6 and ECC    Complications: none.   Patient tolerated procedure well.     Smoking Status: No      Plan:  Specimens labelled and sent to Pathology.  Will base further treatment on Pathology findings.  Treatment options discussed with patient.  Post biopsy instructions given to patient.    Rosa Beltre MD   11/27/2019  3:36 PM

## 2019-12-03 LAB
DX ICD CODE: NORMAL
PATH REPORT.FINAL DX SPEC: NORMAL
PATH REPORT.GROSS SPEC: NORMAL
PATH REPORT.RELEVANT HX SPEC: NORMAL
PATH REPORT.SITE OF ORIGIN SPEC: NORMAL
PATHOLOGIST NAME: NORMAL
PAYMENT PROCEDURE: NORMAL

## 2020-01-28 ENCOUNTER — OFFICE VISIT (OUTPATIENT)
Dept: FAMILY MEDICINE CLINIC | Facility: CLINIC | Age: 70
End: 2020-01-28

## 2020-01-28 VITALS
DIASTOLIC BLOOD PRESSURE: 88 MMHG | OXYGEN SATURATION: 96 % | TEMPERATURE: 98.6 F | SYSTOLIC BLOOD PRESSURE: 120 MMHG | BODY MASS INDEX: 31.61 KG/M2 | HEIGHT: 63 IN | HEART RATE: 74 BPM | WEIGHT: 178.4 LBS

## 2020-01-28 DIAGNOSIS — M54.50 ACUTE BILATERAL LOW BACK PAIN WITHOUT SCIATICA: Primary | ICD-10-CM

## 2020-01-28 DIAGNOSIS — R22.1 LUMP IN NECK: ICD-10-CM

## 2020-01-28 PROCEDURE — 99213 OFFICE O/P EST LOW 20 MIN: CPT | Performed by: FAMILY MEDICINE

## 2020-01-28 RX ORDER — CYCLOBENZAPRINE HCL 10 MG
TABLET ORAL
Qty: 15 TABLET | Refills: 0 | Status: SHIPPED | OUTPATIENT
Start: 2020-01-28 | End: 2020-12-14

## 2020-02-05 NOTE — PROGRESS NOTES
Subjective   Suly Ware is a 69 y.o. female.     Chief Complaint   Patient presents with   • Back Injury     Neck pain, feels strange and follows the CA down below the collar bone x 1 month, x 1 month back pain that goes down to hiips and feels like it tightens to where it limits moving.    • Neck Pain     No LMP recorded. Patient is postmenopausal.      History of Present Illness Suly Ware is a 69-year-old female patient who came here for low back pain for for 1 month.  Patient denies any fall or injury to back but gives history of moving the stuff in the house and did a lot of cleaning in the house during holidays.  Symptoms a started during holidays after cleaning cleaning in the house and moving the furniture.  She gives a history of back pain going to both hips.  Her pain got better but got worse after vacuuming in the house.  Denies any incontinence of bladder or bowel.  She is also complaining of drainage or feeling of lump in the neck coming down up to her collarbone.    Past Medical History:   Diagnosis Date   • Acute serous otitis media    • Acute sinusitis    • Allergic     Pollen, sulfites   • Anxiety uray    • Arthralgia    • Colon polyp    • Depression    • DVT of lower extremity (deep venous thrombosis) (CMS/HCC)    • LGSIL on Pap smear of cervix 2019   • Migraine headache    • Osteopenia    • PFO (patent foramen ovale)    • Profound fatigue     when walking upstairs   • Pulmonary embolus (CMS/HCC)     from OCPs, thrombophilia w/u neg   • Pulmonary embolus (CMS/HCC) 2000    from OCPs, thrombophilia w/u neg   • Shoulder pain        Past Surgical History:   Procedure Laterality Date   • APPENDECTOMY     •  SECTION      x 2   • COLONOSCOPY     • COLONOSCOPY N/A 2019    Procedure: COLONOSCOPY INTO CECUM & TERMINAL ILEUM  WITH COLD SNARE POLYPECTOMY;  Surgeon: Brandon Nielsen MD;  Location: [facility] ENDOSCOPY;  Service: Gastroenterology   •  DILATATION AND CURETTAGE     • EXPLORATORY LAPAROTOMY      dermoid removed   • HX OVARIAN CYSTECTOMY     • TUBAL ABDOMINAL LIGATION         Family History   Problem Relation Age of Onset   • Stroke Mother    • Hyperlipidemia Mother    • Heart disease Mother    • Heart attack Mother    • Diabetes Mother    • Osteoporosis Mother    • Arthritis Mother    • Asthma Mother    • Depression Mother    • Hearing loss Mother    • Miscarriages / Stillbirths Mother    • Stroke Father    • Aortic aneurysm Father    • Deep vein thrombosis Father    • Stroke Brother    • Hyperlipidemia Brother    • Heart disease Brother    • Breast cancer Maternal Aunt    • No Known Problems Daughter    • No Known Problems Daughter    • Ovarian cancer Neg Hx    • Colon cancer Neg Hx    • Colon polyps Neg Hx        Social History     Socioeconomic History   • Marital status:      Spouse name: Not on file   • Number of children: Not on file   • Years of education: Not on file   • Highest education level: Not on file   Tobacco Use   • Smoking status: Former Smoker     Start date:      Last attempt to quit:      Years since quittin.1   • Smokeless tobacco: Never Used   Substance and Sexual Activity   • Alcohol use: Yes     Alcohol/week: 2.0 standard drinks     Types: 1 Cans of beer, 1 Shots of liquor per week     Comment: social drinker   • Drug use: No   • Sexual activity: Never     Partners: Male     Birth control/protection: Surgical, Post-menopausal     Comment: BTL       The following portions of the patient's history were reviewed and updated as appropriate: allergies, current medications, past family history, past medical history, past social history, past surgical history and problem list.    Review of Systems   Constitutional: Negative for activity change, appetite change, fatigue, fever, unexpected weight gain and unexpected weight loss.   HENT: Positive for congestion. Negative for ear pain, hearing loss, postnasal drip,  "rhinorrhea, sinus pressure, sore throat, swollen glands, trouble swallowing and voice change.    Eyes: Negative for blurred vision, discharge, itching and visual disturbance.   Respiratory: Negative for cough, chest tightness, shortness of breath and wheezing.    Cardiovascular: Negative for chest pain, palpitations and leg swelling.   Gastrointestinal: Negative for abdominal pain, constipation, diarrhea, nausea and vomiting.   Musculoskeletal: Positive for back pain. Negative for arthralgias.   Neurological: Negative for headache.   Psychiatric/Behavioral: Negative for agitation, suicidal ideas, depressed mood and stress.       Objective   Vitals:    01/28/20 1605   BP: 120/88   Pulse: 74   Temp: 98.6 °F (37 °C)   TempSrc: Oral   SpO2: 96%   Weight: 80.9 kg (178 lb 6.4 oz)   Height: 160 cm (62.99\")     Body mass index is 31.61 kg/m².  Physical Exam   Constitutional: She is oriented to person, place, and time. She appears well-developed and well-nourished. No distress.   HENT:   Head: Normocephalic and atraumatic.   Mouth/Throat: Oropharynx is clear and moist.   Eyes: Pupils are equal, round, and reactive to light. EOM are normal. Right eye exhibits no discharge. Left eye exhibits no discharge.   Neck: Normal range of motion. Neck supple.   Cardiovascular: Normal rate, regular rhythm and normal heart sounds.   Pulmonary/Chest: Effort normal and breath sounds normal. She has no wheezes. She has no rales.   Abdominal: Soft. Bowel sounds are normal. She exhibits no mass. There is no tenderness.   Musculoskeletal: She exhibits no edema.        Lumbar back: She exhibits tenderness and spasm. She exhibits no bony tenderness and no swelling.   negative starlight leg raise test b/l   Lymphadenopathy:     She has no cervical adenopathy.   Neurological: She is alert and oriented to person, place, and time. She has normal strength and normal reflexes. Gait normal. She displays no Babinski's sign on the right side. She displays " no Babinski's sign on the left side.   Psychiatric: She has a normal mood and affect. Her behavior is normal.       Assessment/Plan   Problem List Items Addressed This Visit     None      Visit Diagnoses     Acute bilateral low back pain without sciatica    -  Primary    Lump in neck            Was seen today here for acute low back pain.  Advised her to continue ibuprofen twice daily for pain.  Rest and ice also recommended to patient.  I will also give her some muscle relaxant she can use in the nighttime only.  Is also complaining of feeling of lump in her neck on and off for last 1 month.  On examination I did not appreciate any lump in her neck.  Patient also has a history of allergic rhinitis and I am not sure if she is feeling a drainage.  Reassurance given to patient.  If she continues to feel the lump in her throat will refer her to ENT.        Return if symptoms worsen or fail to improve, for Recheck.

## 2020-02-21 ENCOUNTER — TRANSCRIBE ORDERS (OUTPATIENT)
Dept: ADMINISTRATIVE | Facility: HOSPITAL | Age: 70
End: 2020-02-21

## 2020-02-21 DIAGNOSIS — Z13.6 ENCOUNTER FOR SCREENING FOR VASCULAR DISEASE: Primary | ICD-10-CM

## 2020-02-28 ENCOUNTER — HOSPITAL ENCOUNTER (OUTPATIENT)
Dept: CARDIOLOGY | Facility: HOSPITAL | Age: 70
Discharge: HOME | End: 2020-02-28
Admitting: FAMILY MEDICINE

## 2020-02-28 VITALS
BODY MASS INDEX: 31.18 KG/M2 | SYSTOLIC BLOOD PRESSURE: 136 MMHG | DIASTOLIC BLOOD PRESSURE: 58 MMHG | HEART RATE: 62 BPM | WEIGHT: 176 LBS | HEIGHT: 63 IN

## 2020-02-28 DIAGNOSIS — Z13.6 ENCOUNTER FOR SCREENING FOR VASCULAR DISEASE: ICD-10-CM

## 2020-02-28 LAB
BH CV VAS BP LEFT ARM: NORMAL MMHG
BH CV VAS BP RIGHT ARM: NORMAL MMHG
BH CV XLRA MEAS LEFT ICA/CCA RATIO: 1.25
BH CV XLRA MEAS LEFT MID CCA PSV: NORMAL CM/SEC
BH CV XLRA MEAS LEFT MID ICA PSV: NORMAL CM/SEC
BH CV XLRA MEAS LEFT PROX ECA PSV: NORMAL CM/SEC
BH CV XLRA MEAS RIGHT ICA/CCA RATIO: 1.82
BH CV XLRA MEAS RIGHT MID CCA PSV: NORMAL CM/SEC
BH CV XLRA MEAS RIGHT MID ICA PSV: NORMAL CM/SEC
BH CV XLRA MEAS RIGHT PROX ECA PSV: NORMAL CM/SEC

## 2020-02-28 PROCEDURE — 93799 UNLISTED CV SVC/PROCEDURE: CPT

## 2020-06-01 ENCOUNTER — OFFICE VISIT (OUTPATIENT)
Dept: OBSTETRICS AND GYNECOLOGY | Facility: CLINIC | Age: 70
End: 2020-06-01

## 2020-06-01 VITALS
SYSTOLIC BLOOD PRESSURE: 116 MMHG | HEIGHT: 63 IN | BODY MASS INDEX: 32.2 KG/M2 | DIASTOLIC BLOOD PRESSURE: 80 MMHG | WEIGHT: 181.7 LBS

## 2020-06-01 DIAGNOSIS — Z01.419 ROUTINE GYNECOLOGICAL EXAMINATION: ICD-10-CM

## 2020-06-01 DIAGNOSIS — Z01.419 PAP SMEAR, LOW-RISK: Primary | ICD-10-CM

## 2020-06-01 DIAGNOSIS — R87.612 LGSIL ON PAP SMEAR OF CERVIX: ICD-10-CM

## 2020-06-01 DIAGNOSIS — Z12.31 ENCOUNTER FOR SCREENING MAMMOGRAM FOR MALIGNANT NEOPLASM OF BREAST: ICD-10-CM

## 2020-06-01 DIAGNOSIS — Z13.9 SCREENING FOR CONDITION: ICD-10-CM

## 2020-06-01 LAB
BILIRUB BLD-MCNC: NEGATIVE MG/DL
CLARITY, POC: CLEAR
COLOR UR: YELLOW
GLUCOSE UR STRIP-MCNC: NEGATIVE MG/DL
KETONES UR QL: NEGATIVE
LEUKOCYTE EST, POC: NEGATIVE
NITRITE UR-MCNC: NEGATIVE MG/ML
PH UR: 5 [PH] (ref 5–8)
PROT UR STRIP-MCNC: NEGATIVE MG/DL
RBC # UR STRIP: ABNORMAL /UL
SP GR UR: 1.01 (ref 1–1.03)
UROBILINOGEN UR QL: NORMAL

## 2020-06-01 PROCEDURE — 81002 URINALYSIS NONAUTO W/O SCOPE: CPT | Performed by: OBSTETRICS & GYNECOLOGY

## 2020-06-01 PROCEDURE — 99213 OFFICE O/P EST LOW 20 MIN: CPT | Performed by: OBSTETRICS & GYNECOLOGY

## 2020-06-01 NOTE — PROGRESS NOTES
"      Suly Ware is a 69 y.o. patient who presents for follow up of   Chief Complaint   Patient presents with   • Follow-up     6 month PAP         69-year-old established patient here for 6-month repeat Pap.  She had a Pap at her annual in September 2019 that was LGSIL.  It was done as it was not covered by her insurance.  She underwent colposcopic biopsies in November and they were normal.  Her exam is very limited with only 70% of the transformation zone seen.  This is here for 6-month repeat Pap.  We did discuss that her Pap is worsened or fails to resolve after her next Pap smear, she will need CKC.  CKC risks, benefits and alternatives were discussed with the patient today all of her questions were answered.    The following portions of the patient's history were reviewed and updated as appropriate: allergies, current medications and problem list.    Review of Systems   Constitutional: Positive for activity change (quarantine). Negative for appetite change, fatigue, fever and unexpected weight change.   Eyes: Negative for photophobia and visual disturbance.   Respiratory: Negative for cough and shortness of breath.    Cardiovascular: Negative for chest pain and palpitations.   Gastrointestinal: Negative for abdominal distention, abdominal pain, constipation, diarrhea and nausea.   Endocrine: Negative for cold intolerance and heat intolerance.   Genitourinary: Negative for dyspareunia, dysuria, menstrual problem, pelvic pain, vaginal bleeding and vaginal discharge.   Musculoskeletal: Negative for back pain.   Skin: Negative for color change and rash.   Neurological: Negative for headaches.   Hematological: Negative for adenopathy. Does not bruise/bleed easily.   Psychiatric/Behavioral: Negative for dysphoric mood. The patient is not nervous/anxious.        /80   Ht 158.8 cm (62.52\")   Wt 82.4 kg (181 lb 11.2 oz)   LMP  (LMP Unknown)   Breastfeeding No   BMI 32.68 kg/m²     Physical Exam "   Constitutional: She is oriented to person, place, and time. She appears well-developed and well-nourished.   HENT:   Head: Normocephalic and atraumatic.   Eyes: Conjunctivae are normal. No scleral icterus.   Abdominal: Soft. She exhibits no distension and no mass. There is no tenderness. There is no rebound and no guarding. No hernia.   Genitourinary: Vagina normal and uterus normal. Pelvic exam was performed with patient supine. There is no rash, tenderness, lesion or injury on the right labia. There is no rash, tenderness, lesion or injury on the left labia. Cervix exhibits no motion tenderness, no discharge and no friability. Right adnexum displays no mass, no tenderness and no fullness. Left adnexum displays no mass, no tenderness and no fullness.   Neurological: She is alert and oriented to person, place, and time.   Skin: Skin is warm and dry.   Psychiatric: She has a normal mood and affect. Her behavior is normal. Judgment and thought content normal.   Nursing note and vitals reviewed.      A/P:  1. H/O LGSIL pap 9/2019- first 6 month repeat pap done today. Will call with results and f/u. [facility] d/w pt in detail in case she needs it, we will be able to schedule over the phone.   2. MMG due 7/2020.   3. I saw the patient with a face mask, gloves and a face shield.  The patient herself was masked.  Social distancing was observed as appropriate.    Assessment/Plan   Suly was seen today for follow-up.    Diagnoses and all orders for this visit:    Pap smear, low-risk  -     Pap IG (Image Guided)    Screening for condition  -     POC Urinalysis Dipstick    Routine gynecological examination  -     Pap IG (Image Guided)    LGSIL on Pap smear of cervix                 No follow-ups on file.      Rosa Beltre MD    6/1/2020  13:55

## 2020-06-03 LAB
CYTOLOGIST CVX/VAG CYTO: NORMAL
CYTOLOGY CVX/VAG DOC CYTO: NORMAL
CYTOLOGY CVX/VAG DOC THIN PREP: NORMAL
DX ICD CODE: NORMAL
HIV 1 & 2 AB SER-IMP: NORMAL
OTHER STN SPEC: NORMAL
STAT OF ADQ CVX/VAG CYTO-IMP: NORMAL

## 2020-07-24 ENCOUNTER — HOSPITAL ENCOUNTER (OUTPATIENT)
Dept: MAMMOGRAPHY | Facility: HOSPITAL | Age: 70
Discharge: HOME | End: 2020-07-24
Admitting: OBSTETRICS & GYNECOLOGY

## 2020-07-24 DIAGNOSIS — R92.8 ABNORMAL MAMMOGRAM: Primary | ICD-10-CM

## 2020-07-24 DIAGNOSIS — Z13.9 SCREENING FOR CONDITION: ICD-10-CM

## 2020-07-24 DIAGNOSIS — Z12.31 ENCOUNTER FOR SCREENING MAMMOGRAM FOR MALIGNANT NEOPLASM OF BREAST: ICD-10-CM

## 2020-07-24 PROCEDURE — 77067 SCR MAMMO BI INCL CAD: CPT

## 2020-07-24 PROCEDURE — 77063 BREAST TOMOSYNTHESIS BI: CPT

## 2020-08-07 ENCOUNTER — HOSPITAL ENCOUNTER (OUTPATIENT)
Dept: MAMMOGRAPHY | Facility: HOSPITAL | Age: 70
Discharge: HOME | End: 2020-08-07
Admitting: OBSTETRICS & GYNECOLOGY

## 2020-08-07 ENCOUNTER — HOSPITAL ENCOUNTER (OUTPATIENT)
Dept: ULTRASOUND IMAGING | Facility: HOSPITAL | Age: 70
End: 2020-08-07

## 2020-08-07 DIAGNOSIS — R92.8 ABNORMAL MAMMOGRAM: ICD-10-CM

## 2020-08-07 PROCEDURE — 77065 DX MAMMO INCL CAD UNI: CPT

## 2020-10-19 ENCOUNTER — TELEMEDICINE (OUTPATIENT)
Dept: FAMILY MEDICINE CLINIC | Facility: CLINIC | Age: 70
End: 2020-10-19

## 2020-10-19 DIAGNOSIS — L71.9 ROSACEA: Primary | ICD-10-CM

## 2020-10-19 PROCEDURE — 99213 OFFICE O/P EST LOW 20 MIN: CPT | Performed by: FAMILY MEDICINE

## 2020-10-19 RX ORDER — METRONIDAZOLE 10 MG/G
GEL TOPICAL DAILY
Qty: 1 TUBE | Refills: 2 | Status: SHIPPED | OUTPATIENT
Start: 2020-10-19

## 2020-10-19 NOTE — PROGRESS NOTES
Subjective   Suly Ware is a 70 y.o. female.     No chief complaint on file.      History of Present Illness Suly Ware is a 70-year-old female patient, video encounter scheduled with patient secondary to redness and swelling of her nose and cheeks for 3 days.  Patient with history of rosacea .  Denies any other associated symptoms with the rash.  Denies any triggers.    Past Medical History:   Diagnosis Date   • Acute serous otitis media    • Acute sinusitis    • Allergic     Pollen, sulfites   • Anxiety ura2000   • Arthralgia    • Colon polyp    • Depression    • DVT of lower extremity (deep venous thrombosis) (CMS/HCC)    • LGSIL on Pap smear of cervix 2019   • Migraine headache    • Osteopenia    • PFO (patent foramen ovale)    • Profound fatigue     when walking upstairs   • Pulmonary embolus (CMS/HCC)     from OCPs, thrombophilia w/u neg   • Pulmonary embolus (CMS/HCC) 2000    from OCPs, thrombophilia w/u neg   • Shoulder pain        Past Surgical History:   Procedure Laterality Date   • APPENDECTOMY     •  SECTION      x 2   • COLONOSCOPY     • COLONOSCOPY N/A 2019    Procedure: COLONOSCOPY INTO CECUM & TERMINAL ILEUM  WITH COLD SNARE POLYPECTOMY;  Surgeon: Brandon Nielsen MD;  Location: [facility] ENDOSCOPY;  Service: Gastroenterology   • DILATATION AND CURETTAGE     • EXPLORATORY LAPAROTOMY      dermoid removed   • HX OVARIAN CYSTECTOMY     • TUBAL ABDOMINAL LIGATION         Family History   Problem Relation Age of Onset   • Stroke Mother    • Hyperlipidemia Mother    • Heart disease Mother    • Heart attack Mother    • Diabetes Mother    • Osteoporosis Mother    • Arthritis Mother    • Asthma Mother    • Depression Mother    • Hearing loss Mother    • Miscarriages / Stillbirths Mother    • Stroke Father    • Aortic aneurysm Father    • Deep vein thrombosis Father    • Stroke Brother    • Hyperlipidemia Brother    • Heart disease Brother    • Breast  cancer Maternal Aunt    • No Known Problems Daughter    • No Known Problems Daughter    • Ovarian cancer Neg Hx    • Colon cancer Neg Hx    • Colon polyps Neg Hx        Social History     Socioeconomic History   • Marital status:      Spouse name: Not on file   • Number of children: Not on file   • Years of education: Not on file   • Highest education level: Not on file   Tobacco Use   • Smoking status: Former Smoker     Start date:      Quit date:      Years since quittin.8   • Smokeless tobacco: Never Used   Substance and Sexual Activity   • Alcohol use: Yes     Alcohol/week: 2.0 standard drinks     Types: 1 Cans of beer, 1 Shots of liquor per week     Comment: social drinker   • Drug use: No   • Sexual activity: Never     Partners: Male     Birth control/protection: Surgical, Post-menopausal     Comment: BTL       The following portions of the patient's history were reviewed and updated as appropriate: allergies, current medications, past family history, past medical history, past social history, past surgical history and problem list.    Review of Systems   Constitutional: Negative for activity change, appetite change, fatigue, fever, unexpected weight gain and unexpected weight loss.   HENT: Negative for congestion, ear pain, postnasal drip, rhinorrhea, sinus pressure and sore throat.    Eyes: Negative for blurred vision, discharge, itching and visual disturbance.   Respiratory: Negative for cough, chest tightness, shortness of breath and wheezing.    Cardiovascular: Negative for chest pain, palpitations and leg swelling.   Gastrointestinal: Negative for abdominal pain, constipation, diarrhea, nausea and vomiting.   Musculoskeletal: Negative for arthralgias.   Skin: Positive for rash.   Neurological: Negative for headache.   Psychiatric/Behavioral: Negative for agitation, suicidal ideas, depressed mood and stress.           Objective   There were no vitals filed for this visit.  There is no  height or weight on file to calculate BMI.  Physical Exam  Constitutional:       Appearance: Normal appearance.   Skin:     Findings: Rash present. Rash is nodular.   Neurological:      Mental Status: She is alert.         Assessment/Plan   Problem List Items Addressed This Visit        Musculoskeletal and Integument    Rosacea - Primary    Relevant Medications    metroNIDAZOLE (Metrogel) 1 % gel        Suly Ware is a 70-year old female patient video visit is scheduled with patient secondary to exacerbation of her chronic rosacea.  I will start her metronidazole gel.  He will come back or call us if her symptoms does not get better.        Return if symptoms worsen or fail to improve.

## 2020-12-14 ENCOUNTER — TELEMEDICINE (OUTPATIENT)
Dept: FAMILY MEDICINE CLINIC | Facility: TELEHEALTH | Age: 70
End: 2020-12-14

## 2020-12-14 DIAGNOSIS — R39.89 SUSPECTED UTI: Primary | ICD-10-CM

## 2020-12-14 PROCEDURE — 99213 OFFICE O/P EST LOW 20 MIN: CPT | Performed by: NURSE PRACTITIONER

## 2020-12-14 RX ORDER — PHENAZOPYRIDINE HYDROCHLORIDE 200 MG/1
200 TABLET, FILM COATED ORAL 3 TIMES DAILY PRN
Qty: 6 TABLET | Refills: 0 | Status: SHIPPED | OUTPATIENT
Start: 2020-12-14 | End: 2020-12-16

## 2020-12-14 RX ORDER — SULFAMETHOXAZOLE AND TRIMETHOPRIM 800; 160 MG/1; MG/1
1 TABLET ORAL 2 TIMES DAILY
Qty: 14 TABLET | Refills: 0 | Status: SHIPPED | OUTPATIENT
Start: 2020-12-14 | End: 2020-12-21

## 2020-12-14 NOTE — PATIENT INSTRUCTIONS
Urinary Tract Infection, Adult  A urinary tract infection (UTI) is an infection of any part of the urinary tract. The urinary tract includes:  · The kidneys.  · The ureters.  · The bladder.  · The urethra.  These organs make, store, and get rid of pee (urine) in the body.  What are the causes?  This is caused by germs (bacteria) in your genital area. These germs grow and cause swelling (inflammation) of your urinary tract.  What increases the risk?  You are more likely to develop this condition if:  · You have a small, thin tube (catheter) to drain pee.  · You cannot control when you pee or poop (incontinence).  · You are female, and:  ? You use these methods to prevent pregnancy:  § A medicine that kills sperm (spermicide).  § A device that blocks sperm (diaphragm).  ? You have low levels of a female hormone (estrogen).  ? You are pregnant.  · You have genes that add to your risk.  · You are sexually active.  · You take antibiotic medicines.  · You have trouble peeing because of:  ? A prostate that is bigger than normal, if you are male.  ? A blockage in the part of your body that drains pee from the bladder (urethra).  ? A kidney stone.  ? A nerve condition that affects your bladder (neurogenic bladder).  ? Not getting enough to drink.  ? Not peeing often enough.  · You have other conditions, such as:  ? Diabetes.  ? A weak disease-fighting system (immune system).  ? Sickle cell disease.  ? Gout.  ? Injury of the spine.  What are the signs or symptoms?  Symptoms of this condition include:  · Needing to pee right away (urgently).  · Peeing often.  · Peeing small amounts often.  · Pain or burning when peeing.  · Blood in the pee.  · Pee that smells bad or not like normal.  · Trouble peeing.  · Pee that is cloudy.  · Fluid coming from the vagina, if you are female.  · Pain in the belly or lower back.  Other symptoms include:  · Throwing up (vomiting).  · No urge to eat.  · Feeling mixed up (confused).  · Being tired  and grouchy (irritable).  · A fever.  · Watery poop (diarrhea).  How is this treated?  This condition may be treated with:  · Antibiotic medicine.  · Other medicines.  · Drinking enough water.  Follow these instructions at home:    Medicines  · Take over-the-counter and prescription medicines only as told by your doctor.  · If you were prescribed an antibiotic medicine, take it as told by your doctor. Do not stop taking it even if you start to feel better.  General instructions  · Make sure you:  ? Pee until your bladder is empty.  ? Do not hold pee for a long time.  ? Empty your bladder after sex.  ? Wipe from front to back after pooping if you are a female. Use each tissue one time when you wipe.  · Drink enough fluid to keep your pee pale yellow.  · Keep all follow-up visits as told by your doctor. This is important.  Contact a doctor if:  · You do not get better after 1-2 days.  · Your symptoms go away and then come back.  Get help right away if:  · You have very bad back pain.  · You have very bad pain in your lower belly.  · You have a fever.  · You are sick to your stomach (nauseous).  · You are throwing up.  Summary  · A urinary tract infection (UTI) is an infection of any part of the urinary tract.  · This condition is caused by germs in your genital area.  · There are many risk factors for a UTI. These include having a small, thin tube to drain pee and not being able to control when you pee or poop.  · Treatment includes antibiotic medicines for germs.  · Drink enough fluid to keep your pee pale yellow.  This information is not intended to replace advice given to you by your health care provider. Make sure you discuss any questions you have with your health care provider.  Document Revised: 12/05/2019 Document Reviewed: 06/27/2019  [employer] Patient Education © 2020 [employer]  Sulfamethoxazole; Trimethoprim, SMX-TMP tablets  What is this medicine?  SULFAMETHOXAZOLE; TRIMETHOPRIM or SMX-TMP (patti michelle  meth OK ash zohl; trye METH oh prim) is a combination of a sulfonamide antibiotic and a second antibiotic, trimethoprim. It is used to treat or prevent certain kinds of bacterial infections. It will not work for colds, flu, or other viral infections.  This medicine may be used for other purposes; ask your health care provider or pharmacist if you have questions.  COMMON BRAND NAME(S): Bacter-Aid DS, Bactrim, Bactrim DS, Septra, Septra DS  What should I tell my health care provider before I take this medicine?  They need to know if you have any of these conditions:  · G6PD deficiency  · HIV or AIDS  · kidney disease  · liver disease  · low platelets  · low red blood cell counts  · poor nutrition  · stomach or intestine problems like colitis  · thyroid disease  · an unusual or allergic reaction to sulfamethoxazole, trimethoprim, sulfa drugs, other drugs, foods, dyes, or preservatives  · pregnant or trying to get pregnant  · breast-feeding  How should I use this medicine?  Take this medicine by mouth with a glass of water. Follow the directions on the prescription label. Take your medicine at regular intervals. Do not take it more often than directed. Take all of your medicine as directed even if you think you are better. Do not skip doses or stop your medicine early.  Talk to your pediatrician regarding the use of this medicine in children. While this drug may be prescribed for children as young as 2 months for selected conditions, precautions do apply.  Overdosage: If you think you have taken too much of this medicine contact a poison control center or emergency room at once.  NOTE: This medicine is only for you. Do not share this medicine with others.  What if I miss a dose?  If you miss a dose, take it as soon as you can. If it is almost time for your next dose, take only that dose. Do not take double or extra doses.  What may interact with this medicine?  Do not take this medicine with any of the following  medications:  · dofetilide  This medicine may also interact with the following medications:  · amantadine  · birth control pills  · certain medicines for blood pressure, heart disease  · certain medicines for depression, like amitriptyline  · certain medicines for diabetes, like glipizide or glyburide  · certain medicines that treat or prevent blood clots like warfarin  · cyclosporine  · digoxin  · diuretics  · indomethacin  · methotrexate  · phenytoin  · procainamide  · pyrimethamine  · zidovudine  This list may not describe all possible interactions. Give your health care provider a list of all the medicines, herbs, non-prescription drugs, or dietary supplements you use. Also tell them if you smoke, drink alcohol, or use illegal drugs. Some items may interact with your medicine.  What should I watch for while using this medicine?  Tell your health care provider if your symptoms do not start to get better or if they get worse.  Do not treat diarrhea with over the counter products. Contact your health care provider if you have diarrhea that lasts more than 2 days or if it is severe and watery.  This drug may cause serious skin reactions. They can happen weeks to months after starting the drug. Contact your health care provider right away if you notice fevers or flu-like symptoms with a rash. The rash may be red or purple and then turn into blisters or peeling of the skin. Or, you might notice a red rash with swelling of the face, lips or lymph nodes in your neck or under your arms.  This drug can make you more sensitive to the sun. Keep out of the sun. If you cannot avoid being in the sun, wear protective clothing and sunscreen. Do not use sun lamps or tanning beds/booths.  Be careful brushing or flossing your teeth or using a toothpick because you may get an infection or bleed more easily. If you have any dental work done, tell your dentist you are receiving this drug.  What side effects may I notice from receiving  this medicine?  Side effects that you should report to your doctor or health care professional as soon as possible:  · allergic reactions (skin rash, itching or hives; swelling of the face, lips, or tongue)  · bloody or watery diarrhea  · heartbeat rhythm changes (trouble breathing; chest pain; dizziness; fast, irregular heartbeat; feeling faint or lightheaded, falls,)  · fever  · high potassium levels (chest pain; fast, irregular heartbeat; muscle weakness)  · kidney injury (trouble passing urine or change in the amount of urine)  · low blood sugar (feeling anxious; confusion; dizziness; increased hunger; unusually weak or tired; increased sweating; shakiness; cold, clammy skin; irritable; headache; blurred vision; fast heartbeat; loss of consciousness)  · low red blood cell counts (trouble breathing; feeling faint; lightheaded, falls; unusually weak or tired)  · rash, fever, and swollen lymph nodes  · redness, blistering, peeling, or loosening of the skin, including inside the mouth  · unusual bruising or bleeding  Side effects that usually do not require medical attention (report to your doctor or health care professional if they continue or are bothersome):  · loss of appetite  · nausea  · vomiting  This list may not describe all possible side effects. Call your doctor for medical advice about side effects. You may report side effects to FDA at 1-461-FDA-9952.  Where should I keep my medicine?  Keep out of the reach of children.  Store between 15 and 25 degrees C (59 to 77 degrees F). Protect from light. Keep the container tightly closed. Throw away any unused drug after the expiration date.  NOTE: This sheet is a summary. It may not cover all possible information. If you have questions about this medicine, talk to your doctor, pharmacist, or health care provider.  © 2020 [employer] Standard (2020-09-04 12:53:51)  Phenazopyridine tablets  What is this medicine?  PHENAZOPYRIDINE (fen az oh RICHARD hogue) is a  pain reliever. It is used to stop the pain, burning, or discomfort caused by infection or irritation of the urinary tract. This medicine is not an antibiotic. It will not cure a urinary tract infection.  This medicine may be used for other purposes; ask your health care provider or pharmacist if you have questions.  COMMON BRAND NAME(S): AZO, Azo-100, Azo-Gesic, Azo-Septic, Azo-Standard, Phenazo, Prodium, Pyridium, Urinary Analgesic, Uristat, Uristat Ultra  What should I tell my health care provider before I take this medicine?  They need to know if you have any of these conditions:  · glucose-6-phosphate dehydrogenase (G6PD) deficiency  · kidney disease  · an unusual or allergic reaction to phenazopyridine, other medicines, foods, dyes, or preservatives  · pregnant or trying to get pregnant  · breast-feeding  How should I use this medicine?  Take this medicine by mouth with a glass of water. Follow the directions on the prescription label. Take after meals. Take your doses at regular intervals. Do not take your medicine more often than directed. Do not skip doses or stop your medicine early even if you feel better. Do not stop taking except on your doctor's advice.  Talk to your pediatrician regarding the use of this medicine in children. Special care may be needed.  Overdosage: If you think you have taken too much of this medicine contact a poison control center or emergency room at once.  NOTE: This medicine is only for you. Do not share this medicine with others.  What if I miss a dose?  If you miss a dose, take it as soon as you can. If it is almost time for your next dose, take only that dose. Do not take double or extra doses.  What may interact with this medicine?  Interactions are not expected.  This list may not describe all possible interactions. Give your health care provider a list of all the medicines, herbs, non-prescription drugs, or dietary supplements you use. Also tell them if you smoke, drink  alcohol, or use illegal drugs. Some items may interact with your medicine.  What should I watch for while using this medicine?  Tell your doctor or health care professional if your symptoms do not improve or if they get worse.  This medicine colors body fluids red. This effect is harmless and will go away after you are done taking the medicine. It will change urine to an dark orange or red color. The red color may stain clothing. Soft contact lenses may become permanently stained. It is best not to wear soft contact lenses while taking this medicine.  If you are diabetic you may get a false positive result for sugar in your urine. Talk to your health care provider.  What side effects may I notice from receiving this medicine?  Side effects that you should report to your doctor or health care professional as soon as possible:  · allergic reactions like skin rash, itching or hives, swelling of the face, lips, or tongue  · blue or purple color of the skin  · difficulty breathing  · fever  · less urine  · unusual bleeding, bruising  · unusual tired, weak  · vomiting  · yellowing of the eyes or skin  Side effects that usually do not require medical attention (report to your doctor or health care professional if they continue or are bothersome):  · dark urine  · headache  · stomach upset  This list may not describe all possible side effects. Call your doctor for medical advice about side effects. You may report side effects to FDA at 5-786-BIL-3312.  Where should I keep my medicine?  Keep out of the reach of children.  Store at room temperature between 15 and 30 degrees C (59 and 86 degrees F). Protect from light and moisture. Throw away any unused medicine after the expiration date.  NOTE: This sheet is a summary. It may not cover all possible information. If you have questions about this medicine, talk to your doctor, pharmacist, or health care provider.  © 2020 [employer]/Gold Standard (2009-07-16 11:04:07)

## 2020-12-14 NOTE — PROGRESS NOTES
CHIEF COMPLAINT  Chief Complaint   Patient presents with   • Urinary Tract Infection         HPI  Suly Ware is a 70 y.o. female  presents with complaint of pain and burning with urination, bladder fullness, and urgency that started yesterday. Patient states she has a history of UTIs; her last one was a year ago she believes. Denies fever, back or flank pain, history of kidney stones, or hematuria. She does report lower belly pain with her symptoms.    Review of Systems   Constitutional: Positive for chills. Negative for activity change, appetite change, fatigue and fever.   HENT: Negative.    Respiratory: Negative for cough and shortness of breath.    Cardiovascular: Negative for chest pain.   Gastrointestinal: Positive for abdominal pain. Negative for diarrhea, nausea and vomiting.   Genitourinary: Positive for dysuria, frequency and urgency. Negative for decreased urine volume, enuresis, flank pain, hematuria, pelvic pain, vaginal bleeding, vaginal discharge and vaginal pain.   Musculoskeletal: Negative for myalgias.   Skin: Negative for rash.   Neurological: Negative for headaches.   All other systems reviewed and are negative.      Past Medical History:   Diagnosis Date   • Acute serous otitis media    • Acute sinusitis    • Allergic     Pollen, sulfites   • Anxiety Januray 2000   • Arthralgia    • Colon polyp    • Depression    • DVT of lower extremity (deep venous thrombosis) (CMS/HCC)    • LGSIL on Pap smear of cervix 11/27/2019   • Migraine headache    • Osteopenia    • PFO (patent foramen ovale)    • Profound fatigue     when walking upstairs   • Pulmonary embolus (CMS/HCC) 2000    from OCPs, thrombophilia w/u neg   • Pulmonary embolus (CMS/HCC) 1/1/2000    from OCPs, thrombophilia w/u neg   • Shoulder pain        Family History   Problem Relation Age of Onset   • Stroke Mother    • Hyperlipidemia Mother    • Heart disease Mother    • Heart attack Mother    • Diabetes Mother    • Osteoporosis  Mother    • Arthritis Mother    • Asthma Mother    • Depression Mother    • Hearing loss Mother    • Miscarriages / Stillbirths Mother    • Stroke Father    • Aortic aneurysm Father    • Deep vein thrombosis Father    • Stroke Brother    • Hyperlipidemia Brother    • Heart disease Brother    • Breast cancer Maternal Aunt    • No Known Problems Daughter    • No Known Problems Daughter    • Ovarian cancer Neg Hx    • Colon cancer Neg Hx    • Colon polyps Neg Hx        Social History     Socioeconomic History   • Marital status:      Spouse name: Not on file   • Number of children: Not on file   • Years of education: Not on file   • Highest education level: Not on file   Tobacco Use   • Smoking status: Former Smoker     Start date:      Quit date:      Years since quittin.9   • Smokeless tobacco: Never Used   Substance and Sexual Activity   • Alcohol use: Yes     Alcohol/week: 2.0 standard drinks     Types: 1 Cans of beer, 1 Shots of liquor per week     Comment: social drinker   • Drug use: No   • Sexual activity: Never     Partners: Male     Birth control/protection: Surgical, Post-menopausal     Comment: BTL         LMP  (LMP Unknown)   Breastfeeding No     PHYSICAL EXAM  Physical Exam   Constitutional: She is oriented to person, place, and time. She appears well-developed and well-nourished. She does not have a sickly appearance. She does not appear ill. No distress.   HENT:   Head: Normocephalic and atraumatic.   Right Ear: External ear normal.   Left Ear: External ear normal.   Nose: Nose normal.   Eyes: Conjunctivae and EOM are normal.   Pulmonary/Chest: Effort normal.  No respiratory distress.  Abdominal: There is abdominal tenderness (per pt report) in the suprapubic area.   Neurological: She is alert and oriented to person, place, and time.   Skin: Skin is dry.   Psychiatric: She has a normal mood and affect.           Diagnoses and all orders for this visit:    1. Suspected UTI  (Primary)  -     sulfamethoxazole-trimethoprim (BACTRIM DS,SEPTRA DS) 800-160 MG per tablet; Take 1 tablet by mouth 2 (Two) Times a Day for 7 days.  Dispense: 14 tablet; Refill: 0  -     phenazopyridine (Pyridium) 200 MG tablet; Take 1 tablet by mouth 3 (Three) Times a Day As Needed for Bladder Spasms for up to 2 days.  Dispense: 6 tablet; Refill: 0    Plan of Care:  -Complete entire course of medication even if you begin to feel better.   -Increase your fluid intake; avoid bladder irritants such as caffeine and alcohol  -Abstain from intercourse during antibiotic treatment.   -Practice good perineal hygiene: wipe front to back; showers preferred over tub baths   -Do not hold your urine- go to the bathroom every 2-3 hours.  Follow up in person with your primary care provider for no improvement in 2-3 days; go to the nearest urgent care center or ER for new or worsening symptoms. Patient verbalized understanding.      FOLLOW-UP  As discussed during visit with PCP if no improvement or Urgent Care/Emergency Department if worsening of symptoms    Patient verbalizes understanding of medication dosage, comfort measures, instructions for treatment and follow-up.    SIMONA Flores  12/14/2020  07:03 EST    This visit was performed via Telehealth.  This patient has been instructed to follow-up with their primary care provider if their symptoms worsen or the treatment provided does not resolve their illness.

## 2021-02-27 ENCOUNTER — IMMUNIZATION (OUTPATIENT)
Dept: VACCINE CLINIC | Facility: HOSPITAL | Age: 71
End: 2021-02-27

## 2021-02-27 PROCEDURE — 91300 HC SARSCOV02 VAC 30MCG/0.3ML IM: CPT | Performed by: INTERNAL MEDICINE

## 2021-02-27 PROCEDURE — 0001A: CPT | Performed by: INTERNAL MEDICINE

## 2021-03-20 ENCOUNTER — IMMUNIZATION (OUTPATIENT)
Dept: VACCINE CLINIC | Facility: HOSPITAL | Age: 71
End: 2021-03-20

## 2021-03-20 PROCEDURE — 0002A: CPT | Performed by: INTERNAL MEDICINE

## 2021-03-20 PROCEDURE — 91300 HC SARSCOV02 VAC 30MCG/0.3ML IM: CPT | Performed by: INTERNAL MEDICINE

## 2021-05-21 ENCOUNTER — OFFICE VISIT (OUTPATIENT)
Dept: FAMILY MEDICINE CLINIC | Facility: CLINIC | Age: 71
End: 2021-05-21

## 2021-05-21 ENCOUNTER — TELEPHONE (OUTPATIENT)
Dept: FAMILY MEDICINE CLINIC | Facility: CLINIC | Age: 71
End: 2021-05-21

## 2021-05-21 VITALS
WEIGHT: 176.5 LBS | DIASTOLIC BLOOD PRESSURE: 84 MMHG | HEIGHT: 63 IN | OXYGEN SATURATION: 98 % | SYSTOLIC BLOOD PRESSURE: 122 MMHG | TEMPERATURE: 96 F | HEART RATE: 63 BPM | BODY MASS INDEX: 31.27 KG/M2

## 2021-05-21 DIAGNOSIS — R30.0 DYSURIA: Primary | ICD-10-CM

## 2021-05-21 LAB
BILIRUB BLD-MCNC: NEGATIVE MG/DL
CLARITY, POC: CLEAR
COLOR UR: ABNORMAL
GLUCOSE UR STRIP-MCNC: NEGATIVE MG/DL
KETONES UR QL: NEGATIVE
LEUKOCYTE EST, POC: NEGATIVE
NITRITE UR-MCNC: NEGATIVE MG/ML
PH UR: 7 [PH] (ref 5–8)
PROT UR STRIP-MCNC: NEGATIVE MG/DL
RBC # UR STRIP: ABNORMAL /UL
SP GR UR: 1 (ref 1–1.03)
UROBILINOGEN UR QL: NORMAL

## 2021-05-21 PROCEDURE — 81003 URINALYSIS AUTO W/O SCOPE: CPT | Performed by: FAMILY MEDICINE

## 2021-05-21 PROCEDURE — 99213 OFFICE O/P EST LOW 20 MIN: CPT | Performed by: FAMILY MEDICINE

## 2021-05-21 RX ORDER — PHENAZOPYRIDINE HYDROCHLORIDE 200 MG/1
200 TABLET, FILM COATED ORAL 3 TIMES DAILY PRN
Qty: 6 TABLET | Refills: 0 | Status: SHIPPED | OUTPATIENT
Start: 2021-05-21 | End: 2021-07-01

## 2021-05-21 RX ORDER — SULFAMETHOXAZOLE AND TRIMETHOPRIM 800; 160 MG/1; MG/1
1 TABLET ORAL 2 TIMES DAILY
Qty: 10 TABLET | Refills: 0 | Status: SHIPPED | OUTPATIENT
Start: 2021-05-21 | End: 2021-05-21 | Stop reason: SINTOL

## 2021-05-21 RX ORDER — NITROFURANTOIN 25; 75 MG/1; MG/1
100 CAPSULE ORAL 2 TIMES DAILY
Qty: 10 CAPSULE | Refills: 0 | Status: SHIPPED | OUTPATIENT
Start: 2021-05-21 | End: 2021-07-01

## 2021-05-21 NOTE — TELEPHONE ENCOUNTER
PATIENT CALLING IN REGARDS TO LET DR RAMIRES KNOW THAT SHE HAD A SLIGHT REACTION TO SULFAMETHOXZALE. PATIENT STATES LIP WAS SWOLLEN AND 3 HIVES ONE ON HEAD. SHOULDER AND BACK BUT THE SWELLING HAS SUBSIDED. PATIENT RIGHT EYE WAS ITCHY BUT THAT ALSO SUBSIDED. PATIENT SAYS THAT SHE IS REQUESTING SOMETHING ELSE TO BE ON . WARM TRANSFERRED CALL TO SARAH TO HAVE PT SPEAK  TO NURSE IN OFFICE.  PLEASE ADVISE, THANK YOU!

## 2021-05-21 NOTE — PROGRESS NOTES
"Chief Complaint  Urinary Tract Infection (c/o of s/s x 2 days), Chills, Headache, and Nausea    Subjective          Suly Ware presents to [facility]  History of Present Illness with complaint of increased urinary frequency, dysuria and nausea for 2 days.. Denies any fever or vomiting.  Has any abdominal pain.      Objective   Vital Signs:   /84   Pulse 63   Temp 96 °F (35.6 °C)   Ht 158.8 cm (62.5\")   Wt 80.1 kg (176 lb 8 oz)   SpO2 98%   BMI 31.77 kg/m²     Physical Exam  Constitutional:       General: She is not in acute distress.     Appearance: Normal appearance. She is well-developed.   HENT:      Head: Normocephalic and atraumatic.      Right Ear: Tympanic membrane normal.      Left Ear: Tympanic membrane normal.      Mouth/Throat:      Mouth: Mucous membranes are moist.   Eyes:      General:         Right eye: No discharge.         Left eye: No discharge.      Extraocular Movements: Extraocular movements intact.      Pupils: Pupils are equal, round, and reactive to light.   Cardiovascular:      Rate and Rhythm: Normal rate and regular rhythm.      Heart sounds: Normal heart sounds.   Pulmonary:      Effort: Pulmonary effort is normal.      Breath sounds: Normal breath sounds. No wheezing or rales.   Abdominal:      General: Bowel sounds are normal.      Palpations: Abdomen is soft. There is no mass.      Tenderness: There is abdominal tenderness in the suprapubic area. There is no guarding or rebound.   Musculoskeletal:      Cervical back: Normal range of motion and neck supple.      Right lower leg: No edema.      Left lower leg: No edema.   Lymphadenopathy:      Cervical: No cervical adenopathy.   Neurological:      General: No focal deficit present.      Mental Status: She is alert and oriented to person, place, and time.        Result Review :                 Assessment and Plan    Diagnoses and all orders for this visit:    1. Dysuria (Primary)  -     " POC Urinalysis Dipstick, Automated  -     Urine Culture - Urine, Urine, Clean Catch    Other orders  -     Discontinue: sulfamethoxazole-trimethoprim (Bactrim DS) 800-160 MG per tablet; Take 1 tablet by mouth 2 (Two) Times a Day.  Dispense: 10 tablet; Refill: 0  -     phenazopyridine (Pyridium) 200 MG tablet; Take 1 tablet by mouth 3 (Three) Times a Day As Needed for Bladder Spasms.  Dispense: 6 tablet; Refill: 0  -     nitrofurantoin, macrocrystal-monohydrate, (Macrobid) 100 MG capsule; Take 1 capsule by mouth 2 (Two) Times a Day.  Dispense: 10 capsule; Refill: 0        Suly Ware is a 70-year-old female patient came here for 2 days history of increased urinary frequency dysuria.  Urine dip done in the office positive for blood only with I will send it for culture and sensitivity.  Because patient is symptomatic I will start her on antibiotic.  Addendum, patient called back and had a reaction with Bactrim, swelling of lips with hives.  I advised not to take Bactrim anymore.  Take Benadryl.  If she is experiencing any chest pain or shortness of breath she will go to the emergency room.  She will start Macrobid from tomorrow now.    Follow Up   No follow-ups on file.  Patient was given instructions and counseling regarding her condition or for health maintenance advice. Please see specific information pulled into the AVS if appropriate.

## 2021-05-23 LAB
BACTERIA UR CULT: NO GROWTH
BACTERIA UR CULT: NORMAL

## 2021-06-11 ENCOUNTER — TRANSCRIBE ORDERS (OUTPATIENT)
Dept: ADMINISTRATIVE | Facility: HOSPITAL | Age: 71
End: 2021-06-11

## 2021-06-11 DIAGNOSIS — Z12.31 ENCOUNTER FOR SCREENING MAMMOGRAM FOR BREAST CANCER: Primary | ICD-10-CM

## 2021-07-01 ENCOUNTER — HOSPITAL ENCOUNTER (OUTPATIENT)
Dept: GENERAL RADIOLOGY | Facility: HOSPITAL | Age: 71
Discharge: HOME | End: 2021-07-01

## 2021-07-01 ENCOUNTER — OFFICE VISIT (OUTPATIENT)
Dept: FAMILY MEDICINE CLINIC | Facility: CLINIC | Age: 71
End: 2021-07-01

## 2021-07-01 ENCOUNTER — LAB (OUTPATIENT)
Dept: LAB | Facility: HOSPITAL | Age: 71
End: 2021-07-01

## 2021-07-01 VITALS
BODY MASS INDEX: 31.2 KG/M2 | OXYGEN SATURATION: 97 % | SYSTOLIC BLOOD PRESSURE: 112 MMHG | HEART RATE: 65 BPM | DIASTOLIC BLOOD PRESSURE: 76 MMHG | TEMPERATURE: 96.4 F | WEIGHT: 176.1 LBS | HEIGHT: 63 IN

## 2021-07-01 DIAGNOSIS — Z78.0 POSTMENOPAUSAL: ICD-10-CM

## 2021-07-01 DIAGNOSIS — Z13.220 LIPID SCREENING: ICD-10-CM

## 2021-07-01 DIAGNOSIS — Z11.59 ENCOUNTER FOR HEPATITIS C SCREENING TEST FOR LOW RISK PATIENT: Primary | ICD-10-CM

## 2021-07-01 DIAGNOSIS — R73.9 HYPERGLYCEMIA: Primary | ICD-10-CM

## 2021-07-01 DIAGNOSIS — R53.82 CHRONIC FATIGUE: ICD-10-CM

## 2021-07-01 DIAGNOSIS — D17.9 LIPOMA, UNSPECIFIED SITE: ICD-10-CM

## 2021-07-01 DIAGNOSIS — M54.9 UPPER BACK PAIN: ICD-10-CM

## 2021-07-01 LAB
ALBUMIN SERPL-MCNC: 4 G/DL (ref 3.5–5.2)
ALBUMIN/GLOB SERPL: 1.7 G/DL
ALP SERPL-CCNC: 69 U/L (ref 39–117)
ALT SERPL W P-5'-P-CCNC: 17 U/L (ref 1–33)
ANION GAP SERPL CALCULATED.3IONS-SCNC: 10.4 MMOL/L (ref 5–15)
AST SERPL-CCNC: 21 U/L (ref 1–32)
BASOPHILS # BLD AUTO: 0.05 10*3/MM3 (ref 0–0.2)
BASOPHILS NFR BLD AUTO: 0.9 % (ref 0–1.5)
BILIRUB SERPL-MCNC: 0.3 MG/DL (ref 0–1.2)
BUN SERPL-MCNC: 10 MG/DL (ref 8–23)
BUN/CREAT SERPL: 11.2 (ref 7–25)
CALCIUM SPEC-SCNC: 9.4 MG/DL (ref 8.6–10.5)
CHLORIDE SERPL-SCNC: 107 MMOL/L (ref 98–107)
CHOLEST SERPL-MCNC: 225 MG/DL (ref 0–200)
CO2 SERPL-SCNC: 24.6 MMOL/L (ref 22–29)
CREAT SERPL-MCNC: 0.89 MG/DL (ref 0.57–1)
DEPRECATED RDW RBC AUTO: 47.2 FL (ref 37–54)
EOSINOPHIL # BLD AUTO: 0.07 10*3/MM3 (ref 0–0.4)
EOSINOPHIL NFR BLD AUTO: 1.3 % (ref 0.3–6.2)
ERYTHROCYTE [DISTWIDTH] IN BLOOD BY AUTOMATED COUNT: 13.4 % (ref 12.3–15.4)
GFR SERPL CREATININE-BSD FRML MDRD: 63 ML/MIN/1.73
GLOBULIN UR ELPH-MCNC: 2.4 GM/DL
GLUCOSE SERPL-MCNC: 109 MG/DL (ref 65–99)
HBA1C MFR BLD: 5.51 % (ref 4.8–5.6)
HCT VFR BLD AUTO: 38.6 % (ref 34–46.6)
HDLC SERPL-MCNC: 75 MG/DL (ref 40–60)
HGB BLD-MCNC: 12.4 G/DL (ref 12–15.9)
IMM GRANULOCYTES # BLD AUTO: 0.02 10*3/MM3 (ref 0–0.05)
IMM GRANULOCYTES NFR BLD AUTO: 0.4 % (ref 0–0.5)
LDLC SERPL CALC-MCNC: 138 MG/DL (ref 0–100)
LDLC/HDLC SERPL: 1.81 {RATIO}
LYMPHOCYTES # BLD AUTO: 1.47 10*3/MM3 (ref 0.7–3.1)
LYMPHOCYTES NFR BLD AUTO: 26.5 % (ref 19.6–45.3)
MCH RBC QN AUTO: 30.6 PG (ref 26.6–33)
MCHC RBC AUTO-ENTMCNC: 32.1 G/DL (ref 31.5–35.7)
MCV RBC AUTO: 95.3 FL (ref 79–97)
MONOCYTES # BLD AUTO: 0.37 10*3/MM3 (ref 0.1–0.9)
MONOCYTES NFR BLD AUTO: 6.7 % (ref 5–12)
NEUTROPHILS NFR BLD AUTO: 3.57 10*3/MM3 (ref 1.7–7)
NEUTROPHILS NFR BLD AUTO: 64.2 % (ref 42.7–76)
NRBC BLD AUTO-RTO: 0 /100 WBC (ref 0–0.2)
PLATELET # BLD AUTO: 276 10*3/MM3 (ref 140–450)
PMV BLD AUTO: 8.6 FL (ref 6–12)
POTASSIUM SERPL-SCNC: 4.3 MMOL/L (ref 3.5–5.2)
PROT SERPL-MCNC: 6.4 G/DL (ref 6–8.5)
RBC # BLD AUTO: 4.05 10*6/MM3 (ref 3.77–5.28)
SODIUM SERPL-SCNC: 142 MMOL/L (ref 136–145)
T4 FREE SERPL-MCNC: 1.09 NG/DL (ref 0.93–1.7)
TRIGL SERPL-MCNC: 70 MG/DL (ref 0–150)
TSH SERPL DL<=0.05 MIU/L-ACNC: 2.03 UIU/ML (ref 0.27–4.2)
VLDLC SERPL-MCNC: 12 MG/DL (ref 5–40)
WBC # BLD AUTO: 5.55 10*3/MM3 (ref 3.4–10.8)

## 2021-07-01 PROCEDURE — 84443 ASSAY THYROID STIM HORMONE: CPT | Performed by: FAMILY MEDICINE

## 2021-07-01 PROCEDURE — G0439 PPPS, SUBSEQ VISIT: HCPCS | Performed by: FAMILY MEDICINE

## 2021-07-01 PROCEDURE — 72072 X-RAY EXAM THORAC SPINE 3VWS: CPT

## 2021-07-01 PROCEDURE — 36415 COLL VENOUS BLD VENIPUNCTURE: CPT | Performed by: FAMILY MEDICINE

## 2021-07-01 PROCEDURE — 80061 LIPID PANEL: CPT | Performed by: FAMILY MEDICINE

## 2021-07-01 PROCEDURE — 85025 COMPLETE CBC W/AUTO DIFF WBC: CPT | Performed by: FAMILY MEDICINE

## 2021-07-01 PROCEDURE — 80053 COMPREHEN METABOLIC PANEL: CPT | Performed by: FAMILY MEDICINE

## 2021-07-01 PROCEDURE — 84439 ASSAY OF FREE THYROXINE: CPT | Performed by: FAMILY MEDICINE

## 2021-07-01 PROCEDURE — 83036 HEMOGLOBIN GLYCOSYLATED A1C: CPT | Performed by: FAMILY MEDICINE

## 2021-07-01 RX ORDER — ASPIRIN 81 MG/1
81 TABLET ORAL DAILY
COMMUNITY
End: 2022-08-04

## 2021-07-01 NOTE — PROGRESS NOTES
The ABCs of the Annual Wellness Visit  Subsequent Medicare Wellness Visit    Chief Complaint   Patient presents with   • Medicare Wellness-subsequent     AWV       Subjective   History of Present Illness:  Suly Ware is a 70 y.o. female who presents for a Subsequent Medicare Wellness Visit.  She is also complaining of upper back pain.  Denies any injury to back.  She is having this pain for few years but getting worse.  Denies any radiation to pain.    HEALTH RISK ASSESSMENT    Recent Hospitalizations:  No hospitalization(s) within the last year.    Current Medical Providers:  Patient Care Team:  Roberta Vazquez MD as PCP - General (Family Medicine)    Smoking Status:  Social History     Tobacco Use   Smoking Status Former Smoker   • Packs/day: 0.00   • Years: 0.00   • Pack years: 0.00   • Start date:    • Quit date:    • Years since quittin.5   Smokeless Tobacco Never Used       Alcohol Consumption:  Social History     Substance and Sexual Activity   Alcohol Use Yes   • Alcohol/week: 2.0 standard drinks   • Types: 1 Cans of beer, 1 Shots of liquor per week    Comment: social drinker       Depression Screen:   PHQ-2/PHQ-9 Depression Screening 2021   Little interest or pleasure in doing things 0   Feeling down, depressed, or hopeless 0   Trouble falling or staying asleep, or sleeping too much 0   Feeling tired or having little energy 0   Poor appetite or overeating 0   Feeling bad about yourself - or that you are a failure or have let yourself or your family down 0   Trouble concentrating on things, such as reading the newspaper or watching television 0   Moving or speaking so slowly that other people could have noticed. Or the opposite - being so fidgety or restless that you have been moving around a lot more than usual 0   Thoughts that you would be better off dead, or of hurting yourself in some way 0   Total Score 0   If you checked off any problems, how difficult have these problems made it  for you to do your work, take care of things at home, or get along with other people? Not difficult at all       Fall Risk Screen:  LESLYE Fall Risk Assessment was completed, and patient is at MODERATE risk for falls. Assessment completed on:7/1/2021    Health Habits and Functional and Cognitive Screening:  Functional & Cognitive Status 7/1/2021   Do you have difficulty preparing food and eating? No   Do you have difficulty bathing yourself, getting dressed or grooming yourself? No   Do you have difficulty using the toilet? No   Do you have difficulty moving around from place to place? No   Do you have trouble with steps or getting out of a bed or a chair? No   Current Diet Well Balanced Diet   Dental Exam Not up to date   Eye Exam Up to date   Exercise (times per week) 4 times per week   Current Exercises Include Walking   Current Exercise Activities Include -   Do you need help using the phone?  No   Are you deaf or do you have serious difficulty hearing?  No   Do you need help with transportation? No   Do you need help shopping? No   Do you need help preparing meals?  No   Do you need help with housework?  No   Do you need help with laundry? No   Do you need help taking your medications? No   Do you need help managing money? No   Do you ever drive or ride in a car without wearing a seat belt? No   Have you felt unusual stress, anger or loneliness in the last month? No   Who do you live with? Spouse   If you need help, do you have trouble finding someone available to you? No   Have you been bothered in the last four weeks by sexual problems? No   Do you have difficulty concentrating, remembering or making decisions? No         Does the patient have evidence of cognitive impairment? No    Asprin use counseling:Taking ASA appropriately as indicated    Age-appropriate Screening Schedule:  Refer to the list below for future screening recommendations based on patient's age, sex and/or medical conditions. Orders for  these recommended tests are listed in the plan section. The patient has been provided with a written plan.    Health Maintenance   Topic Date Due   • INFLUENZA VACCINE  08/01/2021   • DXA SCAN  09/09/2021   • MAMMOGRAM  08/07/2022   • PAP SMEAR  06/01/2023   • TDAP/TD VACCINES (2 - Td or Tdap) 08/05/2029   • ZOSTER VACCINE  Completed          The following portions of the patient's history were reviewed and updated as appropriate: allergies, current medications, past family history, past medical history, past social history, past surgical history and problem list.    Outpatient Medications Prior to Visit   Medication Sig Dispense Refill   • aspirin 81 MG EC tablet Take 81 mg by mouth Daily.     • Cyanocobalamin (VITAMIN B-12 PO) Take 1 tablet by mouth Daily.     • Magnesium 100 MG capsule Take  by mouth.     • metroNIDAZOLE (Metrogel) 1 % gel Apply  topically to the appropriate area as directed Daily. 1 tube 2   • VITAMIN D PO Take 1 tablet by mouth Daily.     • nitrofurantoin, macrocrystal-monohydrate, (Macrobid) 100 MG capsule Take 1 capsule by mouth 2 (Two) Times a Day. 10 capsule 0   • phenazopyridine (Pyridium) 200 MG tablet Take 1 tablet by mouth 3 (Three) Times a Day As Needed for Bladder Spasms. 6 tablet 0     No facility-administered medications prior to visit.       Patient Active Problem List   Diagnosis   • Headache, migraine   • DVT (deep venous thrombosis) (CMS/HCC)   • Chronic right shoulder pain   • Influenza   • Chronic fatigue   • Family history of breast cancer- paternal aunt age 39, pt is BRCA neg.   • Rosacea   • Osteopenia   • Depression   • Screen for colon cancer   • LGSIL on Pap smear of cervix   • Recurrent vaginitis   • Benign essential microscopic hematuria       Advanced Care Planning:  ACP discussion was held with the patient during this visit. Patient does not have an advance directive, information provided.    Review of Systems    Compared to one year ago, the patient feels her  "physical health is worse.  Compared to one year ago, the patient feels her mental health is better.    Reviewed chart for potential of high risk medication in the elderly: not applicable  Reviewed chart for potential of harmful drug interactions in the elderly:not applicable    Objective         Vitals:    07/01/21 0842   BP: 112/76   Pulse: 65   Temp: 96.4 °F (35.8 °C)   SpO2: 97%   Weight: 79.9 kg (176 lb 1.6 oz)   Height: 158.8 cm (62.5\")       Body mass index is 31.7 kg/m².  Discussed the patient's BMI with her. The BMI is in the acceptable range.    Physical Exam  Constitutional:       Appearance: Normal appearance. She is well-developed.   HENT:      Head: Normocephalic and atraumatic.      Right Ear: Tympanic membrane, ear canal and external ear normal.      Left Ear: Tympanic membrane, ear canal and external ear normal.      Nose: Nose normal.      Mouth/Throat:      Mouth: Mucous membranes are moist.   Eyes:      General: No scleral icterus.     Extraocular Movements: Extraocular movements intact.      Conjunctiva/sclera: Conjunctivae normal.      Pupils: Pupils are equal, round, and reactive to light.   Neck:      Thyroid: No thyromegaly.   Cardiovascular:      Rate and Rhythm: Normal rate and regular rhythm.      Pulses: Normal pulses.      Heart sounds: Normal heart sounds.   Pulmonary:      Effort: Pulmonary effort is normal. No respiratory distress.      Breath sounds: Normal breath sounds. No wheezing or rales.   Abdominal:      General: Bowel sounds are normal. There is no distension.      Palpations: Abdomen is soft. There is no mass.      Tenderness: There is no abdominal tenderness.      Hernia: No hernia is present.   Musculoskeletal:         General: Tenderness present. No swelling or deformity. Normal range of motion.      Cervical back: Normal range of motion and neck supple.      Thoracic back: Tenderness and bony tenderness present.      Comments: Between shoulder blades   Lymphadenopathy: "      Cervical: No cervical adenopathy.   Skin:     General: Skin is warm.   Neurological:      General: No focal deficit present.      Mental Status: She is alert and oriented to person, place, and time.      Cranial Nerves: No cranial nerve deficit.      Sensory: No sensory deficit.      Deep Tendon Reflexes: Reflexes normal.   Psychiatric:         Mood and Affect: Mood normal.         Behavior: Behavior normal.         Thought Content: Thought content normal.         Judgment: Judgment normal.         Lab Results   Component Value Date    TRIG 70 07/01/2021    HDL 75 (H) 07/01/2021     (H) 07/01/2021    VLDL 12 07/01/2021    HGBA1C 5.51 07/01/2021        Assessment/Plan   Medicare Risks and Personalized Health Plan  CMS Preventative Services Quick Reference  Advance Directive Discussion  Breast Cancer/Mammogram Screening  Colon Cancer Screening  Dementia/Memory   Immunizations Discussed/Encouraged (specific immunizations; Pneumococcal 23 )    The above risks/problems have been discussed with the patient.  Pertinent information has been shared with the patient in the After Visit Summary.  Follow up plans and orders are seen below in the Assessment/Plan Section.    Diagnoses and all orders for this visit:    1. Hyperglycemia (Primary)  -     Hemoglobin A1c    2. Lipid screening  -     Lipid Panel    3. Chronic fatigue  -     CBC & Differential  -     Comprehensive Metabolic Panel  -     TSH+Free T4    4. Postmenopausal  -     DEXA Bone Density Axial; Future    5. Upper back pain  -     Cancel: XR Spine Thoracic 3 View (In Office)  -     XR Spine Thoracic 3 View (In Office)    6. Lipoma, unspecified site  -     Ambulatory Referral to General Surgery        Suly Ware  Is a 70-year-old female patient came here for  Medicare annual wellness visit, preventive screening discussed with patient I will schedule her for DEXA scan and mammogram.  She is up-to-date with colonoscopy.  She will need pneumonia  23.  Patient thinks she has got the pneumonia 23 and will check with her pharmacy.  We will also check fasting lipids and sugar today.  She was also seen here today for    Upper back pain, she is having this pain for years but has gotten worse over few months.  I will check x-ray thoracic spine today.  We will refer her to physical therapy.  Lipoma, patient with multiple lipoma.  We will refer her to surgery.    Follow Up:  No follow-ups on file.     An After Visit Summary and PPPS were given to the patient.

## 2021-07-13 ENCOUNTER — TELEPHONE (OUTPATIENT)
Dept: FAMILY MEDICINE CLINIC | Facility: CLINIC | Age: 71
End: 2021-07-13

## 2021-07-13 ENCOUNTER — OFFICE VISIT (OUTPATIENT)
Dept: SURGERY | Facility: CLINIC | Age: 71
End: 2021-07-13

## 2021-07-13 VITALS — WEIGHT: 174.2 LBS | BODY MASS INDEX: 30.87 KG/M2 | HEIGHT: 63 IN

## 2021-07-13 DIAGNOSIS — D49.2 SOFT TISSUE NEOPLASM: Primary | ICD-10-CM

## 2021-07-13 PROCEDURE — 99202 OFFICE O/P NEW SF 15 MIN: CPT | Performed by: SURGERY

## 2021-07-13 RX ORDER — PHENOL 1.4 %
600 AEROSOL, SPRAY (ML) MUCOUS MEMBRANE DAILY
COMMUNITY

## 2021-07-13 NOTE — PROGRESS NOTES
Subjective   Suly Ware is a 70 y.o. female who presents to the office in surgical consultation from Roberta Vazquez MD for soft tissue neoplasm adjacent to the left knee.    History of Present Illness     The patient has multiple lipomas and has had them for more than 30 years.  They involve both upper extremities and lower extremities.  There are 3 located around her left knee that she believes is causing pain in the knee.  She has some pain with ambulation and some pain when she kneels on the knee.  There is 1 soft tissue neoplasm that seems to be slowly getting larger and she was concerned it may be growing into the knee joint itself.    Review of Systems   Constitutional: Negative for fatigue and fever.   Respiratory: Negative for chest tightness and shortness of breath.    Cardiovascular: Negative for chest pain and palpitations.   Gastrointestinal: Negative for abdominal pain, blood in stool, constipation, diarrhea, nausea and vomiting.     Past Medical History:   Diagnosis Date   • Acute serous otitis media    • Acute sinusitis    • Allergic     Pollen, sulfites   • Anxiety Januray    • Arthralgia    • Colon polyp    • Depression    • DVT of lower extremity (deep venous thrombosis) (CMS/HCC)    • LGSIL on Pap smear of cervix 2019   • Migraine headache    • Osteopenia    • PFO (patent foramen ovale)    • Profound fatigue     when walking upstairs   • Pulmonary embolus (CMS/HCC)     from OCPs, thrombophilia w/u neg   • Pulmonary embolus (CMS/HCC) 2000    from OCPs, thrombophilia w/u neg   • Shoulder pain      Past Surgical History:   Procedure Laterality Date   • APPENDECTOMY     •  SECTION      x 2   • COLONOSCOPY     • COLONOSCOPY N/A 2019    Procedure: COLONOSCOPY INTO CECUM & TERMINAL ILEUM  WITH COLD SNARE POLYPECTOMY;  Surgeon: Brandon Nielsen MD;  Location: [facility] ENDOSCOPY;  Service: Gastroenterology   • DILATATION AND CURETTAGE     • EXPLORATORY  LAPAROTOMY      dermoid removed   • HX OVARIAN CYSTECTOMY     • TUBAL ABDOMINAL LIGATION       Family History   Problem Relation Age of Onset   • Stroke Mother    • Hyperlipidemia Mother    • Heart disease Mother    • Heart attack Mother    • Diabetes Mother    • Osteoporosis Mother    • Arthritis Mother    • Asthma Mother    • Depression Mother    • Hearing loss Mother    • Miscarriages / Stillbirths Mother    • Stroke Father    • Aortic aneurysm Father    • Deep vein thrombosis Father    • Stroke Brother    • Hyperlipidemia Brother    • Heart disease Brother    • Breast cancer Maternal Aunt    • No Known Problems Daughter    • No Known Problems Daughter    • Ovarian cancer Neg Hx    • Colon cancer Neg Hx    • Colon polyps Neg Hx      Social History     Socioeconomic History   • Marital status:      Spouse name: Not on file   • Number of children: Not on file   • Years of education: Not on file   • Highest education level: Not on file   Tobacco Use   • Smoking status: Former Smoker     Packs/day: 0.00     Years: 0.00     Pack years: 0.00     Start date:      Quit date:      Years since quittin.5   • Smokeless tobacco: Never Used   Vaping Use   • Vaping Use: Never used   Substance and Sexual Activity   • Alcohol use: Yes     Alcohol/week: 2.0 standard drinks     Types: 1 Cans of beer, 1 Shots of liquor per week     Comment: social drinker   • Drug use: No   • Sexual activity: Never     Partners: Male     Birth control/protection: Surgical, Post-menopausal     Comment: BTL       Objective   Physical Exam  Constitutional:       Appearance: Normal appearance. She is well-developed. She is not toxic-appearing.   Eyes:      General: No scleral icterus.  Pulmonary:      Effort: Pulmonary effort is normal. No respiratory distress.   Skin:     General: Skin is warm and dry.      Comments: There are multiple soft tissue neoplasms involving the upper extremities.  There are soft tissue neoplasms involving  the lower extremities including 3 separate lesions around the left knee.  There are 2 small lesions, less than 2 cm in size and one larger lesion that is at least 4 cm in size.  All are mobile with well-defined margins.   Neurological:      Mental Status: She is alert and oriented to person, place, and time.   Psychiatric:         Behavior: Behavior normal.         Thought Content: Thought content normal.         Judgment: Judgment normal.         Assessment/Plan       The encounter diagnosis was Soft tissue neoplasm.    The patient has multiple soft tissue neoplasms involving her upper extremities and her lower extremities.  There are 3 separate lesions around the knee that are all consistent with lipomas.  They have benign characteristics on palpation and should all be located in the subcutaneous tissue.  This was discussed with the patient.  They should not cause specific knee pain and they will not grow into the knee joint itself.  If she is having pain with ambulation, that is more likely to be related to arthritis and is not expected to improve with excision of the soft tissue neoplasms.  She will follow the soft tissue neoplasms for now and return to the office if they get larger.  She will consider seeing an orthopedic surgeon for her knee pain.

## 2021-07-13 NOTE — TELEPHONE ENCOUNTER
----- Message from Roberta Vazquez MD sent at 7/11/2021  5:57 PM EDT -----  Call pt I will send her for physical therapy ddd disease of thoracic spine also she can mobic/ ibuprofen /advil prn also start taking turmeric capsule

## 2021-07-13 NOTE — TELEPHONE ENCOUNTER
Hub please inform patient if call back:    LVM informing patient of ref being placed to PT for her degenerative Disc Disease. She also recommend taking Ibuprofen, Advil, or she can send in Meloxicam which are NSAIDS to help as needed and to start a tumeric supplement she can get otc

## 2021-07-21 ENCOUNTER — TELEPHONE (OUTPATIENT)
Dept: FAMILY MEDICINE CLINIC | Facility: CLINIC | Age: 71
End: 2021-07-21

## 2021-07-21 NOTE — TELEPHONE ENCOUNTER
----- Message from Roberta Vazquez MD sent at 7/21/2021 10:33 AM EDT -----  Treatment for degenerative arthritis usually is physical therapy, and nonsteroidal anti-inflammatory as needed for pain.  She does not need a B12 injections.

## 2021-07-21 NOTE — TELEPHONE ENCOUNTER
[facility]    Hub please inform patient if call back:   Dr. Vazquez has answered her question about treatment for DDD. She says the following:    Treatment for degenerative arthritis usually is physical therapy, and nonsteroidal anti-inflammatory as needed for pain.    If she would like the referral to PT just let us know. Sorry for the delay in answering this.

## 2021-07-23 DIAGNOSIS — M54.50 ACUTE BILATERAL LOW BACK PAIN WITHOUT SCIATICA: Primary | ICD-10-CM

## 2021-07-23 NOTE — TELEPHONE ENCOUNTER
Caller: Suly Ware    Relationship: Self    Best call back number: [phone]    What was the call regarding: PATIENT RETURNED [facility]LE[facility] CALL, HUB RELAYED MESSAGE, PLEASE CALL PATIENT TO GET PT SCHEDULED.     Do you require a callback: YES

## 2021-08-16 ENCOUNTER — HOSPITAL ENCOUNTER (OUTPATIENT)
Dept: PHYSICAL THERAPY | Facility: HOSPITAL | Age: 71
Setting detail: THERAPIES SERIES
Discharge: HOME | End: 2021-08-16

## 2021-08-16 DIAGNOSIS — M54.50 LOW BACK PAIN, UNSPECIFIED BACK PAIN LATERALITY, UNSPECIFIED CHRONICITY, UNSPECIFIED WHETHER SCIATICA PRESENT: Primary | ICD-10-CM

## 2021-08-16 DIAGNOSIS — M54.2 CERVICALGIA: ICD-10-CM

## 2021-08-16 PROCEDURE — 97162 PT EVAL MOD COMPLEX 30 MIN: CPT | Performed by: PHYSICAL THERAPIST

## 2021-08-16 PROCEDURE — 97110 THERAPEUTIC EXERCISES: CPT | Performed by: PHYSICAL THERAPIST

## 2021-08-16 NOTE — THERAPY EVALUATION
Outpatient Physical Therapy Ortho Initial Evaluation  [facility]     Patient Name: Suly Ware  : 1950  MRN: [id number]  Today's Date: 2021      Visit Date: 2021    Patient Active Problem List   Diagnosis   • Headache, migraine   • DVT (deep venous thrombosis) (CMS/HCC)   • Chronic right shoulder pain   • Influenza   • Chronic fatigue   • Family history of breast cancer- paternal aunt age 39, pt is BRCA neg.   • Rosacea   • Osteopenia   • Depression   • Screen for colon cancer   • LGSIL on Pap smear of cervix   • Recurrent vaginitis   • Benign essential microscopic hematuria        Past Medical History:   Diagnosis Date   • Acute serous otitis media    • Acute sinusitis    • Allergic     Pollen, sulfites   • Anxiety 2000   • Arthralgia    • Colon polyp    • Depression    • DVT of lower extremity (deep venous thrombosis) (CMS/HCC)    • LGSIL on Pap smear of cervix 2019   • Migraine headache    • Osteopenia    • PFO (patent foramen ovale)    • Profound fatigue     when walking upstairs   • Pulmonary embolus (CMS/HCC)     from OCPs, thrombophilia w/u neg   • Pulmonary embolus (CMS/HCC) 2000    from OCPs, thrombophilia w/u neg   • Shoulder pain         Past Surgical History:   Procedure Laterality Date   • APPENDECTOMY     •  SECTION      x 2   • COLONOSCOPY     • COLONOSCOPY N/A 2019    Procedure: COLONOSCOPY INTO CECUM & TERMINAL ILEUM  WITH COLD SNARE POLYPECTOMY;  Surgeon: Brandon Nielsen MD;  Location: [facility] ENDOSCOPY;  Service: Gastroenterology   • DILATATION AND CURETTAGE     • EXPLORATORY LAPAROTOMY      dermoid removed   • HX OVARIAN CYSTECTOMY     • TUBAL ABDOMINAL LIGATION         Visit Dx:     ICD-10-CM ICD-9-CM   1. Low back pain, unspecified back pain laterality, unspecified chronicity, unspecified whether sciatica present  M54.5 724.2   2. Cervicalgia  M54.2 723.1         Patient History     Row Name 21 0900 21  0741          History    Chief Complaint  Joint stiffness;Muscle weakness;Pain;Tightness;Tinglings  -GJ  Joint stiffness;Muscle weakness;Pain;Tightness;Tinglings  (Pended)   (Patient-Rptd)   -patient     Type of Pain  Shoulder pain;Back pain;Neck pain  -GJ  Back pain  (Pended)   (Patient-Rptd)   -patient     Date Current Problem(s) Began  -- exaterbated 6-9months ago  -GJ  --     Brief Description of Current Complaint  Ms. Ware is a 69 y/o R handed female. She reports intermittently chronic mid T spine pain, worsening over the past 6 months.  She also reports c spine, bilateral shoulder, pain with bilateral hand N/T.  Of note, she reports fall off porch about 6 weeks ago. She reports hobbies of knitting, reading, which has become difficult and painful. Bilateral hands tend to fall asleep at night and in morning.  She has trouble sleeping, pain /difficulty with watching TV, playing with phone/I pad, knitting, dishes, vacuuming.  She reads in bed.  She reports having osteopenia.  Plain films of T spine demonstrate degenerative changes. No treatment for this condition.    -GJ  Pain in middle of back. Recent pain neck/shoulder with numbness and tingling in hads  (Pended)   (Patient-Rptd)   -patient     Previous treatment for THIS PROBLEM  -- nothing recently  -GJ  --     Patient/Caregiver Goals  Relieve pain;Return to prior level of function;Improve mobility;Know what to do to help the symptoms  -GJ  Relieve pain;Return to prior level of function;Improve mobility;Know what to do to help the symptoms  (Pended)   (Patient-Rptd)   -patient     Hand Dominance  right-handed  -GJ  right-handed  (Pended)   (Patient-Rptd)   -patient     Occupation/sports/leisure activities  Walking, crafts, knitting  -GJ  Walking, crafts  (Pended)   (Patient-Rptd)   -patient     Patient seeing anyone else for problem(s)?  --  No  (Pended)   (Patient-Rptd)   -patient     What clinical tests have you had for this problem?  X-ray  -  X-ray   (Pended)   (Patient-Rptd)   -patient     Are you or can you be pregnant  No  -GJ  No  (Pended)   (Patient-Rptd)   -patient        Pain     What Performance Factors Make the Current Problem(s) WORSE?  watching tv, knitting, dishes, vacuuming, grouting/  -GJ  --     What Performance Factors Make the Current Problem(s) BETTER?  stretches,   -GJ  --        Fall Risk Assessment    Any falls in the past year:  Yes  -GJ  Yes  (Pended)   (Patient-Rptd)   -patient     Number of falls reported in the last 12 months  1  -GJ  --     Factors that contributed to the fall:  Lost balance  -GJ  Lost balance  (Pended)   (Patient-Rptd)   -patient        Services    Prior Rehab/Home Health Experiences  --  Yes  (Pended)   (Patient-Rptd)   -patient     Are you currently receiving Home Health services  --  No  (Pended)   (Patient-Rptd)   -patient     Do you plan to receive Home Health services in the near future  --  No  (Pended)   (Patient-Rptd)   -patient        Daily Activities    Primary Language  English  -GJ  English  (Pended)   (Patient-Rptd)   -patient     Are you able to read  Yes  -GJ  Yes  (Pended)   (Patient-Rptd)   -patient     Are you able to write  Yes  -GJ  Yes  (Pended)   (Patient-Rptd)   -patient     How does patient learn best?  Reading;Demonstration  -GJ  Reading;Demonstration  (Pended)   (Patient-Rptd)   -patient     Teaching needs identified  Home Exercise Program;Management of Condition  -GJ  --     Patient is concerned about/has problems with  Bed Mobility;Performing home management (household chores, shopping, care of dependents);Performing job responsibilities/community activities (work, school,;Performing sports, recreation, and play activities;Reaching over head;Repetitive movements of the hand, arm, shoulder;Sitting;Standing;Writing/grasping items with hand(s)  -GJ  --     Barriers to learning  None  -GJ  --     Pt Participated in POC and Goals  Yes  -GJ  --        Safety    Are you being hurt, hit, or  frightened by anyone at home or in your life?  No  -GJ  No  (Pended)   (Patient-Rptd)   -patient     Are you being neglected by a caregiver  No  -GJ  No  (Pended)   (Patient-Rptd)   -patient     Have you had any of the following issues with  --  Depression  (Pended)   (Patient-Rptd)   -patient       User Key  (r) = Recorded By, (t) = Taken By, (c) = Cosigned By    Initials Name Provider Type    GJ Gianni Carrasco, PT Physical Therapist    patient Suly Ware --          PT Ortho     Row Name 08/16/21 0900       Posture/Observations    Alignment Options  Forward head;Rounded shoulders  -GJ    Forward Head  Mild;Moderate  -GJ    Rounded Shoulders  Bilateral:;Moderate  -GJ       Quarter Clearing    Quarter Clearing  Upper Quarter Clearing  -GJ       DTR- Upper Quarter Clearing    Biceps (C5/6)  Bilateral:;2- Normal response  -GJ    Brachioradialis (C6)  Bilateral:;2- Normal response  -GJ    Triceps (C7)  Bilateral:;2- Normal response  -GJ       Myotomal Screen- Upper Quarter Clearing    Shoulder flexion (C5)  Bilateral:;4+ (Good +)  -GJ    Elbow flexion/wrist extension (C6)  Bilateral:;4+ (Good +)  -GJ    Elbow extension/wrist flexion (C7)  Bilateral:;4+ (Good +)  -GJ    Finger flexion/ (C8)  Bilateral:;4+ (Good +)  -GJ    Finger abduction (T1)  Bilateral:;4+ (Good +)  -GJ       Cervical/Shoulder ROM Screen    Cervical flexion  Normal  -GJ    Cervical extension  Normal  -GJ    Cervical lateral flexion  Normal  -GJ    Cervical rotation  Normal  -GJ       Special Tests/Palpation    Special Tests/Palpation  Cervical/Thoracic;Shoulder;Wrist/Hand  -GJ       Cervical Palpation    Cervical Palpation- Location?  Suboccipital;Levator scapula;Upper traps  -GJ    Suboccipital  Bilateral:;Tender  -GJ    Levator Scapula  Bilateral:;Tender;Guarded/taut  -GJ    Upper Traps  Bilateral:;Tender;Guarded/taut;Trigger point  -GJ       Cervical Accessory Motions    Cervical Accessory Motions Tested?  Yes  -GJ    PA glide- C3   Hypomobile  -GJ    PA glide- C4  Hypomobile  -GJ    PA glide- C5  Hypomobile  -GJ    PA glide- C6  Hypomobile  -GJ    PA glide- C7  Hypomobile  -GJ       Thoracic Accessory Motions    Thoracic Accessory Motions Tested?  Yes  -GJ    Pa glide- Upper thoracic  Hypomobile  -GJ    Pa glide- Middle thoracic  Hypomobile  -GJ    Pa glide- Lower thoracic  Hypomobile  -GJ       Cervical/Thoracic Special Tests    Spurlings (Foraminal Compression)  Bilateral:;Negative  -GJ    Cervical Compression (Forarminal Compression vs. Facet Pain)  Negative  -GJ    Sharp-Tomi (AA instability)  Negative  -GJ       Shoulder Impingement/Rotator Cuff Special Tests    Albert-Mihir Test (RC Lesion vs. Bursitis)  Right:;Positive  -GJ    Full Can Test (RC Lesion)  Right:;Negative  -GJ    Empty Can Test (RC Lesion)  Right:;Negative  -GJ       Shoulder Girdle Palpation    Shoulder Girdle Palpation?  Yes  -GJ       Wrist/Hand Special Tests    Tinel’s sign (median nerve irritation)  Bilateral:;Negative  -GJ       General ROM    GENERAL ROM COMMENTS  pt demonstrates bilateral shoulder, elbow, wrist AROM grossly WFL and equal in all planes  -GJ       MMT (Manual Muscle Testing)    Rt Upper Ext  Rt Shoulder Internal Rotation;Rt Shoulder External Rotation  -GJ    Lt Upper Ext  Lt Shoulder Internal Rotation;Lt Shoulder External Rotation  -GJ       MMT Right Upper Ext    Rt Shoulder Internal Rotation MMT, Gross Movement  (4+/5) good plus  -GJ    Rt Shoulder External Rotation MMT, Gross Movement  (4+/5) good plus  -GJ       MMT Left Upper Ext    Lt Shoulder Internal Rotation MMT, Gross Movement  (4+/5) good plus  -GJ    Lt Shoulder External Rotation MMT, Gross Movement  (4+/5) good plus  -GJ       Flexibility    Flexibility Tested?  Upper Extremity;Lower Extremity  -GJ       Upper Extremity Flexibility    Suboccipitals  Bilateral:;Mildly limited;Moderately limited  -GJ    Scalenes  Bilateral:;Mildly limited;Moderately limited  -GJ    Upper Trapezius   Bilateral:;Moderately limited  -GJ    Levator Scapula  Bilateral:;Moderately limited  -GJ    Pect Minor  Bilateral:;Moderately limited  -GJ    Latissimus Dorsi  Bilateral:;Moderately limited  -GJ       Lower Extremity Flexibility    Hamstrings  Bilateral:;Moderately limited  -GJ    Hip External Rotators  Bilateral:;Moderately limited  -GJ    Hip Internal Rotators  Bilateral:;Moderately limited  -GJ      User Key  (r) = Recorded By, (t) = Taken By, (c) = Cosigned By    Initials Name Provider Type    Gianni Graves, PT Physical Therapist                      Therapy Education  Education Details: discussed dx, px, poc, discussed anatomy of the spine  and physiology of healing, discussed realistic expectations and time frames for therapy. Discussed ergononics with reading (trying to avoid readig in bed to avoid poor postural alignement), computer/phone use, knitting, dishes, vacuuming. Discussed trial of use of wrist cock up splints at night to help with possible CTS.  Discussed do's and don'ts with osteoporosis, encouraged to set timers while performing knitting activities to allow to get up and move about vs. sitting or 4-5 hours at a time.  Given: HEP, Symptoms/condition management, Pain management, Posture/body mechanics, Mobility training  Program: New  How Provided: Verbal  Provided to: Patient  Level of Understanding: Teach back education performed, Verbalized, Demonstrated     PT OP Goals     Row Name 08/16/21 0900          PT Short Term Goals    STG Date to Achieve  09/17/21  -GJ     STG 1  pt. to be I with initial HEP to facilitate self management of their condition  -GJ     STG 1 Progress  New  -GJ     STG 2  pt. to be educated in/verbalize understanding of the importance of posture/ergonomics in association with their condition to facilitate self management of their condition  -GJ     STG 2 Progress  New  -GJ     STG 3  pt to report sleeping through the night without interruption secondary to pain to  facillitate optimal and restorative rest  -GJ     STG 3 Progress  New  -GJ        Long Term Goals    LTG Date to Achieve  10/16/21  -GJ     LTG 1  pt. to be I with advanced HEP to facilitate self management of their condition  -GJ     LTG 1 Progress  New  -GJ     LTG 2  pt. to report an Oswestry score </= 22%  to demonstrate decreased level of perceived disability  -GJ     LTG 2 Progress  New  -GJ     LTG 3  pt to report >/= 50% decrease in bilateral hand N/T to faciltiate ease with performiing leisure activities such as knitting  -GJ     LTG 3 Progress  New  -GJ     LTG 4  pt to report being able to perform household activities such as dishes, vacuuming without exacerbation of mid back pain to facilitate ease of perfoorming household chores  -GJ     LTG 4 Progress  New  -GJ        Time Calculation    PT Goal Re-Cert Due Date  10/16/21  -GJ       User Key  (r) = Recorded By, (t) = Taken By, (c) = Cosigned By    Initials Name Provider Type     Gianni Carrasco, PT Physical Therapist          PT Assessment/Plan     Row Name 08/16/21 1257          PT Assessment    Functional Limitations  Limitation in home management;Limitations in community activities;Performance in leisure activities  -     Impairments  Impaired flexibility;Impaired muscle endurance;Impaired muscle length;Impaired muscle power;Impaired postural alignment;Joint mobility;Muscle strength;Pain;Poor body mechanics;Posture;Range of motion  -GJ     Assessment Comments  Ms. Ware is a 69 y/o R handed female. She reports intermittently chronic mid T spine pain, worsening over the past 6 months.  She also reports c spine, bilateral shoulder, pain with bilateral hand N/T.  Of note, she reports fall off porch about 6 weeks ago. She reports hobbies of knitting, reading, which has become difficult and painful. Bilateral hands tend to fall asleep at night and in morning.  She has trouble sleeping, pain /difficulty with watching TV, playing with phone/I pad,  knitting, dishes, vacuuming.  She reads in bed.  She reports having osteopenia.  Plain films of T spine demonstrate degenerative changes. No treatment for this condition.  Ms. Ware presents with FHP/rounded shoulder posture. She demonstrates hypomobile T/C spine PIVM, generalized weakness of posterior scapular girdle tissues, shortened anterior chest/scalene tissues.  She demonstrates near full C spine AROM without symptoms.  She reports an Oswestry score of 44%, scored 0-100, 0 represents no perceived disability. Ms. Ware demonstrates evolving s/s consistent with postural syndrome/degenerative changes of her spine which limits her participation in household, leisure, sleeping activities.    Aggravating/Personal factors affecting recovery include,  but are not limited to, poor postural awareness, chronic nature of condition, osteopenia.  Ms. Ware  may benefit from skilled physical therapy to address the above impairments  -GJ     Please refer to paper survey for additional self-reported information  Yes  -GJ     Rehab Potential  Good  -GJ     Patient/caregiver participated in establishment of treatment plan and goals  Yes  -GJ     Patient would benefit from skilled therapy intervention  Yes  -GJ        PT Plan    PT Frequency  1x/week;2x/week  -GJ     Predicted Duration of Therapy Intervention (PT)  10 visits  -GJ     Planned CPT's?  PT EVAL MOD COMPLELITY: 88052;PT RE-EVAL: 17266;PT THER PROC EA 15 MIN: 99095;PT THER ACT EA 15 MIN: 87167;PT MANUAL THERAPY EA 15 MIN: 44731;PT NEUROMUSC RE-EDUCATION EA 15 MIN: 91026;PT HOT OR COLD PACK TREAT MCARE;PT ELECTRICAL STIM UNATTEND: ;PT TRACTION CERVICAL: 12668  -GJ     PT Plan Comments  warm up on UBE, scap rows with band, shoulder ext with band, wash the wall, supine HA, piriformis stretch.  Progress scapular girdle posture as able with Y, T, I (not first session back).  -GJ       User Key  (r) = Recorded By, (t) = Taken By, (c) = Cosigned By    Initials  Name Provider Type    Gianni Graves, PT Physical Therapist            OP Exercises     Row Name 08/16/21 1609 08/16/21 0900          Total Minutes    85225 - PT Therapeutic Exercise Minutes  10  -GJ  --        Exercise 1    Exercise Name 1  --  reverse shoulder rolls  -GJ     Cueing 1  --  Verbal;Demo  -GJ     Reps 1  --  15  -GJ        Exercise 2    Exercise Name 2  --  doorway stretch  -GJ     Cueing 2  --  Verbal;Demo  -GJ     Reps 2  --  3  -GJ     Time 2  --  20 s  -GJ        Exercise 3    Exercise Name 3  --  SL trunk rotation  -GJ     Cueing 3  --  Verbal;Demo  -GJ     Reps 3  --  5  -GJ     Time 3  --  10 s  -GJ     Additional Comments  --  B  -GJ        Exercise 4    Exercise Name 4  --  LTR  -GJ     Cueing 4  --  Verbal;Demo  -GJ     Reps 4  --  10  -GJ     Time 4  --  5 s  -GJ       User Key  (r) = Recorded By, (t) = Taken By, (c) = Cosigned By    Initials Name Provider Type    Gianni Graves, PT Physical Therapist                        Outcome Measure Options: Modifed Owestry  Modified Oswestry  Modified Oswestry Score/Comments: 44%      Time Calculation:     Start Time: 0915  Stop Time: 1000  Time Calculation (min): 45 min  Timed Charges  40339 - PT Therapeutic Exercise Minutes: 10  Total Minutes  Timed Charges Total Minutes: 10   Total Minutes: 10     Therapy Charges for Today     Code Description Service Date Service Provider Modifiers Qty    14390151958 HC PT THER PROC EA 15 MIN 8/16/2021 Gianni Carrasco, PT GP 1    21897823859 HC PT EVAL MOD COMPLEXITY 2 8/16/2021 Gianni Carrasco, PT GP 1          PT G-Codes  Outcome Measure Options: Modifed Owestry  Modified Oswestry Score/Comments: 44%         Gianni Carrasco, JAMES  8/16/2021

## 2021-08-19 ENCOUNTER — HOSPITAL ENCOUNTER (OUTPATIENT)
Dept: PHYSICAL THERAPY | Facility: HOSPITAL | Age: 71
Setting detail: THERAPIES SERIES
Discharge: HOME | End: 2021-08-19

## 2021-08-19 DIAGNOSIS — M54.2 CERVICALGIA: ICD-10-CM

## 2021-08-19 DIAGNOSIS — M54.50 LOW BACK PAIN, UNSPECIFIED BACK PAIN LATERALITY, UNSPECIFIED CHRONICITY, UNSPECIFIED WHETHER SCIATICA PRESENT: Primary | ICD-10-CM

## 2021-08-19 PROCEDURE — 97110 THERAPEUTIC EXERCISES: CPT

## 2021-08-19 NOTE — THERAPY TREATMENT NOTE
Outpatient Physical Therapy Ortho Treatment Note  [facility]     Patient Name: Suly Ware  : 1950  MRN: [id number]  Today's Date: 2021      Visit Date: 2021    Visit Dx:    ICD-10-CM ICD-9-CM   1. Low back pain, unspecified back pain laterality, unspecified chronicity, unspecified whether sciatica present  M54.5 724.2   2. Cervicalgia  M54.2 723.1       Patient Active Problem List   Diagnosis   • Headache, migraine   • DVT (deep venous thrombosis) (CMS/HCC)   • Chronic right shoulder pain   • Influenza   • Chronic fatigue   • Family history of breast cancer- paternal aunt age 39, pt is BRCA neg.   • Rosacea   • Osteopenia   • Depression   • Screen for colon cancer   • LGSIL on Pap smear of cervix   • Recurrent vaginitis   • Benign essential microscopic hematuria        Past Medical History:   Diagnosis Date   • Acute serous otitis media    • Acute sinusitis    • Allergic     Pollen, sulfites   • Anxiety uray    • Arthralgia    • Colon polyp    • Depression    • DVT of lower extremity (deep venous thrombosis) (CMS/HCC)    • LGSIL on Pap smear of cervix 2019   • Migraine headache    • Osteopenia    • PFO (patent foramen ovale)    • Profound fatigue     when walking upstairs   • Pulmonary embolus (CMS/HCC)     from OCPs, thrombophilia w/u neg   • Pulmonary embolus (CMS/HCC) 2000    from OCPs, thrombophilia w/u neg   • Shoulder pain         Past Surgical History:   Procedure Laterality Date   • APPENDECTOMY     •  SECTION      x 2   • COLONOSCOPY     • COLONOSCOPY N/A 2019    Procedure: COLONOSCOPY INTO CECUM & TERMINAL ILEUM  WITH COLD SNARE POLYPECTOMY;  Surgeon: Brandon Nielsen MD;  Location: [facility] ENDOSCOPY;  Service: Gastroenterology   • DILATATION AND CURETTAGE     • EXPLORATORY LAPAROTOMY      dermoid removed   • HX OVARIAN CYSTECTOMY     • TUBAL ABDOMINAL LIGATION                         PT Assessment/Plan     Row Name 21  0900          PT Assessment    Assessment Comments  Pt returns for first follow up visit after initial eval reporting good tolerance for HEP, no pain more tightness. Reviewed current HEP, pt demonstrates good understanding, and progress therex program to include rows, shoulder ext, HA, ER, wall wall, AR press, and piriformis stretch all with good tolerance. Pt requires cues to avoid compensation with UT during shoulder ext and ER. Updated HEP to include HA, rows, and piriformis stretch, pt reports good understanding. She remains appropriate for skilled PT.  -RS        PT Plan    PT Plan Comments  Assess tolerance for previous session, consider prone scap strength, continue to focus on T/L mobility and improved motor control  -RS       User Key  (r) = Recorded By, (t) = Taken By, (c) = Cosigned By    Initials Name Provider Type    RS Isa Welch, PT Physical Therapist            OP Exercises     Row Name 08/19/21 0800             Subjective Comments    Subjective Comments  Feeling a little tight but good overall, HEp went well  -RS         Subjective Pain    Able to rate subjective pain?  yes  -RS      Subjective Pain Comment  tight  -RS         Total Minutes    54919 - PT Therapeutic Exercise Minutes  38  -RS         Exercise 1    Exercise Name 1  reverse shoulder rolls  -RS      Cueing 1  Verbal;Demo  -RS      Reps 1  15  -RS         Exercise 2    Exercise Name 2  doorway stretch  -RS      Cueing 2  Verbal;Demo  -RS      Reps 2  3  -RS      Time 2  20 s  -RS         Exercise 3    Exercise Name 3  SL trunk rotation  -RS      Cueing 3  Verbal;Demo  -RS      Reps 3  5  -RS      Time 3  10 s  -RS         Exercise 4    Exercise Name 4  LTR  -RS      Cueing 4  Verbal;Demo  -RS      Reps 4  10  -RS      Time 4  5 s  -RS         Exercise 5    Exercise Name 5  UBE  -RS      Cueing 5  Verbal  -RS      Time 5  4 min  -RS      Additional Comments  L1  -RS         Exercise 6    Exercise Name 6  pirirformis stretch  -RS       Cueing 6  Verbal  -RS      Reps 6  3  -RS      Additional Comments  20s  -RS         Exercise 7    Exercise Name 7  supine HA/ER  -RS      Cueing 7  Verbal;Demo  -RS      Sets 7  2  -RS      Reps 7  10  -RS      Additional Comments  RTB  -RS         Exercise 8    Exercise Name 8  wall wash  -RS      Cueing 8  Verbal;Demo  -RS      Reps 8  10  -RS      Time 8  5s  -RS         Exercise 9    Exercise Name 9  AR press  -RS      Cueing 9  Verbal;Demo  -RS      Sets 9  2  -RS      Reps 9  10  -RS      Time 9  RTB  -RS      Additional Comments  TrA brace  -RS         Exercise 10    Exercise Name 10  rows/Ext  -RS      Cueing 10  Verbal;Demo  -RS      Sets 10  2  -RS      Reps 10  10  -RS      Time 10  RTB  -RS        User Key  (r) = Recorded By, (t) = Taken By, (c) = Cosigned By    Initials Name Provider Type    RS Isa Welch, PT Physical Therapist                       PT OP Goals     Row Name 08/19/21 0800          PT Short Term Goals    STG Date to Achieve  09/17/21  -RS     STG 1  pt. to be I with initial HEP to facilitate self management of their condition  -RS     STG 1 Progress  Ongoing  -RS     STG 2  pt. to be educated in/verbalize understanding of the importance of posture/ergonomics in association with their condition to facilitate self management of their condition  -RS     STG 2 Progress  Ongoing  -RS     STG 3  pt to report sleeping through the night without interruption secondary to pain to facillitate optimal and restorative rest  -RS     STG 3 Progress  Ongoing  -RS        Long Term Goals    LTG Date to Achieve  10/16/21  -RS     LTG 1  pt. to be I with advanced HEP to facilitate self management of their condition  -RS     LTG 1 Progress  Ongoing  -RS     LTG 2  pt. to report an Oswestry score </= 22%  to demonstrate decreased level of perceived disability  -RS     LTG 2 Progress  Ongoing  -RS     LTG 3  pt to report >/= 50% decrease in bilateral hand N/T to faciltiate ease with performiing  leisure activities such as knitting  -RS     LTG 3 Progress  Ongoing  -RS     LTG 4  pt to report being able to perform household activities such as dishes, vacuuming without exacerbation of mid back pain to facilitate ease of perfoorming household chores  -RS     LTG 4 Progress  Ongoing  -RS       User Key  (r) = Recorded By, (t) = Taken By, (c) = Cosigned By    Initials Name Provider Type    RS Isa Welch, PT Physical Therapist          Therapy Education  Education Details: updated HEP  Given: HEP  Program: Reinforced, Progressed  How Provided: Verbal, Demonstration, Written  Provided to: Patient  Level of Understanding: Verbalized, Demonstrated              Time Calculation:   Start Time: 0802  Stop Time: 0840  Time Calculation (min): 38 min  Timed Charges  38043 - PT Therapeutic Exercise Minutes: 38  Total Minutes  Timed Charges Total Minutes: 38   Total Minutes: 38  Therapy Charges for Today     Code Description Service Date Service Provider Modifiers Qty    46961111995 HC PT THER PROC EA 15 MIN 8/19/2021 Isa Welch, PT GP 3                    Isa Welch PT  8/19/2021

## 2021-08-30 ENCOUNTER — HOSPITAL ENCOUNTER (OUTPATIENT)
Dept: PHYSICAL THERAPY | Facility: HOSPITAL | Age: 71
Setting detail: THERAPIES SERIES
Discharge: HOME | End: 2021-08-30

## 2021-08-30 DIAGNOSIS — M54.2 CERVICALGIA: ICD-10-CM

## 2021-08-30 DIAGNOSIS — M54.50 LOW BACK PAIN, UNSPECIFIED BACK PAIN LATERALITY, UNSPECIFIED CHRONICITY, UNSPECIFIED WHETHER SCIATICA PRESENT: Primary | ICD-10-CM

## 2021-08-30 PROCEDURE — 97110 THERAPEUTIC EXERCISES: CPT | Performed by: PHYSICAL THERAPIST

## 2021-08-30 PROCEDURE — 97140 MANUAL THERAPY 1/> REGIONS: CPT | Performed by: PHYSICAL THERAPIST

## 2021-08-30 NOTE — THERAPY TREATMENT NOTE
Outpatient Physical Therapy Ortho Treatment Note  [facility]     Patient Name: Suly Ware  : 1950  MRN: [id number]  Today's Date: 2021      Visit Date: 2021    Visit Dx:    ICD-10-CM ICD-9-CM   1. Low back pain, unspecified back pain laterality, unspecified chronicity, unspecified whether sciatica present  M54.5 724.2   2. Cervicalgia  M54.2 723.1       Patient Active Problem List   Diagnosis   • Headache, migraine   • DVT (deep venous thrombosis) (CMS/HCC)   • Chronic right shoulder pain   • Influenza   • Chronic fatigue   • Family history of breast cancer- paternal aunt age 39, pt is BRCA neg.   • Rosacea   • Osteopenia   • Depression   • Screen for colon cancer   • LGSIL on Pap smear of cervix   • Recurrent vaginitis   • Benign essential microscopic hematuria        Past Medical History:   Diagnosis Date   • Acute serous otitis media    • Acute sinusitis    • Allergic     Pollen, sulfites   • Anxiety uray    • Arthralgia    • Colon polyp    • Depression    • DVT of lower extremity (deep venous thrombosis) (CMS/HCC)    • LGSIL on Pap smear of cervix 2019   • Migraine headache    • Osteopenia    • PFO (patent foramen ovale)    • Profound fatigue     when walking upstairs   • Pulmonary embolus (CMS/HCC)     from OCPs, thrombophilia w/u neg   • Pulmonary embolus (CMS/HCC) 2000    from OCPs, thrombophilia w/u neg   • Shoulder pain         Past Surgical History:   Procedure Laterality Date   • APPENDECTOMY     •  SECTION      x 2   • COLONOSCOPY     • COLONOSCOPY N/A 2019    Procedure: COLONOSCOPY INTO CECUM & TERMINAL ILEUM  WITH COLD SNARE POLYPECTOMY;  Surgeon: Brandon Nielsen MD;  Location: [facility] ENDOSCOPY;  Service: Gastroenterology   • DILATATION AND CURETTAGE     • EXPLORATORY LAPAROTOMY      dermoid removed   • HX OVARIAN CYSTECTOMY     • TUBAL ABDOMINAL LIGATION                         PT Assessment/Plan     Row Name 21  0930          PT Assessment    Assessment Comments  Ms. Ware returns, reporting good response to therapy, reporting no real pain since initiating therapy. We continued to work on scpular girdle strengthening, T spine mobility. Initiated manual mobilizations of T spine and hip flexor/quad stretching. We reviewed taking breaks with prolonged positioning activiites (grouting bathroom, knitting, etc). Ms. Ware continues to be an excellent candidate for skilled physical therapy.  -GJ        PT Plan    PT Plan Comments  likely keep HEP simple, assess response to manual techniques.  ? alt arms in supine, ? prone Y, T, I (maybe over pillow to protect L spine)  -GJ       User Key  (r) = Recorded By, (t) = Taken By, (c) = Cosigned By    Initials Name Provider Type    Gianni Graves, PT Physical Therapist            OP Exercises     Row Name 08/30/21 0828 08/30/21 0800          Subjective Comments    Subjective Comments  --  I'm feeling better, Haven't had much pain since starting therapy  -GJ        Total Minutes    66809 - PT Therapeutic Exercise Minutes  30  -GJ  --     55235 - PT Manual Therapy Minutes  15  -GJ  --        Exercise 1    Exercise Name 1  --  reverse shoulder rolls  -GJ     Cueing 1  --  Verbal;Demo  -GJ     Reps 1  --  20  -GJ     Additional Comments  --  3#  -GJ        Exercise 2    Exercise Name 2  --  doorway stretch  -GJ     Cueing 2  --  Verbal;Demo  -GJ     Reps 2  --  3  -GJ     Time 2  --  20 s  -GJ        Exercise 3    Exercise Name 3  --  SL trunk rotation  -GJ     Cueing 3  --  Verbal;Demo  -GJ     Reps 3  --  5  -GJ     Time 3  --  10 s  -GJ        Exercise 4    Exercise Name 4  --  LTR  -GJ     Cueing 4  --  Verbal;Demo  -GJ     Reps 4  --  10  -GJ     Time 4  --  5 s  -GJ        Exercise 5    Exercise Name 5  --  UBE  -GJ     Cueing 5  --  Verbal  -GJ     Time 5  --  4 min  -GJ        Exercise 6    Exercise Name 6  --  pirirformis stretch  -GJ     Cueing 6  --  Verbal  -GJ     Reps 6   --  3  -GJ     Time 6  --  20 s  -GJ        Exercise 7    Exercise Name 7  --  supine HA/ER  -GJ     Cueing 7  --  Verbal;Demo  -GJ     Sets 7  --  2, each  -GJ     Reps 7  --  10  -GJ     Additional Comments  --  RTB  -GJ        Exercise 8    Exercise Name 8  --  wall wash  -GJ     Cueing 8  --  Verbal;Demo  -GJ     Reps 8  --  10  -GJ     Time 8  --  5s  -GJ        Exercise 9    Exercise Name 9  --  AR press  -GJ     Cueing 9  --  Verbal;Demo  -GJ     Sets 9  --  2  -GJ     Reps 9  --  10  -GJ     Time 9  --  RTB  -GJ     Additional Comments  --  TA bracing  -GJ        Exercise 10    Exercise Name 10  --  rows/Ext  -GJ     Cueing 10  --  Verbal;Demo  -GJ     Sets 10  --  2 each  -GJ     Reps 10  --  10  -GJ     Time 10  --  RTB  -GJ       User Key  (r) = Recorded By, (t) = Taken By, (c) = Cosigned By    Initials Name Provider Type    Gianni Graves, PT Physical Therapist                      Manual Rx (last 36 hours)      Manual Treatments     Row Name 08/30/21 0828 08/30/21 0700          Total Minutes    13820 - PT Manual Therapy Minutes  15  -GJ  --        Manual Rx 1    Manual Rx 1 Location  --  PA mobs T spine  -GJ     Manual Rx 1 Type  --  pt prone  -GJ     Manual Rx 1 Grade  --  grade II-IV  -GJ        Manual Rx 2    Manual Rx 2 Location  --  prone hip flexor/quad stretch bilaterally  -GJ       User Key  (r) = Recorded By, (t) = Taken By, (c) = Cosigned By    Initials Name Provider Type    Gianni Graves, PT Physical Therapist          PT OP Goals     Row Name 08/30/21 0800          PT Short Term Goals    STG Date to Achieve  09/17/21  -GJ     STG 1  pt. to be I with initial HEP to facilitate self management of their condition  -GJ     STG 1 Progress  Met  -GJ     STG 2  pt. to be educated in/verbalize understanding of the importance of posture/ergonomics in association with their condition to facilitate self management of their condition  -GJ     STG 2 Progress  Met  -GJ     STG 3  pt to report  sleeping through the night without interruption secondary to pain to facillitate optimal and restorative rest  -GJ     STG 3 Progress  Ongoing  -GJ        Long Term Goals    LTG Date to Achieve  10/16/21  -GJ     LTG 1  pt. to be I with advanced HEP to facilitate self management of their condition  -GJ     LTG 1 Progress  Ongoing  -GJ     LTG 2  pt. to report an Oswestry score </= 22%  to demonstrate decreased level of perceived disability  -GJ     LTG 2 Progress  Ongoing  -GJ     LTG 3  pt to report >/= 50% decrease in bilateral hand N/T to faciltiate ease with performiing leisure activities such as knitting  -GJ     LTG 3 Progress  Ongoing  -GJ     LTG 4  pt to report being able to perform household activities such as dishes, vacuuming without exacerbation of mid back pain to facilitate ease of perfoorming household chores  -GJ     LTG 4 Progress  Ongoing  -GJ       User Key  (r) = Recorded By, (t) = Taken By, (c) = Cosigned By    Initials Name Provider Type     Gianni Carrasco, PT Physical Therapist          Therapy Education  Education Details: no changes for home, did discussed taking breaks with activities like knitting or grouting bathroom  Given: HEP, Symptoms/condition management, Pain management, Posture/body mechanics, Mobility training  Program: Reinforced, Progressed  How Provided: Verbal  Provided to: Patient  Level of Understanding: Teach back education performed, Verbalized, Demonstrated              Time Calculation:   Start Time: 0830  Stop Time: 0915  Time Calculation (min): 45 min  Timed Charges  99938 - PT Therapeutic Exercise Minutes: 30  98602 - PT Manual Therapy Minutes: 15  Total Minutes  Timed Charges Total Minutes: 45   Total Minutes: 45  Therapy Charges for Today     Code Description Service Date Service Provider Modifiers Qty    99400326343 HC PT THER PROC EA 15 MIN 8/30/2021 Gianni Carrasco, PT GP 2    16386262802 HC PT MANUAL THERAPY EA 15 MIN 8/30/2021 Gianni Carrasco, PT GP 1                     Gianni Carrasco, PT  8/30/2021

## 2021-09-01 ENCOUNTER — TELEPHONE (OUTPATIENT)
Dept: PHYSICAL THERAPY | Facility: HOSPITAL | Age: 71
End: 2021-09-01

## 2021-09-09 ENCOUNTER — HOSPITAL ENCOUNTER (OUTPATIENT)
Dept: MAMMOGRAPHY | Facility: HOSPITAL | Age: 71
Discharge: HOME | End: 2021-09-09
Admitting: OBSTETRICS & GYNECOLOGY

## 2021-09-09 ENCOUNTER — HOSPITAL ENCOUNTER (OUTPATIENT)
Dept: PHYSICAL THERAPY | Facility: HOSPITAL | Age: 71
Setting detail: THERAPIES SERIES
Discharge: HOME | End: 2021-09-09

## 2021-09-09 DIAGNOSIS — Z12.31 ENCOUNTER FOR SCREENING MAMMOGRAM FOR BREAST CANCER: ICD-10-CM

## 2021-09-09 DIAGNOSIS — M54.50 LOW BACK PAIN, UNSPECIFIED BACK PAIN LATERALITY, UNSPECIFIED CHRONICITY, UNSPECIFIED WHETHER SCIATICA PRESENT: Primary | ICD-10-CM

## 2021-09-09 DIAGNOSIS — M54.2 CERVICALGIA: ICD-10-CM

## 2021-09-09 PROCEDURE — 77063 BREAST TOMOSYNTHESIS BI: CPT

## 2021-09-09 PROCEDURE — 77067 SCR MAMMO BI INCL CAD: CPT

## 2021-09-09 PROCEDURE — 97110 THERAPEUTIC EXERCISES: CPT | Performed by: PHYSICAL THERAPIST

## 2021-09-09 NOTE — THERAPY TREATMENT NOTE
Outpatient Physical Therapy Ortho Treatment Note  [facility]     Patient Name: Suly Ware  : 1950  MRN: [id number]  Today's Date: 2021      Visit Date: 2021    Visit Dx:    ICD-10-CM ICD-9-CM   1. Low back pain, unspecified back pain laterality, unspecified chronicity, unspecified whether sciatica present  M54.5 724.2   2. Cervicalgia  M54.2 723.1       Patient Active Problem List   Diagnosis   • Headache, migraine   • DVT (deep venous thrombosis) (CMS/HCC)   • Chronic right shoulder pain   • Influenza   • Chronic fatigue   • Family history of breast cancer- paternal aunt age 39, pt is BRCA neg.   • Rosacea   • Osteopenia   • Depression   • Screen for colon cancer   • LGSIL on Pap smear of cervix   • Recurrent vaginitis   • Benign essential microscopic hematuria        Past Medical History:   Diagnosis Date   • Acute serous otitis media    • Acute sinusitis    • Allergic     Pollen, sulfites   • Anxiety uray    • Arthralgia    • Colon polyp    • Depression    • DVT of lower extremity (deep venous thrombosis) (CMS/HCC)    • LGSIL on Pap smear of cervix 2019   • Migraine headache    • Osteopenia    • PFO (patent foramen ovale)    • Profound fatigue     when walking upstairs   • Pulmonary embolus (CMS/HCC)     from OCPs, thrombophilia w/u neg   • Pulmonary embolus (CMS/HCC) 2000    from OCPs, thrombophilia w/u neg   • Shoulder pain         Past Surgical History:   Procedure Laterality Date   • APPENDECTOMY     •  SECTION      x 2   • COLONOSCOPY     • COLONOSCOPY N/A 2019    Procedure: COLONOSCOPY INTO CECUM & TERMINAL ILEUM  WITH COLD SNARE POLYPECTOMY;  Surgeon: Brandon Nielsen MD;  Location: [facility] ENDOSCOPY;  Service: Gastroenterology   • DILATATION AND CURETTAGE     • EXPLORATORY LAPAROTOMY      dermoid removed   • HX OVARIAN CYSTECTOMY     • TUBAL ABDOMINAL LIGATION                         PT Assessment/Plan     Row Name 21 1444  "         PT Assessment    Assessment Comments  Ms. Ware returns today, reporting increased soreness following our last session for several days. She relates it to supine bilateral ER exercises.  we held on this and we held on manual today. Overall, She reports a good awareness of posture and ergonomic considerations relating to her condition, being able to self correct posture to alleviate symptoms in her neck/arms.  She has one additional session of PT scheduled at this time, after which she will likely be ready for independent management. She is in agreement with this plan.  -GJ        PT Plan    PT Plan Comments  likely DC to HEP after next session if she continues to progress at current rate.  -GJ       User Key  (r) = Recorded By, (t) = Taken By, (c) = Cosigned By    Initials Name Provider Type    Gianni Graves, PT Physical Therapist            OP Exercises     Row Name 09/09/21 1400             Subjective Comments    Subjective Comments  Overall I\"m better, the more I move the better I feel. I was really sore across my chest and (UT) tissues for 2 days after our last session .  -GJ         Total Minutes    29815 - PT Therapeutic Exercise Minutes  39  -GJ         Exercise 1    Exercise Name 1  reverse shoulder rolls  -GJ      Cueing 1  Verbal;Demo  -GJ      Reps 1  20  -GJ      Additional Comments  3#  -GJ         Exercise 2    Exercise Name 2  doorway stretch  -GJ      Cueing 2  Verbal;Demo  -GJ      Reps 2  3  -GJ      Time 2  20 s  -GJ         Exercise 3    Exercise Name 3  SL trunk rotation  -GJ      Cueing 3  Verbal;Demo  -GJ      Reps 3  5  -GJ      Time 3  10 s  -GJ         Exercise 4    Exercise Name 4  LTR  -GJ      Cueing 4  Verbal;Demo  -GJ      Reps 4  10  -GJ      Time 4  5 s  -GJ         Exercise 5    Exercise Name 5  UBE  -GJ      Cueing 5  Verbal  -GJ      Time 5  4 min  -GJ         Exercise 6    Exercise Name 6  pirirformis stretch  -GJ      Cueing 6  Verbal  -GJ      Reps 6  3  -GJ   "    Time 6  20 s  -GJ         Exercise 7    Exercise Name 7  supine HA  -GJ      Cueing 7  Verbal;Demo  -GJ      Sets 7  2  -GJ      Reps 7  10  -GJ      Additional Comments  RTB (held on ER today)  -GJ         Exercise 8    Exercise Name 8  wall wash  -GJ      Cueing 8  Verbal;Demo  -GJ      Reps 8  10  -GJ      Time 8  5s  -GJ         Exercise 9    Exercise Name 9  AR press  -GJ      Cueing 9  Verbal;Demo  -GJ      Sets 9  2  -GJ      Reps 9  10  -GJ      Time 9  RTB  -GJ      Additional Comments  TA bracing  -GJ         Exercise 10    Exercise Name 10  rows/Ext  -GJ      Cueing 10  Verbal;Demo  -GJ      Sets 10  2 each  -GJ      Reps 10  10  -GJ      Time 10  RTB  -GJ        User Key  (r) = Recorded By, (t) = Taken By, (c) = Cosigned By    Initials Name Provider Type    Gianni Graves, PT Physical Therapist                       PT OP Goals     Row Name 09/09/21 1400          PT Short Term Goals    STG Date to Achieve  09/17/21  -GJ     STG 1  pt. to be I with initial HEP to facilitate self management of their condition  -GJ     STG 1 Progress  Met  -GJ     STG 2  pt. to be educated in/verbalize understanding of the importance of posture/ergonomics in association with their condition to facilitate self management of their condition  -GJ     STG 2 Progress  Met  -GJ     STG 3  pt to report sleeping through the night without interruption secondary to pain to facillitate optimal and restorative rest  -GJ     STG 3 Progress  Ongoing;Partially Met  -GJ        Long Term Goals    LTG Date to Achieve  10/16/21  -GJ     LTG 1  pt. to be I with advanced HEP to facilitate self management of their condition  -GJ     LTG 1 Progress  Ongoing  -GJ     LTG 2  pt. to report an Oswestry score </= 22%  to demonstrate decreased level of perceived disability  -GJ     LTG 2 Progress  Ongoing  -GJ     LTG 3  pt to report >/= 50% decrease in bilateral hand N/T to faciltiate ease with performiing leisure activities such as knitting   -     LTG 3 Progress  Met  -     LTG 4  pt to report being able to perform household activities such as dishes, vacuuming without exacerbation of mid back pain to facilitate ease of perfoorming household chores  -     LTG 4 Progress  Ongoing  -     LTG 4 Progress Comments  pt reports awareness of postural implications  -       User Key  (r) = Recorded By, (t) = Taken By, (c) = Cosigned By    Initials Name Provider Type     Gianni Carrasco, PT Physical Therapist          Therapy Education  Education Details: continue current HEP, re-printed HEP for home.  Discussed continued postural awareness, discussed likely upcoming transition to independent program.  Given: HEP, Symptoms/condition management, Pain management, Posture/body mechanics, Mobility training  Program: Reinforced  How Provided: Verbal  Provided to: Patient  Level of Understanding: Verbalized              Time Calculation:   Start Time: 1400  Stop Time: 1439  Time Calculation (min): 39 min  Timed Charges  61428 - PT Therapeutic Exercise Minutes: 39  Total Minutes  Timed Charges Total Minutes: 39   Total Minutes: 39  Therapy Charges for Today     Code Description Service Date Service Provider Modifiers Qty    84108483095  PT THER PROC EA 15 MIN 9/9/2021 Gianni Carrasco, PT GP 3                    Gianni Carrasco, PT  9/9/2021

## 2021-09-16 ENCOUNTER — HOSPITAL ENCOUNTER (OUTPATIENT)
Dept: PHYSICAL THERAPY | Facility: HOSPITAL | Age: 71
Setting detail: THERAPIES SERIES
Discharge: HOME | End: 2021-09-16

## 2021-09-16 DIAGNOSIS — M54.2 CERVICALGIA: ICD-10-CM

## 2021-09-16 DIAGNOSIS — M54.50 LOW BACK PAIN, UNSPECIFIED BACK PAIN LATERALITY, UNSPECIFIED CHRONICITY, UNSPECIFIED WHETHER SCIATICA PRESENT: Primary | ICD-10-CM

## 2021-09-16 PROCEDURE — 97530 THERAPEUTIC ACTIVITIES: CPT | Performed by: PHYSICAL THERAPIST

## 2021-09-16 PROCEDURE — 97110 THERAPEUTIC EXERCISES: CPT | Performed by: PHYSICAL THERAPIST

## 2021-09-16 NOTE — THERAPY DISCHARGE NOTE
Outpatient Physical Therapy Ortho Treatment Note/Discharge Summary   [facility]     Patient Name: Suly Ware  : 1950  MRN: [id number]  Today's Date: 2021      Visit Date: 2021    Visit Dx:    ICD-10-CM ICD-9-CM   1. Low back pain, unspecified back pain laterality, unspecified chronicity, unspecified whether sciatica present  M54.5 724.2   2. Cervicalgia  M54.2 723.1       Patient Active Problem List   Diagnosis   • Headache, migraine   • DVT (deep venous thrombosis) (CMS/HCC)   • Chronic right shoulder pain   • Influenza   • Chronic fatigue   • Family history of breast cancer- paternal aunt age 39, pt is BRCA neg.   • Rosacea   • Osteopenia   • Depression   • Screen for colon cancer   • LGSIL on Pap smear of cervix   • Recurrent vaginitis   • Benign essential microscopic hematuria        Past Medical History:   Diagnosis Date   • Acute serous otitis media    • Acute sinusitis    • Allergic     Pollen, sulfites   • Anxiety uray    • Arthralgia    • Colon polyp    • Depression    • DVT of lower extremity (deep venous thrombosis) (CMS/HCC)    • LGSIL on Pap smear of cervix 2019   • Migraine headache    • Osteopenia    • PFO (patent foramen ovale)    • Profound fatigue     when walking upstairs   • Pulmonary embolus (CMS/HCC)     from OCPs, thrombophilia w/u neg   • Pulmonary embolus (CMS/HCC) 2000    from OCPs, thrombophilia w/u neg   • Shoulder pain         Past Surgical History:   Procedure Laterality Date   • APPENDECTOMY     •  SECTION      x 2   • COLONOSCOPY     • COLONOSCOPY N/A 2019    Procedure: COLONOSCOPY INTO CECUM & TERMINAL ILEUM  WITH COLD SNARE POLYPECTOMY;  Surgeon: Brandon Nielsen MD;  Location: [facility] ENDOSCOPY;  Service: Gastroenterology   • DILATATION AND CURETTAGE     • EXPLORATORY LAPAROTOMY      dermoid removed   • HX OVARIAN CYSTECTOMY     • TUBAL ABDOMINAL LIGATION                         PT Assessment/Plan      Row Name 09/16/21 0842          PT Assessment    Assessment Comments  Ms. Ware has attended 5 sessions of physical therapy from 8/16/20/21-9/16/2021 for her C/L spine pain. SHe is independent with her HEP, verbalizes a good understanding of her conditoin and is ready for transitionto independent management. We answerd questions today and encourged pt to call with any questions. Ms. Ware is discharged from outpatient physical therpay to an independent program.  -GJ        PT Plan    PT Plan Comments  DC to independent program. Continue to perform HEP, call with questions.  -GJ       User Key  (r) = Recorded By, (t) = Taken By, (c) = Cosigned By    Initials Name Provider Type    Gianni Graves, PT Physical Therapist              OP Exercises     Row Name 09/16/21 0828 09/16/21 0800          Subjective Comments    Subjective Comments  --  I am ready to discharge, I do have some questions  -GJ        Total Minutes    31551 - PT Therapeutic Exercise Minutes  30  -GJ  --     67955 - PT Therapeutic Activity Minutes  10 education  -GJ  --        Exercise 1    Exercise Name 1  --  reverse shoulder rolls  -GJ     Cueing 1  --  Verbal;Demo  -GJ     Reps 1  --  20  -GJ     Additional Comments  --  3#  -GJ        Exercise 2    Exercise Name 2  --  doorway stretch  -GJ     Cueing 2  --  Verbal;Demo  -GJ     Reps 2  --  3  -GJ     Time 2  --  20 s  -GJ        Exercise 3    Exercise Name 3  --  SL trunk rotation  -GJ     Cueing 3  --  Verbal;Demo  -GJ     Reps 3  --  5  -GJ     Time 3  --  10 s  -GJ        Exercise 4    Exercise Name 4  --  LTR  -GJ     Cueing 4  --  Verbal;Demo  -GJ     Reps 4  --  10  -GJ     Time 4  --  5 s  -GJ        Exercise 5    Exercise Name 5  --  UBE  -GJ     Cueing 5  --  Verbal  -GJ     Time 5  --  4 min  -GJ        Exercise 6    Exercise Name 6  --  pirirformis stretch  -GJ     Cueing 6  --  Verbal  -GJ     Reps 6  --  3  -GJ     Time 6  --  20 s  -GJ        Exercise 9    Exercise Name 9  --   AR press  -GJ     Cueing 9  --  Verbal;Demo  -GJ     Sets 9  --  2  -GJ     Reps 9  --  10  -GJ     Time 9  --  RTB  -GJ        Exercise 10    Exercise Name 10  --  rows/Ext  -GJ     Cueing 10  --  Verbal;Demo  -GJ     Sets 10  --  2 each  -GJ     Reps 10  --  10  -GJ     Time 10  --  RTB  -GJ       User Key  (r) = Recorded By, (t) = Taken By, (c) = Cosigned By    Initials Name Provider Type    Gianni Graves, PT Physical Therapist                         PT OP Goals     Row Name 09/16/21 0800          PT Short Term Goals    STG Date to Achieve  09/17/21  -GJ     STG 1  pt. to be I with initial HEP to facilitate self management of their condition  -GJ     STG 1 Progress  Met  -GJ     STG 2  pt. to be educated in/verbalize understanding of the importance of posture/ergonomics in association with their condition to facilitate self management of their condition  -GJ     STG 2 Progress  Met  -GJ     STG 3  pt to report sleeping through the night without interruption secondary to pain to facillitate optimal and restorative rest  -GJ     STG 3 Progress  Partially Met  -GJ        Long Term Goals    LTG Date to Achieve  10/16/21  -GJ     LTG 1  pt. to be I with advanced HEP to facilitate self management of their condition  -GJ     LTG 1 Progress  Met  -GJ     LTG 2  pt. to report an Oswestry score </= 22%  to demonstrate decreased level of perceived disability  -GJ     LTG 2 Progress  Not Met  -GJ     LTG 2 Progress Comments  not reassessed  -GJ     LTG 3  pt to report >/= 50% decrease in bilateral hand N/T to faciltiate ease with performiing leisure activities such as knitting  -GJ     LTG 3 Progress  Met  -GJ     LTG 4  pt to report being able to perform household activities such as dishes, vacuuming without exacerbation of mid back pain to facilitate ease of perfoorming household chores  -GJ     LTG 4 Progress  Met  -GJ       User Key  (r) = Recorded By, (t) = Taken By, (c) = Cosigned By    Initials Name Provider  Type    GJ Gianni Carrasco, PT Physical Therapist          Therapy Education  Education Details: answered questions, particularly re: sleeeping postures, encouraged pt to call with questions  Given: HEP, Symptoms/condition management, Pain management, Posture/body mechanics, Mobility training  Program: Reinforced  How Provided: Verbal  Provided to: Patient  Level of Understanding: Verbalized              Time Calculation:   Start Time: 0830  Stop Time: 0912  Time Calculation (min): 42 min  Timed Charges  84729 - PT Therapeutic Exercise Minutes: 30  51894 - PT Therapeutic Activity Minutes: 10 (education)  Total Minutes  Timed Charges Total Minutes: 40   Total Minutes: 40  Therapy Charges for Today     Code Description Service Date Service Provider Modifiers Qty    61028852730  PT THER PROC EA 15 MIN 9/16/2021 Gianni Carrasco, PT GP 2    [id number]  PT THERAPEUTIC ACT EA 15 MIN 9/16/2021 Gianni Carrasco, PT GP 1                OP PT Discharge Summary  Date of Discharge: 09/16/21  Reason for Discharge: Independent  Outcomes Achieved: Patient able to partially acheive established goals  Discharge Destination: Home with home program  Discharge Instructions/Additional Comments: continue HEP, call with questions      Gianni Carrasco, PT  9/16/2021

## 2021-11-15 ENCOUNTER — OFFICE VISIT (OUTPATIENT)
Dept: OBSTETRICS AND GYNECOLOGY | Facility: CLINIC | Age: 71
End: 2021-11-15

## 2021-11-15 VITALS
HEIGHT: 63 IN | WEIGHT: 174 LBS | BODY MASS INDEX: 30.83 KG/M2 | SYSTOLIC BLOOD PRESSURE: 132 MMHG | DIASTOLIC BLOOD PRESSURE: 72 MMHG

## 2021-11-15 DIAGNOSIS — N39.498 OTHER URINARY INCONTINENCE: ICD-10-CM

## 2021-11-15 DIAGNOSIS — Z01.419 ROUTINE GYNECOLOGICAL EXAMINATION: ICD-10-CM

## 2021-11-15 DIAGNOSIS — Z11.51 SPECIAL SCREENING EXAMINATION FOR HUMAN PAPILLOMAVIRUS (HPV): ICD-10-CM

## 2021-11-15 DIAGNOSIS — Z01.419 PAP SMEAR, LOW-RISK: Primary | ICD-10-CM

## 2021-11-15 DIAGNOSIS — Z12.31 ENCOUNTER FOR SCREENING MAMMOGRAM FOR MALIGNANT NEOPLASM OF BREAST: ICD-10-CM

## 2021-11-15 LAB
BILIRUB BLD-MCNC: NEGATIVE MG/DL
CLARITY, POC: CLEAR
COLOR UR: YELLOW
GLUCOSE UR STRIP-MCNC: NEGATIVE MG/DL
KETONES UR QL: NEGATIVE
LEUKOCYTE EST, POC: NEGATIVE
NITRITE UR-MCNC: NEGATIVE MG/ML
PH UR: 5 [PH] (ref 5–8)
PROT UR STRIP-MCNC: NEGATIVE MG/DL
RBC # UR STRIP: NEGATIVE /UL
SP GR UR: 1 (ref 1–1.03)
UROBILINOGEN UR QL: NORMAL

## 2021-11-15 PROCEDURE — 81002 URINALYSIS NONAUTO W/O SCOPE: CPT | Performed by: OBSTETRICS & GYNECOLOGY

## 2021-11-15 PROCEDURE — G0101 CA SCREEN;PELVIC/BREAST EXAM: HCPCS | Performed by: OBSTETRICS & GYNECOLOGY

## 2021-11-15 PROCEDURE — 3015F CERV CANCER SCREEN DOCD: CPT | Performed by: OBSTETRICS & GYNECOLOGY

## 2021-11-15 NOTE — PROGRESS NOTES
GYN Annual Exam     CC- Here for annual exam.     Suly Ware is a 71 y.o. female established patient who presents for annual well woman exam. No  VB. She is having some random urinary leakage. She is wearing a pantyliner a day and that is enough coverage. We discussed options and she is interested in trying exercises at home first. She had a LGSIL pap in 2019 and her bx were normal. In 2020 her pap was normal.     OB History        3    Para   2    Term   2            AB   1    Living   1       SAB   1    IAB        Ectopic        Molar        Multiple        Live Births              Obstetric Comments   2 C/S  1 SAB with D&C             Menarche:16  Menopause:55  HRT:none  Current contraception: post menopausal status and tubal ligation  History of abnormal Pap smear: no   History of abnormal mammogram: no  Family history of uterine, colon or ovarian cancer: no  Family history of breast cancer: yes - m aunt breast cancer age 39, pt is BRCA neg  STD's: none  Last pap smear:2020- nl pap  Gardasil: none  AMNA: personal history-DVT/PE, family h/o - dad with DVT      Health Maintenance   Topic Date Due   • HEPATITIS C SCREENING  Never done   • Pneumococcal Vaccine 65+ (2 of 2 - PPSV23) 2020   • INFLUENZA VACCINE  2021   • DXA SCAN  2021   • ANNUAL WELLNESS VISIT  2022   • PAP SMEAR  2023   • MAMMOGRAM  2023   • COLORECTAL CANCER SCREENING  2024   • TDAP/TD VACCINES (2 - Td or Tdap) 2029   • COVID-19 Vaccine  Completed   • ZOSTER VACCINE  Completed       Past Medical History:   Diagnosis Date   • Acute serous otitis media    • Acute sinusitis    • Allergic     Pollen, sulfites   • Anxiety ura2000   • Arthralgia    • Colon polyp    • Depression    • DVT of lower extremity (deep venous thrombosis) (HCC)    • LGSIL on Pap smear of cervix 2019   • Migraine headache    • Osteopenia    • PFO (patent foramen ovale)    • Profound fatigue      when walking upstairs   • Pulmonary embolus (HCC)     from OCPs, thrombophilia w/u neg   • Pulmonary embolus (HCC) 2000    from OCPs, thrombophilia w/u neg   • Shoulder pain        Past Surgical History:   Procedure Laterality Date   • APPENDECTOMY     •  SECTION      x 2   • COLONOSCOPY     • COLONOSCOPY N/A 2019    Procedure: COLONOSCOPY INTO CECUM & TERMINAL ILEUM  WITH COLD SNARE POLYPECTOMY;  Surgeon: Brandon Nielsen MD;  Location: [facility] ENDOSCOPY;  Service: Gastroenterology   • DILATATION AND CURETTAGE     • EXPLORATORY LAPAROTOMY      dermoid removed   • HX OVARIAN CYSTECTOMY     • TUBAL ABDOMINAL LIGATION           Current Outpatient Medications:   •  aspirin 81 MG EC tablet, Take 81 mg by mouth Daily., Disp: , Rfl:   •  calcium carbonate (OS-HENRY) 600 MG tablet, Take 600 mg by mouth Daily., Disp: , Rfl:   •  Cyanocobalamin (VITAMIN B-12 PO), Take 1 tablet by mouth Daily., Disp: , Rfl:   •  Magnesium 100 MG capsule, Take  by mouth., Disp: , Rfl:   •  metroNIDAZOLE (Metrogel) 1 % gel, Apply  topically to the appropriate area as directed Daily., Disp: 1 tube, Rfl: 2  •  VITAMIN D PO, Take 1 tablet by mouth Daily., Disp: , Rfl:     Allergies   Allergen Reactions   • Sulfites Anaphylaxis   • Bactrim [Sulfamethoxazole-Trimethoprim] Hives       Social History     Tobacco Use   • Smoking status: Former Smoker     Packs/day: 0.00     Years: 0.00     Pack years: 0.00     Start date:      Quit date:      Years since quittin.8   • Smokeless tobacco: Never Used   Vaping Use   • Vaping Use: Never used   Substance Use Topics   • Alcohol use: Yes     Alcohol/week: 2.0 standard drinks     Types: 1 Cans of beer, 1 Shots of liquor per week     Comment: social drinker   • Drug use: No       Family History   Problem Relation Age of Onset   • Stroke Mother    • Hyperlipidemia Mother    • Heart disease Mother    • Heart attack Mother    • Diabetes Mother    • Osteoporosis Mother    •  "Arthritis Mother    • Asthma Mother    • Depression Mother    • Hearing loss Mother    • Miscarriages / Stillbirths Mother    • Stroke Father    • Aortic aneurysm Father    • Deep vein thrombosis Father    • Stroke Brother    • Hyperlipidemia Brother    • Heart disease Brother    • Breast cancer Maternal Aunt    • No Known Problems Daughter    • No Known Problems Daughter    • Ovarian cancer Neg Hx    • Colon cancer Neg Hx    • Colon polyps Neg Hx        Review of Systems   Constitutional: Positive for activity change (retired). Negative for appetite change, fatigue, fever and unexpected weight change.   Eyes: Negative for photophobia and visual disturbance.   Respiratory: Negative for cough and shortness of breath.    Cardiovascular: Negative for chest pain and palpitations.   Gastrointestinal: Negative for abdominal distention, abdominal pain, constipation, diarrhea and nausea.   Endocrine: Negative for cold intolerance and heat intolerance.   Genitourinary: Negative for dyspareunia, dysuria, frequency, genital sores, hematuria, menstrual problem, pelvic pain and vaginal discharge.        + loss of urine   Musculoskeletal: Negative for back pain.   Skin: Negative for color change and rash.   Neurological: Negative for headaches.   Hematological: Negative for adenopathy. Does not bruise/bleed easily.   Psychiatric/Behavioral: Negative for dysphoric mood. The patient is not nervous/anxious.        /72   Ht 160 cm (63\")   Wt 78.9 kg (174 lb)   LMP  (LMP Unknown)   Breastfeeding No   BMI 30.82 kg/m²     Physical Exam   Constitutional: She is oriented to person, place, and time. She appears well-developed.   HENT:   Head: Normocephalic and atraumatic.   Eyes: Conjunctivae are normal. No scleral icterus.   Neck: No thyromegaly present.   Cardiovascular: Normal rate and regular rhythm.   Pulmonary/Chest: Effort normal and breath sounds normal. Right breast exhibits no inverted nipple, no mass, no nipple " discharge, no skin change and no tenderness. Left breast exhibits no inverted nipple, no mass, no nipple discharge, no skin change and no tenderness.   Abdominal: Soft. Normal appearance and bowel sounds are normal. She exhibits no distension and no mass. There is no abdominal tenderness. There is no rebound and no guarding. No hernia.   Genitourinary:    Rectum normal.      Pelvic exam was performed with patient supine.   There is no rash, tenderness or lesion on the right labia. There is no rash, tenderness or lesion on the left labia. Uterus is not deviated, not enlarged, not fixed and not tender. Cervix exhibits no motion tenderness, no lesion, no discharge and no friability. Right adnexum displays no mass, no tenderness and no fullness. Left adnexum displays no mass, no tenderness and no fullness.    No vaginal discharge, erythema, tenderness or bleeding.   No erythema, tenderness or bleeding in the vagina.    No foreign body in the vagina.      No signs of injury in the vagina.     Neurological: She is alert and oriented to person, place, and time.   Skin: Skin is warm and dry.   Psychiatric: Her behavior is normal. Mood, judgment and thought content normal.   Nursing note and vitals reviewed.         Assessment/Plan    1) GYN HM: check pap  SBE demonstrated and encouraged.  2) STD screening: declines Condoms encouraged.  3) Bone health - Weight bearing exercise, dietary calcium recommendations and vitamin D reviewed.   4) Diet and Exercise discussed  5) Smoking Status: No  6) Urinary leakage- mild, insensible. Info on Kegel exercises given and if no benefit after 2-3 months, consider urodynamic testing and/or pelvic floor PT  7)MM2021, B1. Schedule MMG 2022  8) DEXA- UTD 2019, osteopenia with LROF, scheduled for DEXA in 2021  9)C scope- UTD 2019- repeat 5 years   10) Parts of this document have been copied or forwarded from her previous visits and have been reviewed, updated and edited as  indicated.   11)  I saw the patient with a face mask, gloves and eye protection  The patient herself was masked.  Social distancing was observed as appropriate.  12)Follow up prn and 1-2 year years, pending pap results.        Diagnoses and all orders for this visit:    1. Pap smear, low-risk (Primary)  -     Pap IG (Image Guided)  -     POC Urinalysis Dipstick    2. Routine gynecological examination  -     Pap IG (Image Guided)  -     POC Urinalysis Dipstick    3. Special screening examination for human papillomavirus (HPV)    4. Other urinary incontinence        Rosa Beltre MD  11/15/2021    14:18 EST

## 2021-12-02 ENCOUNTER — HOSPITAL ENCOUNTER (OUTPATIENT)
Dept: BONE DENSITY | Facility: HOSPITAL | Age: 71
Discharge: HOME | End: 2021-12-02
Admitting: FAMILY MEDICINE

## 2021-12-02 DIAGNOSIS — Z78.0 POSTMENOPAUSAL: ICD-10-CM

## 2021-12-02 PROCEDURE — 77080 DXA BONE DENSITY AXIAL: CPT

## 2022-06-22 ENCOUNTER — TRANSCRIBE ORDERS (OUTPATIENT)
Dept: ADMINISTRATIVE | Facility: HOSPITAL | Age: 72
End: 2022-06-22

## 2022-06-22 DIAGNOSIS — Z12.31 SCREENING MAMMOGRAM, ENCOUNTER FOR: Primary | ICD-10-CM

## 2022-07-15 DIAGNOSIS — Z13.220 LIPID SCREENING: ICD-10-CM

## 2022-07-15 DIAGNOSIS — Z00.00 MEDICARE ANNUAL WELLNESS VISIT, SUBSEQUENT: Primary | ICD-10-CM

## 2022-07-15 DIAGNOSIS — R53.82 CHRONIC FATIGUE: ICD-10-CM

## 2022-07-15 DIAGNOSIS — Z11.59 ENCOUNTER FOR HEPATITIS C SCREENING TEST FOR LOW RISK PATIENT: ICD-10-CM

## 2022-08-04 ENCOUNTER — OFFICE VISIT (OUTPATIENT)
Dept: FAMILY MEDICINE CLINIC | Facility: CLINIC | Age: 72
End: 2022-08-04

## 2022-08-04 ENCOUNTER — LAB (OUTPATIENT)
Dept: LAB | Facility: HOSPITAL | Age: 72
End: 2022-08-04

## 2022-08-04 ENCOUNTER — HOSPITAL ENCOUNTER (OUTPATIENT)
Dept: GENERAL RADIOLOGY | Facility: HOSPITAL | Age: 72
Discharge: HOME | End: 2022-08-04

## 2022-08-04 VITALS
WEIGHT: 178.2 LBS | TEMPERATURE: 97.3 F | RESPIRATION RATE: 12 BRPM | HEART RATE: 67 BPM | DIASTOLIC BLOOD PRESSURE: 72 MMHG | OXYGEN SATURATION: 97 % | BODY MASS INDEX: 31.57 KG/M2 | SYSTOLIC BLOOD PRESSURE: 145 MMHG | HEIGHT: 63 IN

## 2022-08-04 DIAGNOSIS — M25.551 RIGHT HIP PAIN: ICD-10-CM

## 2022-08-04 DIAGNOSIS — G89.29 CHRONIC BILATERAL LOW BACK PAIN WITHOUT SCIATICA: ICD-10-CM

## 2022-08-04 DIAGNOSIS — R73.9 HYPERGLYCEMIA: ICD-10-CM

## 2022-08-04 DIAGNOSIS — Z00.00 MEDICARE ANNUAL WELLNESS VISIT, SUBSEQUENT: Primary | ICD-10-CM

## 2022-08-04 DIAGNOSIS — M54.50 CHRONIC BILATERAL LOW BACK PAIN WITHOUT SCIATICA: ICD-10-CM

## 2022-08-04 LAB — HBA1C MFR BLD: 5.7 % (ref 4.8–5.6)

## 2022-08-04 PROCEDURE — G0439 PPPS, SUBSEQ VISIT: HCPCS | Performed by: FAMILY MEDICINE

## 2022-08-04 PROCEDURE — 99213 OFFICE O/P EST LOW 20 MIN: CPT | Performed by: FAMILY MEDICINE

## 2022-08-04 PROCEDURE — 72100 X-RAY EXAM L-S SPINE 2/3 VWS: CPT

## 2022-08-04 PROCEDURE — 36415 COLL VENOUS BLD VENIPUNCTURE: CPT | Performed by: FAMILY MEDICINE

## 2022-08-04 PROCEDURE — 73502 X-RAY EXAM HIP UNI 2-3 VIEWS: CPT

## 2022-08-04 PROCEDURE — 1160F RVW MEDS BY RX/DR IN RCRD: CPT | Performed by: FAMILY MEDICINE

## 2022-08-04 PROCEDURE — 1170F FXNL STATUS ASSESSED: CPT | Performed by: FAMILY MEDICINE

## 2022-08-04 PROCEDURE — 83036 HEMOGLOBIN GLYCOSYLATED A1C: CPT | Performed by: FAMILY MEDICINE

## 2022-08-04 NOTE — PROGRESS NOTES
The ABCs of the Annual Wellness Visit  Subsequent Medicare Wellness Visit    Chief Complaint   Patient presents with   • Medicare Wellness-subsequent      Subjective    History of Present Illness:  Suly Ware is a 71 y.o. female who presents for a Subsequent Medicare Wellness Visit.  Patient with history of chronic low back pain without radiation also complaining of right hip pain.  Denies any injury.  She is having this pain for more than 3 months        The following portions of the patient's history were reviewed and   updated as appropriate: allergies, current medications, past family history, past medical history, past social history, past surgical history and problem list.    Compared to one year ago, the patient feels her physical   health is the same.    Compared to one year ago, the patient feels her mental   health is better.    Recent Hospitalizations:  She was not admitted to the hospital during the last year.       Current Medical Providers:  Patient Care Team:  Roberta Vazquez MD as PCP - General (Family Medicine)    Outpatient Medications Prior to Visit   Medication Sig Dispense Refill   • calcium carbonate (OS-HENRY) 600 MG tablet Take 600 mg by mouth Daily.     • Cyanocobalamin (VITAMIN B-12 PO) Take 1 tablet by mouth Daily.     • Magnesium 100 MG capsule Take  by mouth.     • metroNIDAZOLE (Metrogel) 1 % gel Apply  topically to the appropriate area as directed Daily. (Patient taking differently: Apply  topically to the appropriate area as directed As Needed.) 1 tube 2   • VITAMIN D PO Take 1 tablet by mouth Daily.     • aspirin 81 MG EC tablet Take 81 mg by mouth Daily.       No facility-administered medications prior to visit.       No opioid medication identified on active medication list. I have reviewed chart for other potential  high risk medication/s and harmful drug interactions in the elderly.          Aspirin is on active medication list. Aspirin use is indicated based on review of  "current medical condition/s. Pros and cons of this therapy have been discussed today. Benefits of this medication outweigh potential harm.  Patient has been encouraged to continue taking this medication.  .      Patient Active Problem List   Diagnosis   • Headache, migraine   • DVT (deep venous thrombosis) (HCC)   • Chronic right shoulder pain   • Influenza   • Chronic fatigue   • Family history of breast cancer- paternal aunt age 39, pt is BRCA neg.   • Rosacea   • Osteopenia   • Depression   • Screen for colon cancer   • LGSIL on Pap smear of cervix   • Recurrent vaginitis   • Benign essential microscopic hematuria     Advance Care Planning  Advance Directive is not on file.  ACP discussion was held with the patient during this visit. Patient has an advance directive (not in EMR), copy requested.          Objective    Vitals:    08/04/22 1122   BP: 145/72   Pulse: 67   Resp: 12   Temp: 97.3 °F (36.3 °C)   TempSrc: Infrared   SpO2: 97%   Weight: 80.8 kg (178 lb 3.2 oz)   Height: 160 cm (63\")   PainSc: 0-No pain     Estimated body mass index is 31.57 kg/m² as calculated from the following:    Height as of this encounter: 160 cm (63\").    Weight as of this encounter: 80.8 kg (178 lb 3.2 oz).    BMI is >= 30 and <35. (Class 1 Obesity). The following options were offered after discussion;: exercise counseling/recommendations and nutrition counseling/recommendations      Does the patient have evidence of cognitive impairment? No    Physical Exam  Constitutional:       Appearance: Normal appearance. She is well-developed.   HENT:      Head: Normocephalic and atraumatic.      Right Ear: Tympanic membrane, ear canal and external ear normal.      Left Ear: Tympanic membrane, ear canal and external ear normal.      Nose: Nose normal.      Mouth/Throat:      Mouth: Mucous membranes are moist.   Eyes:      General: No scleral icterus.     Extraocular Movements: Extraocular movements intact.      Conjunctiva/sclera: " Conjunctivae normal.      Pupils: Pupils are equal, round, and reactive to light.   Neck:      Thyroid: No thyromegaly.   Cardiovascular:      Rate and Rhythm: Normal rate and regular rhythm.      Pulses: Normal pulses.      Heart sounds: Normal heart sounds.   Pulmonary:      Effort: Pulmonary effort is normal. No respiratory distress.      Breath sounds: Normal breath sounds. No wheezing or rales.   Abdominal:      General: Bowel sounds are normal. There is no distension.      Palpations: Abdomen is soft. There is no mass.      Tenderness: There is no abdominal tenderness.      Hernia: No hernia is present.   Musculoskeletal:         General: No swelling.      Cervical back: Normal range of motion and neck supple.      Lumbar back: Tenderness present. Negative right straight leg raise test and negative left straight leg raise test.      Right hip: Decreased range of motion.   Lymphadenopathy:      Cervical: No cervical adenopathy.   Skin:     General: Skin is warm.   Neurological:      General: No focal deficit present.      Mental Status: She is alert and oriented to person, place, and time.      Cranial Nerves: No cranial nerve deficit.      Sensory: No sensory deficit.      Deep Tendon Reflexes: Reflexes normal.   Psychiatric:         Mood and Affect: Mood normal.         Behavior: Behavior normal.         Thought Content: Thought content normal.         Judgment: Judgment normal.       Lab Results   Component Value Date    CHLPL 198 2022    TRIG 79 2022    HDL 74 2022     (H) 2022    VLDL 14 2022    HGBA1C 5.70 (H) 2022            HEALTH RISK ASSESSMENT    Smoking Status:  Social History     Tobacco Use   Smoking Status Former Smoker   • Packs/day: 0.00   • Years: 0.00   • Pack years: 0.00   • Start date:    • Quit date:    • Years since quittin.6   Smokeless Tobacco Never Used     Alcohol Consumption:  Social History     Substance and Sexual Activity    Alcohol Use Yes   • Alcohol/week: 2.0 standard drinks   • Types: 1 Cans of beer, 1 Shots of liquor per week    Comment: social drinker     Fall Risk Screen:    LESLYE Fall Risk Assessment was completed, and patient is at LOW risk for falls.Assessment completed on:8/4/2022    Depression Screening:  PHQ-2/PHQ-9 Depression Screening 8/4/2022   Retired PHQ-9 Total Score -   Retired Total Score -   Little Interest or Pleasure in Doing Things 0-->not at all   Feeling Down, Depressed or Hopeless 0-->not at all   PHQ-9: Brief Depression Severity Measure Score 0       Health Habits and Functional and Cognitive Screening:  Functional & Cognitive Status 8/4/2022   Do you have difficulty preparing food and eating? No   Do you have difficulty bathing yourself, getting dressed or grooming yourself? No   Do you have difficulty using the toilet? No   Do you have difficulty moving around from place to place? No   Do you have trouble with steps or getting out of a bed or a chair? No   Current Diet Well Balanced Diet   Dental Exam Up to date   Eye Exam Up to date   Exercise (times per week) 5 times per week   Current Exercises Include Walking   Current Exercise Activities Include -   Do you need help using the phone?  No   Are you deaf or do you have serious difficulty hearing?  Yes   Do you need help with transportation? No   Do you need help shopping? No   Do you need help preparing meals?  No   Do you need help with housework?  No   Do you need help with laundry? No   Do you need help taking your medications? No   Do you need help managing money? No   Do you ever drive or ride in a car without wearing a seat belt? No   Have you felt unusual stress, anger or loneliness in the last month? No   Who do you live with? Spouse   If you need help, do you have trouble finding someone available to you? No   Have you been bothered in the last four weeks by sexual problems? No   Do you have difficulty concentrating, remembering or making  decisions? No       Age-appropriate Screening Schedule:  Refer to the list below for future screening recommendations based on patient's age, sex and/or medical conditions. Orders for these recommended tests are listed in the plan section. The patient has been provided with a written plan.    Health Maintenance   Topic Date Due   • INFLUENZA VACCINE  10/01/2022   • MAMMOGRAM  09/09/2023   • DXA SCAN  12/02/2023   • PAP SMEAR  11/15/2024   • TDAP/TD VACCINES (2 - Td or Tdap) 08/05/2029   • ZOSTER VACCINE  Completed              Assessment & Plan   CMS Preventative Services Quick Reference  Risk Factors Identified During Encounter  Obesity/Overweight   The above risks/problems have been discussed with the patient.  Follow up actions/plans if indicated are seen below in the Assessment/Plan Section.  Pertinent information has been shared with the patient in the After Visit Summary.    Diagnoses and all orders for this visit:    1. Medicare annual wellness visit, subsequent (Primary)    2. Hyperglycemia  -     Hemoglobin A1c    3. Right hip pain  -     XR Hip With or Without Pelvis 2 - 3 View Right  -     XR Spine Lumbar 2 or 3 View    4. Chronic bilateral low back pain without sciatica  -     XR Spine Lumbar 2 or 3 View      Suly Ware  Is a 71-year-old female patient came here for  Medicare annual wellness visit, preventive she is up-to-date with colonoscopy mammogram.  Vitamin D calcium supplement and weightbearing exercises recommended to patient.  Labs reviewed.  Low-carb diet also recommended to her.  She is up-to-date with immunization.  She is also seen today for  Right hip pain  Chronic low back pain without sciatica, I will check x-ray hip and x-ray lumbar spine.  We will also refer to physical therapy .        Follow Up:   No follow-ups on file.     An After Visit Summary and PPPS were made available to the patient.

## 2022-08-11 ENCOUNTER — TELEPHONE (OUTPATIENT)
Dept: FAMILY MEDICINE CLINIC | Facility: CLINIC | Age: 72
End: 2022-08-11

## 2022-08-11 NOTE — TELEPHONE ENCOUNTER
Suly Jeanie requested to be referred MRI and PT. She had a xray completed on 8/4/22.     Please advise

## 2022-08-12 DIAGNOSIS — M54.50 CHRONIC BILATERAL LOW BACK PAIN WITHOUT SCIATICA: Primary | ICD-10-CM

## 2022-08-12 DIAGNOSIS — M25.551 RIGHT HIP PAIN: ICD-10-CM

## 2022-08-12 DIAGNOSIS — G89.29 CHRONIC BILATERAL LOW BACK PAIN WITHOUT SCIATICA: Primary | ICD-10-CM

## 2022-09-07 ENCOUNTER — HOSPITAL ENCOUNTER (OUTPATIENT)
Dept: PHYSICAL THERAPY | Facility: HOSPITAL | Age: 72
Setting detail: THERAPIES SERIES
Discharge: HOME | End: 2022-09-07

## 2022-09-07 DIAGNOSIS — M54.50 CHRONIC BILATERAL LOW BACK PAIN WITHOUT SCIATICA: ICD-10-CM

## 2022-09-07 DIAGNOSIS — Z74.09 IMPAIRED MOBILITY: ICD-10-CM

## 2022-09-07 DIAGNOSIS — G89.29 CHRONIC BILATERAL LOW BACK PAIN WITHOUT SCIATICA: ICD-10-CM

## 2022-09-07 DIAGNOSIS — M54.2 CERVICALGIA: ICD-10-CM

## 2022-09-07 DIAGNOSIS — M25.551 RIGHT HIP PAIN: ICD-10-CM

## 2022-09-07 DIAGNOSIS — M54.50 LOW BACK PAIN, UNSPECIFIED BACK PAIN LATERALITY, UNSPECIFIED CHRONICITY, UNSPECIFIED WHETHER SCIATICA PRESENT: Primary | ICD-10-CM

## 2022-09-07 PROCEDURE — 97110 THERAPEUTIC EXERCISES: CPT | Performed by: PHYSICAL THERAPIST

## 2022-09-07 PROCEDURE — 97161 PT EVAL LOW COMPLEX 20 MIN: CPT | Performed by: PHYSICAL THERAPIST
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
## 2022-09-08 ENCOUNTER — HOSPITAL ENCOUNTER (OUTPATIENT)
Dept: PHYSICAL THERAPY | Facility: HOSPITAL | Age: 72
Setting detail: THERAPIES SERIES
Discharge: HOME | End: 2022-09-08

## 2022-09-08 DIAGNOSIS — M54.50 CHRONIC BILATERAL LOW BACK PAIN WITHOUT SCIATICA: Primary | ICD-10-CM

## 2022-09-08 DIAGNOSIS — M54.50 LOW BACK PAIN, UNSPECIFIED BACK PAIN LATERALITY, UNSPECIFIED CHRONICITY, UNSPECIFIED WHETHER SCIATICA PRESENT: ICD-10-CM

## 2022-09-08 DIAGNOSIS — M54.2 CERVICALGIA: ICD-10-CM

## 2022-09-08 DIAGNOSIS — M25.551 RIGHT HIP PAIN: ICD-10-CM

## 2022-09-08 DIAGNOSIS — Z74.09 IMPAIRED MOBILITY: ICD-10-CM

## 2022-09-08 DIAGNOSIS — G89.29 CHRONIC BILATERAL LOW BACK PAIN WITHOUT SCIATICA: Primary | ICD-10-CM

## 2022-09-08 PROCEDURE — 97110 THERAPEUTIC EXERCISES: CPT

## 2022-09-08 PROCEDURE — 97140 MANUAL THERAPY 1/> REGIONS: CPT

## 2022-09-08 NOTE — THERAPY TREATMENT NOTE
Outpatient Physical Therapy Ortho Treatment Note  [facility]     Patient Name: Suly Ware  : 1950  MRN: [id number]  Today's Date: 2022      Visit Date: 2022    Visit Dx:    ICD-10-CM ICD-9-CM   1. Chronic bilateral low back pain without sciatica  M54.50 724.2    G89.29 338.29   2. Right hip pain  M25.551 719.45   3. Low back pain, unspecified back pain laterality, unspecified chronicity, unspecified whether sciatica present  M54.50 724.2   4. Cervicalgia  M54.2 723.1   5. Impaired mobility  Z74.09 799.89       Patient Active Problem List   Diagnosis   • Headache, migraine   • DVT (deep venous thrombosis) ([facility])   • Chronic right shoulder pain   • Influenza   • Chronic fatigue   • Family history of breast cancer- paternal aunt age 39, pt is BRCA neg.   • Rosacea   • Osteopenia   • Depression   • Screen for colon cancer   • LGSIL on Pap smear of cervix   • Recurrent vaginitis   • Benign essential microscopic hematuria        Past Medical History:   Diagnosis Date   • Acute serous otitis media    • Acute sinusitis    • Allergic     Pollen, sulfites   • Anxiety uray    • Arthralgia    • Colon polyp    • Depression    • DVT of lower extremity (deep venous thrombosis) (HCC)    • LGSIL on Pap smear of cervix 2019   • Migraine headache    • Osteopenia    • PFO (patent foramen ovale)    • Profound fatigue     when walking upstairs   • Pulmonary embolus (HCC)     from OCPs, thrombophilia w/u neg   • Pulmonary embolus (HCC) 2000    from OCPs, thrombophilia w/u neg   • Shoulder pain         Past Surgical History:   Procedure Laterality Date   • APPENDECTOMY     •  SECTION      x 2   • COLONOSCOPY     • COLONOSCOPY N/A 2019    Procedure: COLONOSCOPY INTO CECUM & TERMINAL ILEUM  WITH COLD SNARE POLYPECTOMY;  Surgeon: Brandon Nielsen MD;  Location: [facility] ENDOSCOPY;  Service: Gastroenterology   • DILATATION AND CURETTAGE     • EXPLORATORY LAPAROTOMY       dermoid removed   • HX OVARIAN CYSTECTOMY     • TUBAL ABDOMINAL LIGATION                          PT Assessment/Plan     Row Name 09/08/22 1159          PT Assessment    Assessment Comments Pt returns for initial follow up after evaluation reporting compliance with HEP and minimal soreness. Pt able to walk this morning 1.5 miles with slight increase in pain toward the end. Initiated manual therapy to cervical spine including suboccipital release and C/S PA mobs with good response. Added several core/hip stability exercises and reviewed current HEP for form.  -CN            PT Plan    PT Plan Comments Assess response to added exercises and continue to progress as tolerated. Consider shoulder ext, HS curl, AR and resisted sidestepping next visit.  -CN           User Key  (r) = Recorded By, (t) = Taken By, (c) = Cosigned By    Initials Name Provider Type    Ludy Nichols, PT Physical Therapist                   OP Exercises     Row Name 09/08/22 1100             Subjective Comments    Subjective Comments I feel ok today. I did my exercises this morning and I went for a 1.5 mile walk. It was starting to hurt a little bit toward the end.  -CN              Subjective Pain    Able to rate subjective pain? yes  -CN      Pre-Treatment Pain Level 0  -CN              Total Minutes    37234 - PT Therapeutic Exercise Minutes 30  -CN      03303 - PT Manual Therapy Minutes 10  -CN              Exercise 1    Exercise Name 1 LTR  -CN      Cueing 1 Verbal;Demo  -CN      Reps 1 10  -CN      Time 1 5s  -CN              Exercise 2    Exercise Name 2 piriformis stretch  -CN      Cueing 2 Verbal;Demo  -CN      Reps 2 3  -CN      Time 2 20s  -CN              Exercise 4    Exercise Name 4 seated HS stretch  -CN      Cueing 4 Verbal;Demo  -CN      Reps 4 3  -CN      Time 4 20s  -CN              Exercise 5    Exercise Name 5 setaed UT/levator scap stretch  -CN      Cueing 5 Verbal;Demo  -CN      Reps 5 3, each  -CN      Time 5  20s  -CN              Exercise 6    Exercise Name 6 doorway stretch  -CN      Cueing 6 Verbal;Demo  -CN      Reps 6 3  -CN      Time 6 20s  -CN              Exercise 8    Exercise Name 8 Nustep L5 UE/LE  -CN      Cueing 8 Verbal;Demo  -CN      Time 8 5 min  -CN              Exercise 9    Exercise Name 9 Chin tuck  -CN      Cueing 9 Verbal;Demo  -CN      Reps 9 10  -CN      Time 9 5 sec  -CN              Exercise 10    Exercise Name 10 PPT  -CN      Cueing 10 Verbal;Demo  -CN      Reps 10 15  -CN      Time 10 5 sec  -CN              Exercise 11    Exercise Name 11 Bridge  -CN      Cueing 11 Verbal;Demo  -CN      Reps 11 15  -CN      Additional Comments with PPT  -CN              Exercise 12    Exercise Name 12 SL clamshell  -CN      Cueing 12 Verbal;Demo  -CN      Reps 12 10 B  -CN      Additional Comments RTB  -CN            User Key  (r) = Recorded By, (t) = Taken By, (c) = Cosigned By    Initials Name Provider Type    Ludy Nichols, JAMES Physical Therapist                         Manual Rx (last 36 hours)     Manual Treatments     Row Name 09/08/22 1100             Total Minutes    53109 - PT Manual Therapy Minutes 10  -CN              Manual Rx 1    Manual Rx 1 Location Suboccipital release, C/S mobs; pt in HLing  -CN            User Key  (r) = Recorded By, (t) = Taken By, (c) = Cosigned By    Initials Name Provider Type    Ludy Nichols, JAMES Physical Therapist                    Therapy Education  Given: HEP, Symptoms/condition management, Pain management, Posture/body mechanics, Mobility training  Program: Reinforced, Progressed  How Provided: Verbal, Demonstration  Provided to: Patient  Level of Understanding: Teach back education performed, Verbalized, Demonstrated              Time Calculation:   Start Time: 1125  Stop Time: 1205  Time Calculation (min): 40 min  Timed Charges  09704 - PT Therapeutic Exercise Minutes: 30  81091 - PT Manual Therapy Minutes: 10  Total Minutes  Timed  Charges Total Minutes: 40   Total Minutes: 40  Therapy Charges for Today     Code Description Service Date Service Provider Modifiers Qty    [id number]  PT THER PROC EA 15 MIN 9/8/2022 Ludy Cope, PT GP 2    [id number]  PT MANUAL THERAPY EA 15 MIN 9/8/2022 Ludy Cope, PT GP 1                    Ludy Cope, PT  9/8/2022

## 2022-09-09 ENCOUNTER — APPOINTMENT (OUTPATIENT)
Dept: MRI IMAGING | Facility: HOSPITAL | Age: 72
End: 2022-09-09

## 2022-09-12 ENCOUNTER — HOSPITAL ENCOUNTER (OUTPATIENT)
Dept: MAMMOGRAPHY | Facility: HOSPITAL | Age: 72
Discharge: HOME | End: 2022-09-12

## 2022-09-12 ENCOUNTER — HOSPITAL ENCOUNTER (OUTPATIENT)
Dept: MRI IMAGING | Facility: HOSPITAL | Age: 72
Discharge: HOME | End: 2022-09-12

## 2022-09-12 DIAGNOSIS — G89.29 CHRONIC BILATERAL LOW BACK PAIN WITHOUT SCIATICA: ICD-10-CM

## 2022-09-12 DIAGNOSIS — M54.50 CHRONIC BILATERAL LOW BACK PAIN WITHOUT SCIATICA: ICD-10-CM

## 2022-09-12 DIAGNOSIS — Z12.31 SCREENING MAMMOGRAM, ENCOUNTER FOR: ICD-10-CM

## 2022-09-12 DIAGNOSIS — M25.551 RIGHT HIP PAIN: ICD-10-CM

## 2022-09-12 PROCEDURE — 77067 SCR MAMMO BI INCL CAD: CPT

## 2022-09-12 PROCEDURE — 77063 BREAST TOMOSYNTHESIS BI: CPT

## 2022-09-12 PROCEDURE — 72148 MRI LUMBAR SPINE W/O DYE: CPT

## 2022-09-13 ENCOUNTER — HOSPITAL ENCOUNTER (OUTPATIENT)
Dept: PHYSICAL THERAPY | Facility: HOSPITAL | Age: 72
Setting detail: THERAPIES SERIES
Discharge: HOME | End: 2022-09-13

## 2022-09-13 DIAGNOSIS — M25.551 RIGHT HIP PAIN: ICD-10-CM

## 2022-09-13 DIAGNOSIS — Z74.09 IMPAIRED MOBILITY: ICD-10-CM

## 2022-09-13 DIAGNOSIS — M54.50 LOW BACK PAIN, UNSPECIFIED BACK PAIN LATERALITY, UNSPECIFIED CHRONICITY, UNSPECIFIED WHETHER SCIATICA PRESENT: ICD-10-CM

## 2022-09-13 DIAGNOSIS — G89.29 CHRONIC BILATERAL LOW BACK PAIN WITHOUT SCIATICA: Primary | ICD-10-CM

## 2022-09-13 DIAGNOSIS — M54.2 CERVICALGIA: ICD-10-CM

## 2022-09-13 DIAGNOSIS — M54.50 CHRONIC BILATERAL LOW BACK PAIN WITHOUT SCIATICA: Primary | ICD-10-CM

## 2022-09-13 PROCEDURE — 97110 THERAPEUTIC EXERCISES: CPT | Performed by: PHYSICAL THERAPIST

## 2022-09-13 PROCEDURE — 97140 MANUAL THERAPY 1/> REGIONS: CPT | Performed by: PHYSICAL THERAPIST

## 2022-09-13 NOTE — THERAPY TREATMENT NOTE
Outpatient Physical Therapy Ortho Treatment Note  [facility]     Patient Name: Suly Ware  : 1950  MRN: [id number]  Today's Date: 2022      Visit Date: 2022    Visit Dx:    ICD-10-CM ICD-9-CM   1. Chronic bilateral low back pain without sciatica  M54.50 724.2    G89.29 338.29   2. Right hip pain  M25.551 719.45   3. Low back pain, unspecified back pain laterality, unspecified chronicity, unspecified whether sciatica present  M54.50 724.2   4. Cervicalgia  M54.2 723.1   5. Impaired mobility  Z74.09 799.89       Patient Active Problem List   Diagnosis   • Headache, migraine   • DVT (deep venous thrombosis) ([facility])   • Chronic right shoulder pain   • Influenza   • Chronic fatigue   • Family history of breast cancer- paternal aunt age 39, pt is BRCA neg.   • Rosacea   • Osteopenia   • Depression   • Screen for colon cancer   • LGSIL on Pap smear of cervix   • Recurrent vaginitis   • Benign essential microscopic hematuria        Past Medical History:   Diagnosis Date   • Acute serous otitis media    • Acute sinusitis    • Allergic     Pollen, sulfites   • Anxiety uray    • Arthralgia    • Colon polyp    • Depression    • DVT of lower extremity (deep venous thrombosis) (HCC)    • LGSIL on Pap smear of cervix 2019   • Migraine headache    • Osteopenia    • PFO (patent foramen ovale)    • Profound fatigue     when walking upstairs   • Pulmonary embolus (HCC)     from OCPs, thrombophilia w/u neg   • Pulmonary embolus (HCC) 2000    from OCPs, thrombophilia w/u neg   • Shoulder pain         Past Surgical History:   Procedure Laterality Date   • APPENDECTOMY     •  SECTION      x 2   • COLONOSCOPY     • COLONOSCOPY N/A 2019    Procedure: COLONOSCOPY INTO CECUM & TERMINAL ILEUM  WITH COLD SNARE POLYPECTOMY;  Surgeon: Brandon Nielsen MD;  Location: [facility] ENDOSCOPY;  Service: Gastroenterology   • DILATATION AND CURETTAGE     • EXPLORATORY LAPAROTOMY       dermoid removed   • HX OVARIAN CYSTECTOMY     • TUBAL ABDOMINAL LIGATION                          PT Assessment/Plan     Row Name 09/13/22 1112          PT Assessment    Assessment Comments Ms. Ware returns reporting good response to the manual techniques to her c spine during the previous session. We progressed her hip girdle/core strengthening activities today, updating HEP accourdingly. Strengthening to be 2-3 x's per week with stretches/ROM daily. We continued to work on C spine manual techniques including distraction, sub occipital release (educated on self sub occipital release with tennis balls), and PA mobs. Educated pt re: use of towel roll to support c spine lordosis while sleeping to see if this helps with her sleeping. Ms. Ware continues to be a good candidate for skilled physical therapy  -GJ            PT Plan    PT Plan Comments assess response to manual techniques to the C spine. may consider c spine isometrics, continue c spine distraction, consider addition of seate vs. standing shoulder HA/D2 to incorporate uppper and lower quarter strengthening  -GJ           User Key  (r) = Recorded By, (t) = Taken By, (c) = Cosigned By    Initials Name Provider Type    Gianni Graves, PT Physical Therapist                   OP Exercises     Row Name 09/13/22 0921 09/13/22 0900          Subjective Comments    Subjective Comments -- what she did for my neck last time really seemed to help. I did wake up this morning at 2 with neck pain.  -GJ            Total Minutes    75531 - PT Therapeutic Exercise Minutes 25  -GJ --     19021 - PT Manual Therapy Minutes 18  -GJ --            Exercise 1    Exercise Name 1 -- LTR  -GJ     Cueing 1 -- Verbal;Demo  -GJ     Reps 1 -- 10  -GJ     Time 1 -- 5s  -GJ            Exercise 2    Exercise Name 2 -- piriformis stretch  -GJ     Cueing 2 -- Verbal;Demo  -GJ     Reps 2 -- 3  -GJ     Time 2 -- 20s  -GJ            Exercise 8    Exercise Name 8 -- Nustep L5 UE/LE  -GJ      Cueing 8 -- Verbal;Demo  -GJ     Time 8 -- 5 min  -GJ            Exercise 9    Exercise Name 9 -- Chin tuck, seated  -GJ     Cueing 9 -- Verbal;Demo  -GJ     Reps 9 -- 10  -GJ     Time 9 -- 5 sec  -GJ            Exercise 12    Exercise Name 12 -- SL clamshell  -GJ     Cueing 12 -- Verbal;Demo  -GJ     Reps 12 -- 2 x 10 B  -GJ     Time 12 -- 3 s  -GJ     Additional Comments -- RTB  -GJ            Exercise 13    Exercise Name 13 -- shoulder ext  -GJ     Cueing 13 -- Verbal;Demo  -GJ     Sets 13 -- 2  -GJ     Reps 13 -- 10  -GJ     Time 13 -- RTB  -GJ            Exercise 14    Exercise Name 14 -- lateral stepping  -GJ     Cueing 14 -- Verbal;Demo  -GJ     Reps 14 -- 3 laps  -GJ     Time 14 -- RTB  -GJ            Exercise 15    Exercise Name 15 -- AR, B  -GJ     Cueing 15 -- Verbal;Demo  -GJ     Sets 15 -- 2  -GJ     Reps 15 -- 10  -GJ     Time 15 -- RTB  -GJ           User Key  (r) = Recorded By, (t) = Taken By, (c) = Cosigned By    Initials Name Provider Type    Gianni Graves, PT Physical Therapist                         Manual Rx (last 36 hours)     Manual Treatments     Row Name 09/13/22 0921 09/13/22 0900          Total Minutes    56285 - PT Manual Therapy Minutes 18  -GJ --            Manual Rx 1    Manual Rx 1 Location -- Suboccipital release, PA mobs C/S mobs; gentle distraction to C spine, pt in HLing  -GJ     Manual Rx 1 Type -- OA nodding stretch  -GJ           User Key  (r) = Recorded By, (t) = Taken By, (c) = Cosigned By    Initials Name Provider Type    Gianni Graves, PT Physical Therapist                 PT OP Goals     Row Name 09/13/22 0900          PT Short Term Goals    STG Date to Achieve 10/07/22  -GJ     STG 1 pt. to be I with initial HEP to facilitate self management of their condition  -GJ     STG 1 Progress Ongoing  -GJ     STG 1 Progress Comments intermittent cuing  -GJ     STG 2 pt. to be educated in/verbalize understanding of the importance of posture/ergonomics in association  with their condition to facilitate self management of their condition  -GJ     STG 2 Progress Ongoing  -GJ     STG 2 Progress Comments reviewed  -GJ     STG 3 pt to report sleeping through the night without interruptions secondary to pain to facilitate optimal restorative sleep  -GJ     STG 3 Progress Ongoing  -GJ     STG 3 Progress Comments pt woke around 2 AM with pain  -GJ            Long Term Goals    LTG Date to Achieve 12/09/22  -GJ     LTG 1 pt. to be I with advanced HEP to facilitate self management of their condition  -GJ     LTG 1 Progress Ongoing  -GJ     LTG 2 pt. to report an Oswestry </= 20% to demonstrate decreased level of perceived disability  -GJ     LTG 2 Progress Ongoing  -GJ     LTG 3 pt. to ascend/descends stairs with reciprocal pattern (</= 1 rails) to facilitate ease/safety of household/community mobility  -GJ     LTG 3 Progress Ongoing  -GJ     LTG 4 pt to demonstrate R hip abd MMT >/=4+/5 to facilitate ease with performing fitness/leisure activities  -GJ     LTG 4 Progress Ongoing  -GJ     LTG 5 pt to report inititing regular fitness activities (pilaties, yoga, etc) to facilitate optimal health  -GJ     LTG 5 Progress Ongoing  -GJ           User Key  (r) = Recorded By, (t) = Taken By, (c) = Cosigned By    Initials Name Provider Type    Gianni Graves, PT Physical Therapist                Therapy Education  Education Details: updated HEP, strengthening to be once every other day, stretches daily. Discussed use of towel roll to support c spine lordosis when sleeping.  Educated in self sub occipital release with tennis balls.  Given: HEP, Symptoms/condition management, Pain management, Posture/body mechanics, Mobility training  Program: Reinforced, New, Progressed  How Provided: Verbal, Demonstration, Other (comment) (updated Zite)  Provided to: Patient  Level of Understanding: Verbalized              Time Calculation:   Start Time: 0915  Stop Time: 1000  Time Calculation (min): 45  min  Timed Charges  80519 - PT Therapeutic Exercise Minutes: 25  75471 - PT Manual Therapy Minutes: 18  Total Minutes  Timed Charges Total Minutes: 43   Total Minutes: 43  Therapy Charges for Today     Code Description Service Date Service Provider Modifiers Qty    92822817233  PT THER PROC EA 15 MIN 9/13/2022 Gianni Carrasco, PT GP 2    [id number]  PT MANUAL THERAPY EA 15 MIN 9/13/2022 Gianni Carrasco, PT GP 1                    Gianni Carrasco, PT  9/13/2022

## 2022-09-15 ENCOUNTER — HOSPITAL ENCOUNTER (OUTPATIENT)
Dept: PHYSICAL THERAPY | Facility: HOSPITAL | Age: 72
Setting detail: THERAPIES SERIES
Discharge: HOME | End: 2022-09-15

## 2022-09-15 DIAGNOSIS — M54.50 CHRONIC BILATERAL LOW BACK PAIN WITHOUT SCIATICA: Primary | ICD-10-CM

## 2022-09-15 DIAGNOSIS — M54.50 LOW BACK PAIN, UNSPECIFIED BACK PAIN LATERALITY, UNSPECIFIED CHRONICITY, UNSPECIFIED WHETHER SCIATICA PRESENT: ICD-10-CM

## 2022-09-15 DIAGNOSIS — Z74.09 IMPAIRED MOBILITY: ICD-10-CM

## 2022-09-15 DIAGNOSIS — G89.29 CHRONIC BILATERAL LOW BACK PAIN WITHOUT SCIATICA: Primary | ICD-10-CM

## 2022-09-15 DIAGNOSIS — M54.2 CERVICALGIA: ICD-10-CM

## 2022-09-15 DIAGNOSIS — M25.551 RIGHT HIP PAIN: ICD-10-CM

## 2022-09-15 PROCEDURE — 97110 THERAPEUTIC EXERCISES: CPT

## 2022-09-15 PROCEDURE — 97140 MANUAL THERAPY 1/> REGIONS: CPT

## 2022-09-15 NOTE — THERAPY TREATMENT NOTE
Outpatient Physical Therapy Ortho Treatment Note  [facility]     Patient Name: Suly Ware  : 1950  MRN: [id number]  Today's Date: 9/15/2022      Visit Date: 09/15/2022    Visit Dx:    ICD-10-CM ICD-9-CM   1. Chronic bilateral low back pain without sciatica  M54.50 724.2    G89.29 338.29   2. Right hip pain  M25.551 719.45   3. Low back pain, unspecified back pain laterality, unspecified chronicity, unspecified whether sciatica present  M54.50 724.2   4. Cervicalgia  M54.2 723.1   5. Impaired mobility  Z74.09 799.89       Patient Active Problem List   Diagnosis   • Headache, migraine   • DVT (deep venous thrombosis) ([facility])   • Chronic right shoulder pain   • Influenza   • Chronic fatigue   • Family history of breast cancer- paternal aunt age 39, pt is BRCA neg.   • Rosacea   • Osteopenia   • Depression   • Screen for colon cancer   • LGSIL on Pap smear of cervix   • Recurrent vaginitis   • Benign essential microscopic hematuria        Past Medical History:   Diagnosis Date   • Acute serous otitis media    • Acute sinusitis    • Allergic     Pollen, sulfites   • Anxiety uray    • Arthralgia    • Colon polyp    • Depression    • DVT of lower extremity (deep venous thrombosis) (HCC)    • LGSIL on Pap smear of cervix 2019   • Migraine headache    • Osteopenia    • PFO (patent foramen ovale)    • Profound fatigue     when walking upstairs   • Pulmonary embolus (HCC)     from OCPs, thrombophilia w/u neg   • Pulmonary embolus (HCC) 2000    from OCPs, thrombophilia w/u neg   • Shoulder pain         Past Surgical History:   Procedure Laterality Date   • APPENDECTOMY     •  SECTION      x 2   • COLONOSCOPY     • COLONOSCOPY N/A 2019    Procedure: COLONOSCOPY INTO CECUM & TERMINAL ILEUM  WITH COLD SNARE POLYPECTOMY;  Surgeon: Brandon Nielsen MD;  Location: [facility] ENDOSCOPY;  Service: Gastroenterology   • DILATATION AND CURETTAGE     • EXPLORATORY LAPAROTOMY       dermoid removed   • HX OVARIAN CYSTECTOMY     • TUBAL ABDOMINAL LIGATION                          PT Assessment/Plan     Row Name 09/15/22 0828          PT Assessment    Assessment Comments Pt reporting 2/10 pain today following soreness due to added exercises last visit. Educated on increased pain vs soreness and pt verbalized understanding. Added cervical isometrics and standing HA today. Discussed TA and chin tuck with standing exercises for improved stability and decreased pain.  -CN            PT Plan    PT Plan Comments Assess response to added exercises and consider star with TB and monster walk next visit.  -CN           User Key  (r) = Recorded By, (t) = Taken By, (c) = Cosigned By    Initials Name Provider Type    Ludy Nichols, PT Physical Therapist                   OP Exercises     Row Name 09/15/22 0700             Subjective Comments    Subjective Comments I  feel really good this morning. I was sore yesterday just from what we did here. It wasn't painful though.  -CN              Subjective Pain    Able to rate subjective pain? yes  -CN              Total Minutes    43414 - PT Therapeutic Exercise Minutes 25  -CN      51388 - PT Manual Therapy Minutes 15  -CN              Exercise 1    Exercise Name 1 LTR  -CN      Cueing 1 Verbal;Demo  -CN      Reps 1 10  -CN      Time 1 5s  -CN              Exercise 2    Exercise Name 2 piriformis stretch  -CN      Cueing 2 Verbal;Demo  -CN      Reps 2 3  -CN      Time 2 20s  -CN              Exercise 3    Exercise Name 3 Seated cervical isometrics  -CN      Cueing 3 Verbal;Demo  -CN      Reps 3 10 ea  -CN      Time 3 5 sec  -CN              Exercise 8    Exercise Name 8 Nustep L5 UE/LE  -CN      Cueing 8 Verbal;Demo  -CN      Time 8 5 min  -CN              Exercise 9    Exercise Name 9 Chin tuck, supine  -CN      Cueing 9 Verbal;Demo  -CN      Reps 9 10  -CN      Time 9 5 sec  -CN              Exercise 10    Exercise Name 10 Standing HA with PPT  and knees unlocked  -CN      Cueing 10 Verbal;Demo  -CN      Sets 10 2  -CN      Reps 10 10  -CN      Additional Comments RTB, cues for chin tuck  -CN              Exercise 12    Exercise Name 12 SL clamshell  -CN      Cueing 12 Verbal;Demo  -CN      Reps 12 2 x 10 B  -CN      Time 12 3 s  -CN      Additional Comments RTB  -CN              Exercise 13    Exercise Name 13 shoulder ext  -CN      Cueing 13 Verbal;Demo  -CN      Sets 13 2  -CN      Reps 13 10  -CN      Time 13 RTB  -CN      Additional Comments on blue foam  -CN              Exercise 14    Exercise Name 14 lateral stepping  -CN      Cueing 14 Verbal;Demo  -CN      Reps 14 3 laps  -CN      Time 14 RTB  -CN              Exercise 15    Exercise Name 15 AR, B  -CN      Cueing 15 Verbal;Demo  -CN      Sets 15 2  -CN      Reps 15 10  -CN      Time 15 RTB  -CN            User Key  (r) = Recorded By, (t) = Taken By, (c) = Cosigned By    Initials Name Provider Type    Ludy Nichols, PT Physical Therapist                         Manual Rx (last 36 hours)     Manual Treatments     Row Name 09/15/22 0700             Total Minutes    43098 - PT Manual Therapy Minutes 15  -CN              Manual Rx 1    Manual Rx 1 Location Suboccipital release, PA mobs C/S mobs; gentle distraction to C spine, pt in HLing  -CN      Manual Rx 1 Type OA nodding stretch  -CN            User Key  (r) = Recorded By, (t) = Taken By, (c) = Cosigned By    Initials Name Provider Type    Ludy Nichols, PT Physical Therapist                    Therapy Education  Given: HEP, Symptoms/condition management, Pain management, Posture/body mechanics, Mobility training  Program: Reinforced, New, Progressed  How Provided: Verbal, Demonstration, Other (comment)  Provided to: Patient  Level of Understanding: Verbalized              Time Calculation:   Start Time: 0745  Stop Time: 0828  Time Calculation (min): 43 min  Timed Charges  50763 - PT Therapeutic Exercise Minutes:  25  35497 - PT Manual Therapy Minutes: 15  Total Minutes  Timed Charges Total Minutes: 40   Total Minutes: 40  Therapy Charges for Today     Code Description Service Date Service Provider Modifiers Qty    [id number]  PT THER PROC EA 15 MIN 9/15/2022 Ludy Cope, PT GP 2    [id number]  PT MANUAL THERAPY EA 15 MIN 9/15/2022 Ludy Cope, PT GP 1                    Ludy Cope, PT  9/15/2022

## 2022-09-20 ENCOUNTER — HOSPITAL ENCOUNTER (OUTPATIENT)
Dept: PHYSICAL THERAPY | Facility: HOSPITAL | Age: 72
Setting detail: THERAPIES SERIES
Discharge: HOME | End: 2022-09-20

## 2022-09-20 DIAGNOSIS — Z74.09 IMPAIRED MOBILITY: ICD-10-CM

## 2022-09-20 DIAGNOSIS — M54.2 CERVICALGIA: ICD-10-CM

## 2022-09-20 DIAGNOSIS — M25.551 RIGHT HIP PAIN: ICD-10-CM

## 2022-09-20 DIAGNOSIS — G89.29 CHRONIC BILATERAL LOW BACK PAIN WITHOUT SCIATICA: Primary | ICD-10-CM

## 2022-09-20 DIAGNOSIS — M51.37 DDD (DEGENERATIVE DISC DISEASE), LUMBOSACRAL: Primary | ICD-10-CM

## 2022-09-20 DIAGNOSIS — M54.50 LOW BACK PAIN, UNSPECIFIED BACK PAIN LATERALITY, UNSPECIFIED CHRONICITY, UNSPECIFIED WHETHER SCIATICA PRESENT: ICD-10-CM

## 2022-09-20 DIAGNOSIS — M54.50 CHRONIC BILATERAL LOW BACK PAIN WITHOUT SCIATICA: Primary | ICD-10-CM

## 2022-09-20 PROCEDURE — 97110 THERAPEUTIC EXERCISES: CPT | Performed by: PHYSICAL THERAPIST

## 2022-09-20 NOTE — THERAPY TREATMENT NOTE
Outpatient Physical Therapy Ortho Treatment Note  [facility]     Patient Name: Suly Ware  : 1950  MRN: [id number]  Today's Date: 2022      Visit Date: 2022    Visit Dx:    ICD-10-CM ICD-9-CM   1. Chronic bilateral low back pain without sciatica  M54.50 724.2    G89.29 338.29   2. Right hip pain  M25.551 719.45   3. Low back pain, unspecified back pain laterality, unspecified chronicity, unspecified whether sciatica present  M54.50 724.2   4. Cervicalgia  M54.2 723.1   5. Impaired mobility  Z74.09 799.89       Patient Active Problem List   Diagnosis   • Headache, migraine   • DVT (deep venous thrombosis) ([facility])   • Chronic right shoulder pain   • Influenza   • Chronic fatigue   • Family history of breast cancer- paternal aunt age 39, pt is BRCA neg.   • Rosacea   • Osteopenia   • Depression   • Screen for colon cancer   • LGSIL on Pap smear of cervix   • Recurrent vaginitis   • Benign essential microscopic hematuria        Past Medical History:   Diagnosis Date   • Acute serous otitis media    • Acute sinusitis    • Allergic     Pollen, sulfites   • Anxiety uray    • Arthralgia    • Colon polyp    • Depression    • DVT of lower extremity (deep venous thrombosis) (HCC)    • LGSIL on Pap smear of cervix 2019   • Migraine headache    • Osteopenia    • PFO (patent foramen ovale)    • Profound fatigue     when walking upstairs   • Pulmonary embolus (HCC)     from OCPs, thrombophilia w/u neg   • Pulmonary embolus (HCC) 2000    from OCPs, thrombophilia w/u neg   • Shoulder pain         Past Surgical History:   Procedure Laterality Date   • APPENDECTOMY     •  SECTION      x 2   • COLONOSCOPY     • COLONOSCOPY N/A 2019    Procedure: COLONOSCOPY INTO CECUM & TERMINAL ILEUM  WITH COLD SNARE POLYPECTOMY;  Surgeon: Brandon Nielsen MD;  Location: [facility] ENDOSCOPY;  Service: Gastroenterology   • DILATATION AND CURETTAGE     • EXPLORATORY LAPAROTOMY       dermoid removed   • HX OVARIAN CYSTECTOMY     • TUBAL ABDOMINAL LIGATION                          PT Assessment/Plan     Row Name 09/20/22 1306          PT Assessment    Assessment Comments Ms. Ware returns, reporting decreasing cervical spine pain, improving sleep habits and good adherence to HEP.  She is doing better with volitional TA contraction without cuing during functional activities.  We progressed her hip girdle/core/posterior chain strengthening today, updating HEP accourdingly. She does report some R shoulder pain which appears to be impingement like in nature ((-) empty can, full can, (-) pain with ER R warnerler MMT, 4+5, (+) Bety Ash).  Ms. Ware is to continue strengthening once every other day. Ms. Ware continues to be a good candidate for skilled physical therapy.  -GJ            PT Plan    PT Plan Comments continue to work on hip girdle/core strength. Consider adding D2 to standing HA  -GJ           User Key  (r) = Recorded By, (t) = Taken By, (c) = Cosigned By    Initials Name Provider Type    Gianni Graves, PT Physical Therapist                   OP Exercises     Row Name 09/20/22 0922 09/20/22 0900          Subjective Comments    Subjective Comments -- i'm a little worried, my shoulder is starting to bother me. I feel a whole lot better after I do my exercises.  I am not having any low back pain now.  -GJ            Total Minutes    20584 - PT Therapeutic Exercise Minutes 40  -GJ --            Exercise 1    Exercise Name 1 -- LTR  -GJ     Cueing 1 -- Verbal;Demo  -GJ     Reps 1 -- 10  -GJ     Time 1 -- 5s  -GJ            Exercise 2    Exercise Name 2 -- piriformis stretch  -GJ     Cueing 2 -- Verbal;Demo  -GJ     Reps 2 -- 3  -GJ     Time 2 -- 20s  -GJ            Exercise 3    Exercise Name 3 -- Seated cervical isometrics  -GJ     Cueing 3 -- Verbal;Demo  -GJ     Reps 3 -- 10 ea  -GJ     Time 3 -- 5 sec  -GJ     Additional Comments -- theapist facilitated resistance  -GJ             Exercise 8    Exercise Name 8 -- Nustep L5 UE/LE  -GJ     Cueing 8 -- Verbal;Demo  -GJ     Time 8 -- 5 min  -GJ            Exercise 9    Exercise Name 9 -- seated chin tucks  -GJ     Cueing 9 -- Verbal;Demo  -GJ     Reps 9 -- 10  -GJ     Time 9 -- 5s  -GJ            Exercise 10    Exercise Name 10 -- Standing HA with PPT and knees unlocked  -GJ     Cueing 10 -- Verbal;Demo  -GJ     Sets 10 -- 2  -GJ     Reps 10 -- 10  -GJ     Additional Comments -- RTB, cues for chin tuck  -GJ            Exercise 12    Exercise Name 12 -- SL clamshell  -GJ     Cueing 12 -- Verbal;Demo  -GJ     Reps 12 -- 2 x 10 B  -GJ     Time 12 -- 3 s  -GJ     Additional Comments -- RTB  -GJ            Exercise 13    Exercise Name 13 -- shoulder ext  -GJ     Cueing 13 -- Verbal;Demo  -GJ     Sets 13 -- 2  -GJ     Reps 13 -- 10  -GJ     Time 13 -- RTB  -GJ     Additional Comments -- blue foam  -GJ            Exercise 14    Exercise Name 14 -- lateral stepping  -GJ     Cueing 14 -- Verbal;Demo  -GJ     Reps 14 -- 3 laps  -GJ     Time 14 -- RTB  -GJ            Exercise 15    Exercise Name 15 -- AR, B  -GJ     Cueing 15 -- Verbal;Demo  -GJ     Sets 15 -- 2  -GJ     Reps 15 -- 10  -GJ     Time 15 -- RTB  -GJ            Exercise 16    Exercise Name 16 -- mosntere walk, F/B  -GJ     Cueing 16 -- Verbal;Demo  -GJ     Reps 16 -- 3 laps  -GJ     Time 16 -- RTB  -GJ            Exercise 17    Exercise Name 17 -- standing TA, bilaterl shoulder ER  -GJ     Cueing 17 -- Verbal;Demo  -GJ     Sets 17 -- 2  -GJ     Reps 17 -- 10  -GJ     Time 17 -- RTB  -GJ           User Key  (r) = Recorded By, (t) = Taken By, (c) = Cosigned By    Initials Name Provider Type    Gianni Graves, PT Physical Therapist                              PT OP Goals     Row Name 09/20/22 0900          PT Short Term Goals    STG Date to Achieve 10/07/22  -GJ     STG 1 pt. to be I with initial HEP to facilitate self management of their condition  -GJ     STG 1 Progress Met  -GJ      STG 2 pt. to be educated in/verbalize understanding of the importance of posture/ergonomics in association with their condition to facilitate self management of their condition  -GJ     STG 2 Progress Met  -GJ     STG 3 pt to report sleeping through the night without interruptions secondary to pain to facilitate optimal restorative sleep  -GJ     STG 3 Progress Ongoing;Partially Met  -GJ     STG 3 Progress Comments pt reports much improved sleep pattern  -GJ            Long Term Goals    LTG Date to Achieve 12/09/22  -GJ     LTG 1 pt. to be I with advanced HEP to facilitate self management of their condition  -GJ     LTG 1 Progress Ongoing  -GJ     LTG 2 pt. to report an Oswestry </= 20% to demonstrate decreased level of perceived disability  -GJ     LTG 2 Progress Ongoing  -GJ     LTG 3 pt. to ascend/descends stairs with reciprocal pattern (</= 1 rails) to facilitate ease/safety of household/community mobility  -GJ     LTG 3 Progress Ongoing  -GJ     LTG 4 pt to demonstrate R hip abd MMT >/=4+/5 to facilitate ease with performing fitness/leisure activities  -GJ     LTG 4 Progress Ongoing  -GJ     LTG 5 pt to report inititing regular fitness activities (pilaties, yoga, etc) to facilitate optimal health  -GJ     LTG 5 Progress Ongoing  -GJ           User Key  (r) = Recorded By, (t) = Taken By, (c) = Cosigned By    Initials Name Provider Type    Gianni Graves, PT Physical Therapist                Therapy Education  Education Details: strengthening to be only once every other day, offered modifications to several exercises, discussed yoga practice  Given: HEP, Symptoms/condition management, Pain management, Posture/body mechanics, Mobility training  Program: New, Reinforced, Progressed  How Provided: Verbal, Demonstration  Provided to: Patient  Level of Understanding: Teach back education performed, Verbalized, Demonstrated              Time Calculation:   Start Time: 0918  Stop Time: 1000  Time Calculation  (min): 42 min  Timed Charges  06600 - PT Therapeutic Exercise Minutes: 40  Total Minutes  Timed Charges Total Minutes: 40   Total Minutes: 40  Therapy Charges for Today     Code Description Service Date Service Provider Modifiers Qty    [id number]  PT THER PROC EA 15 MIN 9/20/2022 Gianni Carrasco, PT GP 3                    Gianni Carrasco, PT  9/20/2022

## 2022-09-22 ENCOUNTER — HOSPITAL ENCOUNTER (OUTPATIENT)
Dept: PHYSICAL THERAPY | Facility: HOSPITAL | Age: 72
Setting detail: THERAPIES SERIES
Discharge: HOME | End: 2022-09-22

## 2022-09-22 DIAGNOSIS — G89.29 CHRONIC BILATERAL LOW BACK PAIN WITHOUT SCIATICA: Primary | ICD-10-CM

## 2022-09-22 DIAGNOSIS — Z74.09 IMPAIRED MOBILITY: ICD-10-CM

## 2022-09-22 DIAGNOSIS — M54.2 CERVICALGIA: ICD-10-CM

## 2022-09-22 DIAGNOSIS — M54.50 LOW BACK PAIN, UNSPECIFIED BACK PAIN LATERALITY, UNSPECIFIED CHRONICITY, UNSPECIFIED WHETHER SCIATICA PRESENT: ICD-10-CM

## 2022-09-22 DIAGNOSIS — M25.551 RIGHT HIP PAIN: ICD-10-CM

## 2022-09-22 DIAGNOSIS — M54.50 CHRONIC BILATERAL LOW BACK PAIN WITHOUT SCIATICA: Primary | ICD-10-CM

## 2022-09-22 PROCEDURE — 97140 MANUAL THERAPY 1/> REGIONS: CPT | Performed by: PHYSICAL THERAPIST

## 2022-09-22 PROCEDURE — 97110 THERAPEUTIC EXERCISES: CPT | Performed by: PHYSICAL THERAPIST

## 2022-09-22 NOTE — THERAPY TREATMENT NOTE
Outpatient Physical Therapy Ortho Treatment Note  [facility]     Patient Name: Suly Ware  : 1950  MRN: [id number]  Today's Date: 2022      Visit Date: 2022    Visit Dx:    ICD-10-CM ICD-9-CM   1. Chronic bilateral low back pain without sciatica  M54.50 724.2    G89.29 338.29   2. Right hip pain  M25.551 719.45   3. Low back pain, unspecified back pain laterality, unspecified chronicity, unspecified whether sciatica present  M54.50 724.2   4. Cervicalgia  M54.2 723.1   5. Impaired mobility  Z74.09 799.89       Patient Active Problem List   Diagnosis   • Headache, migraine   • DVT (deep venous thrombosis) ([facility])   • Chronic right shoulder pain   • Influenza   • Chronic fatigue   • Family history of breast cancer- paternal aunt age 39, pt is BRCA neg.   • Rosacea   • Osteopenia   • Depression   • Screen for colon cancer   • LGSIL on Pap smear of cervix   • Recurrent vaginitis   • Benign essential microscopic hematuria        Past Medical History:   Diagnosis Date   • Acute serous otitis media    • Acute sinusitis    • Allergic     Pollen, sulfites   • Anxiety uray    • Arthralgia    • Colon polyp    • Depression    • DVT of lower extremity (deep venous thrombosis) (HCC)    • LGSIL on Pap smear of cervix 2019   • Migraine headache    • Osteopenia    • PFO (patent foramen ovale)    • Profound fatigue     when walking upstairs   • Pulmonary embolus (HCC)     from OCPs, thrombophilia w/u neg   • Pulmonary embolus (HCC) 2000    from OCPs, thrombophilia w/u neg   • Shoulder pain         Past Surgical History:   Procedure Laterality Date   • APPENDECTOMY     •  SECTION      x 2   • COLONOSCOPY     • COLONOSCOPY N/A 2019    Procedure: COLONOSCOPY INTO CECUM & TERMINAL ILEUM  WITH COLD SNARE POLYPECTOMY;  Surgeon: Brandon Nielsen MD;  Location: [facility] ENDOSCOPY;  Service: Gastroenterology   • DILATATION AND CURETTAGE     • EXPLORATORY LAPAROTOMY       dermoid removed   • HX OVARIAN CYSTECTOMY     • TUBAL ABDOMINAL LIGATION                          PT Assessment/Plan     Row Name 09/22/22 0756          PT Assessment    Assessment Comments Ms. Ware returns reporting intermittent sleep disturbance secondary to c spine pain (however better then before). She reports being able to walk 1.5 miles without LBP. Reports taking Ibuprofen only prn now, vs every 4 hours as she had been doing before. Today, we focused primarily on manual mobs of c spine tissue, manual stretching of bilateral UT/levator and manual c spine distraction. She reports feeling good following manual traction, may consider mechanical in the next several visits. Instructed in use of theracane and encoruaged pt to consider regular massges.  Ms. Ware continues to be a good candidate for skilled physical therapy.  -GJ            PT Plan    PT Plan Comments assess response to manual work and weekend away. May consider c spine distraction next session. Continue to work on overall core strength/posterior chain.  Consider adding D2 to HA  -GJ           User Key  (r) = Recorded By, (t) = Taken By, (c) = Cosigned By    Initials Name Provider Type    Gianni Graves, PT Physical Therapist                   OP Exercises     Row Name 09/22/22 0747 09/22/22 0700          Subjective Comments    Subjective Comments -- I'm just sore. I woke up at 3 this morning and had to try to get comfortable with my neck.  That being said, my neck is much better then it was.  -GJ            Total Minutes    43113 - PT Therapeutic Exercise Minutes 13  -GJ --     84116 - PT Manual Therapy Minutes 28  -GJ --            Exercise 1    Exercise Name 1 -- LTR  -GJ     Cueing 1 -- Verbal;Demo  -GJ     Reps 1 -- 10  -GJ     Time 1 -- 5s  -GJ            Exercise 2    Exercise Name 2 -- piriformis stretch  -GJ     Cueing 2 -- Verbal;Demo  -GJ     Reps 2 -- 3  -GJ     Time 2 -- 20s  -GJ            Exercise 5    Exercise Name 5 --  setaed UT/levator scap stretch  -GJ     Cueing 5 -- Verbal;Demo  -GJ     Reps 5 -- 3  -GJ     Time 5 -- 20 s  -GJ            Exercise 8    Exercise Name 8 -- Nustep L5 UE/LE  -GJ     Time 8 -- 5 min  -GJ            Exercise 18    Exercise Name 18 -- demonstrated theracane use  -GJ     Time 18 -- 4 min  -GJ           User Key  (r) = Recorded By, (t) = Taken By, (c) = Cosigned By    Initials Name Provider Type    Gianni Graves, PT Physical Therapist                         Manual Rx (last 36 hours)     Manual Treatments     Row Name 09/22/22 0747 09/22/22 0700          Total Minutes    76883 - PT Manual Therapy Minutes 28  -GJ --            Manual Rx 1    Manual Rx 1 Location -- Suboccipital release, PA mobs C/S mobs; gentle distraction to C spine, pt in HLing  -GJ     Manual Rx 1 Type -- OA nodding stretch  -GJ     Manual Rx 1 Grade -- stretch of R/L UT, levator scap tissues  -GJ           User Key  (r) = Recorded By, (t) = Taken By, (c) = Cosigned By    Initials Name Provider Type    Gianni Graves, PT Physical Therapist                 PT OP Goals     Row Name 09/22/22 0700          PT Short Term Goals    STG Date to Achieve 10/07/22  -GJ     STG 1 pt. to be I with initial HEP to facilitate self management of their condition  -GJ     STG 1 Progress Met  -GJ     STG 2 pt. to be educated in/verbalize understanding of the importance of posture/ergonomics in association with their condition to facilitate self management of their condition  -GJ     STG 2 Progress Met  -GJ     STG 3 pt to report sleeping through the night without interruptions secondary to pain to facilitate optimal restorative sleep  -GJ     STG 3 Progress Ongoing;Partially Met  -GJ            Long Term Goals    LTG Date to Achieve 12/09/22  -GJ     LTG 1 pt. to be I with advanced HEP to facilitate self management of their condition  -GJ     LTG 1 Progress Ongoing  -GJ     LTG 2 pt. to report an Oswestry </= 20% to demonstrate decreased level  of perceived disability  -GJ     LTG 2 Progress Ongoing  -GJ     LTG 3 pt. to ascend/descends stairs with reciprocal pattern (</= 1 rails) to facilitate ease/safety of household/community mobility  -GJ     LTG 3 Progress Ongoing  -GJ     LTG 4 pt to demonstrate R hip abd MMT >/=4+/5 to facilitate ease with performing fitness/leisure activities  -GJ     LTG 4 Progress Ongoing  -GJ     LTG 5 pt to report inititing regular fitness activities (pilaties, yoga, etc) to facilitate optimal health  -     LTG 5 Progress Ongoing  -GJ           User Key  (r) = Recorded By, (t) = Taken By, (c) = Cosigned By    Initials Name Provider Type     Gianni Carrasco, PT Physical Therapist                Therapy Education  Education Details: encouraged pt to consider regular massages. demonstrated use of a theracane for home  Given: HEP, Symptoms/condition management, Pain management, Mobility training, Posture/body mechanics  Program: Reinforced  How Provided: Verbal, Demonstration  Provided to: Patient  Level of Understanding: Teach back education performed, Verbalized, Demonstrated              Time Calculation:   Start Time: 0747  Stop Time: 0828  Time Calculation (min): 41 min  Timed Charges  57671 - PT Therapeutic Exercise Minutes: 13  29787 - PT Manual Therapy Minutes: 28  Total Minutes  Timed Charges Total Minutes: 41   Total Minutes: 41  Therapy Charges for Today     Code Description Service Date Service Provider Modifiers Qty    99768513633 HC PT THER PROC EA 15 MIN 9/22/2022 Gianni Carrasco, PT GP 1    01977133313 HC PT MANUAL THERAPY EA 15 MIN 9/22/2022 Gianni Carrasco, PT GP 2                    Gianni Carrasco PT  9/22/2022

## 2022-09-26 ENCOUNTER — HOSPITAL ENCOUNTER (OUTPATIENT)
Dept: PHYSICAL THERAPY | Facility: HOSPITAL | Age: 72
Setting detail: THERAPIES SERIES
Discharge: HOME | End: 2022-09-26

## 2022-09-26 DIAGNOSIS — M54.2 CERVICALGIA: ICD-10-CM

## 2022-09-26 DIAGNOSIS — M25.551 RIGHT HIP PAIN: ICD-10-CM

## 2022-09-26 DIAGNOSIS — M54.50 LOW BACK PAIN, UNSPECIFIED BACK PAIN LATERALITY, UNSPECIFIED CHRONICITY, UNSPECIFIED WHETHER SCIATICA PRESENT: ICD-10-CM

## 2022-09-26 DIAGNOSIS — Z74.09 IMPAIRED MOBILITY: ICD-10-CM

## 2022-09-26 DIAGNOSIS — G89.29 CHRONIC BILATERAL LOW BACK PAIN WITHOUT SCIATICA: Primary | ICD-10-CM

## 2022-09-26 DIAGNOSIS — M54.50 CHRONIC BILATERAL LOW BACK PAIN WITHOUT SCIATICA: Primary | ICD-10-CM

## 2022-09-26 PROCEDURE — 97110 THERAPEUTIC EXERCISES: CPT

## 2022-09-26 PROCEDURE — 97140 MANUAL THERAPY 1/> REGIONS: CPT

## 2022-09-26 NOTE — THERAPY TREATMENT NOTE
Outpatient Physical Therapy Ortho Treatment Note  [facility]     Patient Name: Suly Ware  : 1950  MRN: [id number]  Today's Date: 2022      Visit Date: 2022    Visit Dx:    ICD-10-CM ICD-9-CM   1. Chronic bilateral low back pain without sciatica  M54.50 724.2    G89.29 338.29   2. Right hip pain  M25.551 719.45   3. Low back pain, unspecified back pain laterality, unspecified chronicity, unspecified whether sciatica present  M54.50 724.2   4. Cervicalgia  M54.2 723.1   5. Impaired mobility  Z74.09 799.89       Patient Active Problem List   Diagnosis   • Headache, migraine   • DVT (deep venous thrombosis) ([facility])   • Chronic right shoulder pain   • Influenza   • Chronic fatigue   • Family history of breast cancer- paternal aunt age 39, pt is BRCA neg.   • Rosacea   • Osteopenia   • Depression   • Screen for colon cancer   • LGSIL on Pap smear of cervix   • Recurrent vaginitis   • Benign essential microscopic hematuria        Past Medical History:   Diagnosis Date   • Acute serous otitis media    • Acute sinusitis    • Allergic     Pollen, sulfites   • Anxiety uray    • Arthralgia    • Colon polyp    • Depression    • DVT of lower extremity (deep venous thrombosis) (HCC)    • LGSIL on Pap smear of cervix 2019   • Migraine headache    • Osteopenia    • PFO (patent foramen ovale)    • Profound fatigue     when walking upstairs   • Pulmonary embolus (HCC)     from OCPs, thrombophilia w/u neg   • Pulmonary embolus (HCC) 2000    from OCPs, thrombophilia w/u neg   • Shoulder pain         Past Surgical History:   Procedure Laterality Date   • APPENDECTOMY     •  SECTION      x 2   • COLONOSCOPY     • COLONOSCOPY N/A 2019    Procedure: COLONOSCOPY INTO CECUM & TERMINAL ILEUM  WITH COLD SNARE POLYPECTOMY;  Surgeon: Brandon Nielsen MD;  Location: [facility] ENDOSCOPY;  Service: Gastroenterology   • DILATATION AND CURETTAGE     • EXPLORATORY LAPAROTOMY    "   dermoid removed   • HX OVARIAN CYSTECTOMY     • TUBAL ABDOMINAL LIGATION                          PT Assessment/Plan     Row Name 09/26/22 1259          PT Assessment    Assessment Comments Ms. Ware has been seen 7 times, initially for lower back pain, but also having some neck pain in more recent visits.  Pt today rates LBP at 0/10, and states she was able to participate in an sporting activity called \"atlattle\" on Sat. which is a throwing activity, with no increase in lower back pain or neck pain.  Today she was able to complete exercises, using very good form and with no increase or provocation of pain. Some weakness of core was noted.  Pt had 75+ % of cerv range, but still has some tension in B upper traps, along with some tenderness of Levator mm.  -LP     Please refer to paper survey for additional self-reported information Yes  -LP     Rehab Potential Good  -LP     Patient/caregiver participated in establishment of treatment plan and goals Yes  -LP     Patient would benefit from skilled therapy intervention Yes  -LP            PT Plan    PT Frequency 2x/week  -LP     PT Plan Comments Continue with core strengthening, and treatment to neck as needed.  Pt is interested in over the door trction.  -LP           User Key  (r) = Recorded By, (t) = Taken By, (c) = Cosigned By    Initials Name Provider Type    LP Susannah Simmons, PT Physical Therapist                   OP Exercises     Row Name 09/26/22 1100 09/26/22 1000          Subjective Comments    Subjective Comments -- I participated in an atlatle competition this weekend and felt pretty good. Did not seem to bother my neck and I slept quite well  -LP            Subjective Pain    Able to rate subjective pain? -- yes  -LP     Pre-Treatment Pain Level -- 0  lower back  -LP            Total Minutes    03187 - PT Therapeutic Exercise Minutes -- 35  -LP     77482 - PT Manual Therapy Minutes 10  -LP --            Exercise 1    Exercise Name 1 -- LTR  -LP  "    Cueing 1 -- Verbal;Demo  -LP     Sets 1 -- 1  -LP     Reps 1 -- 10  -LP     Time 1 -- 5s  -LP            Exercise 2    Exercise Name 2 -- piriformis stretch  -LP     Cueing 2 -- Verbal;Demo  -LP     Sets 2 -- 1  -LP     Reps 2 -- 3  -LP     Time 2 -- 20s  -LP            Exercise 5    Exercise Name 5 -- setaed UT/levator scap stretch  -LP     Cueing 5 -- Verbal;Demo  -LP     Reps 5 -- 3  -LP     Time 5 -- 20 s  -LP            Exercise 8    Exercise Name 8 -- Nustep L5 UE/LE  -LP     Cueing 8 -- Verbal;Demo  -LP     Time 8 -- 5 min  -LP     Additional Comments -- seaat at 6  -LP            Exercise 9    Exercise Name 9 -- open book stretch  -LP     Cueing 9 -- Verbal;Demo  -LP     Sets 9 -- 1  -LP     Reps 9 -- 5  -LP     Time 9 -- 10s  -LP     Additional Comments -- B  -LP            Exercise 12    Exercise Name 12 -- SL clamshells  -LP     Cueing 12 -- Verbal;Demo  -LP     Reps 12 -- 2 x 10 B  -LP     Additional Comments -- RTB  -LP            Exercise 13    Exercise Name 13 -- HL hip add  -LP     Cueing 13 -- Verbal;Tactile;Demo  -LP     Sets 13 -- 1  -LP     Reps 13 -- 10  -LP     Additional Comments -- with ball cuing for T-A recruitment  -LP            Exercise 14    Exercise Name 14 -- HL hip abd  -LP     Cueing 14 -- Verbal;Tactile;Demo  -LP     Sets 14 -- 1  -LP     Reps 14 -- 10  -LP     Additional Comments -- RTB cuing for T-A recruitment  -LP            Exercise 15    Exercise Name 15 -- Lateral stepping  -LP     Cueing 15 -- Verbal;Demo  -LP     Reps 15 -- 4 laps  -LP     Additional Comments -- RTB, slight knee flex  -LP            Exercise 16    Exercise Name 16 -- monster walk  -LP     Cueing 16 -- Verbal;Tactile;Demo  -LP     Reps 16 -- 3 laps FW/BW  -LP     Additional Comments -- RTB  -LP            Exercise 17    Exercise Name 17 -- Shldr ext  -LP     Cueing 17 -- Verbal;Tactile;Demo  -LP     Sets 17 -- 2  -LP     Reps 17 -- 10  -LP     Additional Comments -- RTB, cuing for T-A  -LP             Exercise 18    Exercise Name 18 -- standing T-A with B Shldr ER  -LP     Cueing 18 -- Verbal;Tactile;Demo  -LP     Sets 18 -- 1  -LP     Reps 18 -- 10  -LP     Additional Comments -- RTB  -LP           User Key  (r) = Recorded By, (t) = Taken By, (c) = Cosigned By    Initials Name Provider Type    LP Susannah Simmons, PT Physical Therapist                         Manual Rx (last 36 hours)     Manual Treatments     Row Name 09/26/22 1100             Total Minutes    32298 - PT Manual Therapy Minutes 10  -LP              Manual Rx 1    Manual Rx 1 Location Cerv. In HL  -LP      Manual Rx 1 Type Suboccipital release, PA mobs; gentle distraction to C spine, pt in HLing  -LP      Manual Rx 1 Grade gentle  -LP      Manual Rx 1 Duration 10  -LP            User Key  (r) = Recorded By, (t) = Taken By, (c) = Cosigned By    Initials Name Provider Type    LP Susannah Simmons PT Physical Therapist                    Therapy Education  Education Details: talked about over the door traction, and also DDN  Given: HEP, Symptoms/condition management  Program: Reinforced, Progressed, New  How Provided: Verbal, Demonstration, Written  Provided to: Patient  Level of Understanding: Teach back education performed              Time Calculation:   Start Time: 1045  Stop Time: 1130  Time Calculation (min): 45 min  Timed Charges  15904 - PT Therapeutic Exercise Minutes: 35  18916 - PT Manual Therapy Minutes: 10  Total Minutes  Timed Charges Total Minutes: 10   Total Minutes: 10  Therapy Charges for Today     Code Description Service Date Service Provider Modifiers Qty    06289929505  PT THER PROC EA 15 MIN 9/26/2022 Susannah Simmons PT GP 2    92042922702 HC PT MANUAL THERAPY EA 15 MIN 9/26/2022 Susannah Simmons PT GP 1                    Susannah Simmons PT  9/26/2022

## 2022-10-03 ENCOUNTER — HOSPITAL ENCOUNTER (OUTPATIENT)
Dept: PHYSICAL THERAPY | Facility: HOSPITAL | Age: 72
Setting detail: THERAPIES SERIES
Discharge: HOME | End: 2022-10-03

## 2022-10-03 DIAGNOSIS — M25.551 RIGHT HIP PAIN: ICD-10-CM

## 2022-10-03 DIAGNOSIS — Z74.09 IMPAIRED MOBILITY: ICD-10-CM

## 2022-10-03 DIAGNOSIS — M54.2 CERVICALGIA: ICD-10-CM

## 2022-10-03 DIAGNOSIS — M54.50 LOW BACK PAIN, UNSPECIFIED BACK PAIN LATERALITY, UNSPECIFIED CHRONICITY, UNSPECIFIED WHETHER SCIATICA PRESENT: ICD-10-CM

## 2022-10-03 DIAGNOSIS — M54.50 CHRONIC BILATERAL LOW BACK PAIN WITHOUT SCIATICA: Primary | ICD-10-CM

## 2022-10-03 DIAGNOSIS — G89.29 CHRONIC BILATERAL LOW BACK PAIN WITHOUT SCIATICA: Primary | ICD-10-CM

## 2022-10-03 PROCEDURE — 97140 MANUAL THERAPY 1/> REGIONS: CPT

## 2022-10-03 PROCEDURE — 97110 THERAPEUTIC EXERCISES: CPT

## 2022-10-03 NOTE — THERAPY TREATMENT NOTE
Outpatient Physical Therapy Ortho Treatment Note  [facility]     Patient Name: Suly Ware  : 1950  MRN: [id number]  Today's Date: 10/3/2022      Visit Date: 10/03/2022    Visit Dx:    ICD-10-CM ICD-9-CM   1. Chronic bilateral low back pain without sciatica  M54.50 724.2    G89.29 338.29   2. Right hip pain  M25.551 719.45   3. Low back pain, unspecified back pain laterality, unspecified chronicity, unspecified whether sciatica present  M54.50 724.2   4. Cervicalgia  M54.2 723.1   5. Impaired mobility  Z74.09 799.89       Patient Active Problem List   Diagnosis   • Headache, migraine   • DVT (deep venous thrombosis) ([facility])   • Chronic right shoulder pain   • Influenza   • Chronic fatigue   • Family history of breast cancer- paternal aunt age 39, pt is BRCA neg.   • Rosacea   • Osteopenia   • Depression   • Screen for colon cancer   • LGSIL on Pap smear of cervix   • Recurrent vaginitis   • Benign essential microscopic hematuria        Past Medical History:   Diagnosis Date   • Acute serous otitis media    • Acute sinusitis    • Allergic     Pollen, sulfites   • Anxiety uray    • Arthralgia    • Colon polyp    • Depression    • DVT of lower extremity (deep venous thrombosis) (HCC)    • LGSIL on Pap smear of cervix 2019   • Migraine headache    • Osteopenia    • PFO (patent foramen ovale)    • Profound fatigue     when walking upstairs   • Pulmonary embolus (HCC)     from OCPs, thrombophilia w/u neg   • Pulmonary embolus (HCC) 2000    from OCPs, thrombophilia w/u neg   • Shoulder pain         Past Surgical History:   Procedure Laterality Date   • APPENDECTOMY     •  SECTION      x 2   • COLONOSCOPY     • COLONOSCOPY N/A 2019    Procedure: COLONOSCOPY INTO CECUM & TERMINAL ILEUM  WITH COLD SNARE POLYPECTOMY;  Surgeon: Brandon Nielsen MD;  Location: [facility] ENDOSCOPY;  Service: Gastroenterology   • DILATATION AND CURETTAGE     • EXPLORATORY LAPAROTOMY       dermoid removed   • HX OVARIAN CYSTECTOMY     • TUBAL ABDOMINAL LIGATION                          PT Assessment/Plan     Row Name 10/03/22 1012          PT Assessment    Assessment Comments Pt reports stiffness in lumbar spine, however no pain over past several weeks. Pt has not returned to full recreational tasks including walking hills and increased walking distance and will self progress over next several weeks, monitoring for symptoms. Pt is compliant with HEP for lumbar spine and D/C to HEP at this time provided symptoms remain low. Treatment spent on cervical/shoulder pain today as she reports R sided post shoulder/upper arm up. Symptoms occur with shoulder flex and improve with cues for scap retraction during passive stretch. Performed several exercises with noodle on wall with cues for chin tuck and scap squeeze throughout. Pt reports mild pain with first 1-3 reps which dissipates by end of set. Plan to continue with postural/scap strengthening and monitor cervical shoulder pain with turning in bed and horizontal adduction.  -CN            PT Plan    PT Plan Comments Assess response to added scapular strengthening and may consider standing open book and standing star next visit.  -CN           User Key  (r) = Recorded By, (t) = Taken By, (c) = Cosigned By    Initials Name Provider Type    CN Ludy Cope, PT Physical Therapist                   OP Exercises     Row Name 10/03/22 0900             Subjective Comments    Subjective Comments My back has been feeling really good. It is a little stiff, but I am not having any pain. My neck and shoulder are better but the pain is still ther with certain movements.  -CN              Subjective Pain    Able to rate subjective pain? yes  -CN      Pre-Treatment Pain Level 0  -CN              Total Minutes    13481 - PT Therapeutic Exercise Minutes 30  -CN      56868 - PT Manual Therapy Minutes 10  -CN              Exercise 1    Exercise Name 1 LTR  -CN       Additional Comments D/C HEP  -CN              Exercise 2    Exercise Name 2 piriformis stretch  -CN      Additional Comments D/C HEP  -CN              Exercise 3    Exercise Name 3 Quadruped chin tuck  -CN      Cueing 3 Verbal;Demo  -CN      Reps 3 10  -CN      Time 3 5 sec  -CN      Additional Comments cues to push through hands for scap/SA engagement  -CN              Exercise 4    Exercise Name 4 HA/ER with noodle on wall  -CN      Cueing 4 Verbal;Demo  -CN      Reps 4 10 ea  -CN      Additional Comments RTB with chin tuck  -CN              Exercise 5    Exercise Name 5 seated UT/levator scap stretch  -CN      Cueing 5 Verbal;Demo  -CN      Reps 5 3  -CN      Time 5 20 s  -CN              Exercise 6    Exercise Name 6 Wall angels with noodle  -CN      Cueing 6 Verbal;Demo  -CN      Reps 6 10  -CN      Additional Comments slight post shoulder pain with first 2 reps, none following  -CN              Exercise 7    Exercise Name 7 B UE flex with noodle on wall  -CN      Cueing 7 Verbal;Demo  -CN      Reps 7 10  -CN      Time 7 RTB  -CN              Exercise 8    Exercise Name 8 Nustep L5 UE/LE  -CN      Cueing 8 Verbal;Demo  -CN      Time 8 5 min  -CN              Exercise 9    Exercise Name 9 open book stretch  -CN      Cueing 9 Verbal;Demo  -CN      Sets 9 1  -CN      Reps 9 5  -CN      Time 9 10s  -CN      Additional Comments B  -CN              Exercise 12    Exercise Name 12 SL clamshells  -CN      Additional Comments D/C HEP  -CN              Exercise 13    Exercise Name 13 HL hip add  -CN      Additional Comments D/C HEP  -CN              Exercise 15    Exercise Name 15 Lateral stepping  -CN      Additional Comments D/C HEP  -CN              Exercise 16    Exercise Name 16 monster walk  -CN      Additional Comments D/C HEP  -CN            User Key  (r) = Recorded By, (t) = Taken By, (c) = Cosigned By    Initials Name Provider Type    Ludy Nichols, PT Physical Therapist                          Manual Rx (last 36 hours)     Manual Treatments     Row Name 10/03/22 0900             Total Minutes    83831 - PT Manual Therapy Minutes 10  -CN              Manual Rx 1    Manual Rx 1 Location Cerv. In HL  -CN      Manual Rx 1 Type stm UT, levator, infraspinatus; suboccipital release, shoulder ROM into flex with cues for scap squeeze  -CN            User Key  (r) = Recorded By, (t) = Taken By, (c) = Cosigned By    Initials Name Provider Type    Ludy Nichols, PT Physical Therapist                 PT OP Goals     Row Name 10/03/22 1000          PT Short Term Goals    STG Date to Achieve 10/07/22  -CN     STG 1 pt. to be I with initial HEP to facilitate self management of their condition  -CN     STG 1 Progress Met  -CN     STG 2 pt. to be educated in/verbalize understanding of the importance of posture/ergonomics in association with their condition to facilitate self management of their condition  -CN     STG 2 Progress Met  -CN     STG 3 pt to report sleeping through the night without interruptions secondary to pain to facilitate optimal restorative sleep  -CN     STG 3 Progress Ongoing;Partially Met  -CN     STG 3 Progress Comments Pt continues to report waking at times due to shoulder pain with turning.  -CN            Long Term Goals    LTG Date to Achieve 12/09/22  -CN     LTG 1 pt. to be I with advanced HEP to facilitate self management of their condition  -CN     LTG 1 Progress Ongoing  -CN     LTG 2 pt. to report an Oswestry </= 20% to demonstrate decreased level of perceived disability  -CN     LTG 2 Progress Ongoing  -CN     LTG 3 pt. to ascend/descends stairs with reciprocal pattern (</= 1 rails) to facilitate ease/safety of household/community mobility  -CN     LTG 3 Progress Ongoing  -CN     LTG 4 pt to demonstrate R hip abd MMT >/=4+/5 to facilitate ease with performing fitness/leisure activities  -CN     LTG 4 Progress Ongoing  -CN     LTG 5 pt to report inititing regular fitness  activities (pilaties, yoga, etc) to facilitate optimal health  -CN     LTG 5 Progress Ongoing  -CN           User Key  (r) = Recorded By, (t) = Taken By, (c) = Cosigned By    Initials Name Provider Type    Ludy Nichols, PT Physical Therapist                Therapy Education  Given: HEP, Symptoms/condition management  Program: Reinforced, Progressed, New  How Provided: Verbal, Demonstration  Provided to: Patient  Level of Understanding: Teach back education performed              Time Calculation:   Start Time: 0915  Stop Time: 0957  Time Calculation (min): 42 min  Timed Charges  81278 - PT Therapeutic Exercise Minutes: 30  77023 - PT Manual Therapy Minutes: 10  Total Minutes  Timed Charges Total Minutes: 40   Total Minutes: 40  Therapy Charges for Today     Code Description Service Date Service Provider Modifiers Qty    39754068855  PT THER PROC EA 15 MIN 10/3/2022 Ludy Cope, PT GP 2    [id number]  PT MANUAL THERAPY EA 15 MIN 10/3/2022 Ludy Cope, PT GP 1                    Ludy Cope PT  10/3/2022

## 2022-10-06 ENCOUNTER — HOSPITAL ENCOUNTER (OUTPATIENT)
Dept: PHYSICAL THERAPY | Facility: HOSPITAL | Age: 72
Setting detail: THERAPIES SERIES
Discharge: HOME | End: 2022-10-06

## 2022-10-06 DIAGNOSIS — M54.50 CHRONIC BILATERAL LOW BACK PAIN WITHOUT SCIATICA: Primary | ICD-10-CM

## 2022-10-06 DIAGNOSIS — M25.551 RIGHT HIP PAIN: ICD-10-CM

## 2022-10-06 DIAGNOSIS — Z74.09 IMPAIRED MOBILITY: ICD-10-CM

## 2022-10-06 DIAGNOSIS — G89.29 CHRONIC BILATERAL LOW BACK PAIN WITHOUT SCIATICA: Primary | ICD-10-CM

## 2022-10-06 DIAGNOSIS — M54.50 LOW BACK PAIN, UNSPECIFIED BACK PAIN LATERALITY, UNSPECIFIED CHRONICITY, UNSPECIFIED WHETHER SCIATICA PRESENT: ICD-10-CM

## 2022-10-06 DIAGNOSIS — M54.2 CERVICALGIA: ICD-10-CM

## 2022-10-06 PROCEDURE — 97140 MANUAL THERAPY 1/> REGIONS: CPT | Performed by: PHYSICAL THERAPIST

## 2022-10-06 PROCEDURE — 97530 THERAPEUTIC ACTIVITIES: CPT | Performed by: PHYSICAL THERAPIST

## 2022-10-06 NOTE — THERAPY PROGRESS REPORT/RE-CERT
Outpatient Physical Therapy Ortho Progress Note  [facility]     Patient Name: Suly Ware  : 1950  MRN: [id number]  Today's Date: 10/6/2022      Visit Date: 10/06/2022    Visit Dx:    ICD-10-CM ICD-9-CM   1. Chronic bilateral low back pain without sciatica  M54.50 724.2    G89.29 338.29   2. Right hip pain  M25.551 719.45   3. Low back pain, unspecified back pain laterality, unspecified chronicity, unspecified whether sciatica present  M54.50 724.2   4. Cervicalgia  M54.2 723.1   5. Impaired mobility  Z74.09 799.89       Patient Active Problem List   Diagnosis   • Headache, migraine   • DVT (deep venous thrombosis) ([facility])   • Chronic right shoulder pain   • Influenza   • Chronic fatigue   • Family history of breast cancer- paternal aunt age 39, pt is BRCA neg.   • Rosacea   • Osteopenia   • Depression   • Screen for colon cancer   • LGSIL on Pap smear of cervix   • Recurrent vaginitis   • Benign essential microscopic hematuria        Past Medical History:   Diagnosis Date   • Acute serous otitis media    • Acute sinusitis    • Allergic     Pollen, sulfites   • Anxiety uray    • Arthralgia    • Colon polyp    • Depression    • DVT of lower extremity (deep venous thrombosis) (HCC)    • LGSIL on Pap smear of cervix 2019   • Migraine headache    • Osteopenia    • PFO (patent foramen ovale)    • Profound fatigue     when walking upstairs   • Pulmonary embolus (HCC)     from OCPs, thrombophilia w/u neg   • Pulmonary embolus (HCC) 2000    from OCPs, thrombophilia w/u neg   • Shoulder pain         Past Surgical History:   Procedure Laterality Date   • APPENDECTOMY     •  SECTION      x 2   • COLONOSCOPY     • COLONOSCOPY N/A 2019    Procedure: COLONOSCOPY INTO CECUM & TERMINAL ILEUM  WITH COLD SNARE POLYPECTOMY;  Surgeon: Brandon Nielsen MD;  Location: [facility] ENDOSCOPY;  Service: Gastroenterology   • DILATATION AND CURETTAGE     • EXPLORATORY LAPAROTOMY       dermoid removed   • HX OVARIAN CYSTECTOMY     • TUBAL ABDOMINAL LIGATION          PT Ortho     Row Name 10/06/22 0900       Cervical/Shoulder ROM Screen    Cervical rotation Normal  R 60, L 65  -GJ       Special Tests/Palpation    Special Tests/Palpation Shoulder  -GJ       Cervical Palpation    Levator Scapula Right:;Tender;Guarded/taut;Trigger point  -GJ    Upper Traps Right:;Tender;Trigger point  -GJ       Shoulder Impingement/Rotator Cuff Special Tests    Albert-Mihir Test (RC Lesion vs. Bursitis) Right:;Positive  -GJ    Neer Impingement Test (RC Lesion vs. Bursitis) Right:;Positive  -GJ    Full Can Test (RC Lesion) Left:;Negative  -GJ    Empty Can Test (RC Lesion) Right:;Positive  -GJ       General ROM    RT Upper Ext Rt Shoulder ABduction;Rt Shoulder Flexion;Rt Shoulder External Rotation;Rt Shoulder Internal Rotation  -GJ    LT Upper Ext Lt Shoulder ABduction;Lt Shoulder Flexion;Lt Shoulder External Rotation;Lt Shoulder Internal Rotation  -GJ       Right Upper Ext    Rt Shoulder Abduction AROM 130 seated  -GJ    Rt Shoulder Abduction PROM 170 supine  -GJ    Rt Shoulder Flexion AROM 140 seated  -GJ    Rt Shoulder Flexion PROM 160 supine  -GJ    Rt Shoulder External Rotation PROM 80 suine  -GJ    Rt Shoulder Internal Rotation AROM midline T12  -GJ       Left Upper Ext    Lt Shoulder Abduction AROM 160 seated  -GJ    Lt Shoulder Flexion AROM 160 seated  -GJ    Lt Shoulder Internal Rotation AROM midline T5  -GJ          User Key  (r) = Recorded By, (t) = Taken By, (c) = Cosigned By    Initials Name Provider Type    GJ Gianni Carrasco, PT Physical Therapist                             PT Assessment/Plan     Row Name 10/06/22 0957          PT Assessment    Functional Limitations Limitation in home management;Limitations in community activities;Performance in leisure activities;Performance in sport activities  -     Impairments Impaired flexibility;Impaired muscle length;Impaired muscle power;Impaired  postural alignment;Muscle strength;Pain;Poor body mechanics;Posture;Range of motion  -     Assessment Comments Ms. Ware is a 73 y/o R handed female. She has attended 9 sessions of physical therapy for her LBP/ C spine / R shoulder pain. PRogress towards goals is good having met 2 of 3 STG's and 3 of 6 LTG's (one goal is new).  Her LBP has improved and is stable so we will hold on treating LBP at this time and change focus to R shoulder/C spine. See ortho section for R shoulder AROM. She demosntrate decreased R shouldder flexion/abd vs. L with (+) R Hawkin's Mihir, (+) Neer's and, empty can on R.  SHe does demonstrate taut band in her R levator scap tissue, which we may address with DDN next session. Ms. Ware, over all is progressing toward all remaining functional goals and remains a good candidate for skilled physical therapy.  -GJ     Please refer to paper survey for additional self-reported information Yes  -GJ     Rehab Potential Good  -     Patient/caregiver participated in establishment of treatment plan and goals Yes  -GJ            PT Plan    PT Frequency 1x/week;2x/week  -GJ     Predicted Duration of Therapy Intervention (PT) 10 visits  -GJ     Planned CPT's? PT RE-EVAL: 19323;PT THER PROC EA 15 MIN: 61453;PT THER ACT EA 15 MIN: 44184;PT MANUAL THERAPY EA 15 MIN: 60908;PT NEUROMUSC RE-EDUCATION EA 15 MIN: 48079;PT AQUATIC THERAPY EA 15 MIN: 03734;PT HOT OR COLD PACK TREAT MCARE;PT ELECTRICAL STIM UNATTEND: ;PT TRACTION CERVICAL: 89159  -     PT Plan Comments assess repsone to manual, repeat if beneficial, may consider c spine mechanical distraction, may consider DDN R levator/UT, hold on L spine treatments, work on R shoulder abd/flexion ROM/strengthening  -           User Key  (r) = Recorded By, (t) = Taken By, (c) = Cosigned By    Initials Name Provider Type    Gianni Graves, PT Physical Therapist                   OP Exercises     Row Name 10/06/22 0956 10/06/22 0900           Subjective Comments    Subjective Comments -- i'm feeling better and stronger overall, my neck bothered me last night, so I went back to using the towel roll  -GJ            Total Minutes    15494 - PT Therapeutic Activity Minutes 12  assessment/education  -GJ --     96991 - PT Manual Therapy Minutes 30  -GJ --            Exercise 3    Exercise Name 3 -- --  -GJ            Exercise 4    Exercise Name 4 -- --  -GJ     Cueing 4 -- --  -GJ     Sets 4 -- --  -GJ     Reps 4 -- --  -GJ     Additional Comments -- --  -GJ            Exercise 7    Exercise Name 7 -- --  -GJ     Cueing 7 -- --  -GJ     Sets 7 -- --  -GJ     Reps 7 -- --  -GJ     Time 7 -- --  -GJ            Exercise 8    Exercise Name 8 -- Nustep L5 UE/LE  -GJ     Time 8 -- 5 min  -GJ           User Key  (r) = Recorded By, (t) = Taken By, (c) = Cosigned By    Initials Name Provider Type    Gianni Graves, PT Physical Therapist                         Manual Rx (last 36 hours)     Manual Treatments     Row Name 10/06/22 0956 10/06/22 0900          Total Minutes    25508 - PT Manual Therapy Minutes 30  -GJ --            Manual Rx 1    Manual Rx 1 Location -- Cerv. In HL  -GJ     Manual Rx 1 Type -- PA mobs of C spine, pt supine, grade II/III  -GJ     Manual Rx 1 Grade -- manual stretch of R UT/levator, pec major tissues  -GJ     Manual Rx 1 Duration -- manual c spine distraction  -GJ           User Key  (r) = Recorded By, (t) = Taken By, (c) = Cosigned By    Initials Name Provider Type    Gianni Graves, PT Physical Therapist                 PT OP Goals     Row Name 10/06/22 0900          PT Short Term Goals    STG Date to Achieve 10/07/22  -GJ     STG 1 pt. to be I with initial HEP to facilitate self management of their condition  -GJ     STG 1 Progress Met  -GJ     STG 2 pt. to be educated in/verbalize understanding of the importance of posture/ergonomics in association with their condition to facilitate self management of their condition  -GJ     STG  2 Progress Met  -GJ     STG 3 pt to report sleeping through the night without interruptions secondary to pain to facilitate optimal restorative sleep  -GJ     STG 3 Progress Ongoing;Partially Met  -GJ     STG 3 Progress Comments still waking at night secondary to c spine pain  -            Long Term Goals    LTG Date to Achieve 12/09/22  -GJ     LTG 1 pt. to be I with advanced HEP to facilitate self management of their condition  -GJ     LTG 1 Progress Ongoing  -GJ     LTG 2 pt. to report an Oswestry </= 20% to demonstrate decreased level of perceived disability  -GJ     LTG 2 Progress Met  -GJ     LTG 2 Progress Comments 6%  -GJ     LTG 3 pt. to ascend/descends stairs with reciprocal pattern (</= 1 rails) to facilitate ease/safety of household/community mobility  -GJ     LTG 3 Progress Met  -GJ     LTG 4 pt to demonstrate R hip abd MMT >/=4+/5 to facilitate ease with performing fitness/leisure activities  -GJ     LTG 4 Progress Met  -GJ     LTG 5 pt to report inititing regular fitness activities (pilaties, yoga, etc) to facilitate optimal health  -GJ     LTG 5 Progress Ongoing  -GJ     LTG 6 pt to demonstrate R shoulder AROM flexion/abduction >/= 160 to facilitate ease with household/dressing activities  -GJ     LTG 6 Progress New  -           User Key  (r) = Recorded By, (t) = Taken By, (c) = Cosigned By    Initials Name Provider Type    Gianni Graves, PT Physical Therapist                Therapy Education  Education Details: discussed progress to date, shoulder c/ spine inter dependence, discussed rissks/benefits of DDN  Given: HEP, Symptoms/condition management, Pain management, Posture/body mechanics, Mobility training  Program: Reinforced  How Provided: Verbal  Provided to: Patient  Level of Understanding: Verbalized    Outcome Measure Options: Modified Oswestry  Modified Oswestry  Modified Oswestry Score/Comments: 6%      Time Calculation:   Start Time: 1000  Stop Time: 1045  Time Calculation (min):  45 min  Timed Charges  23640 - PT Manual Therapy Minutes: 30  20640 - PT Therapeutic Activity Minutes: 12 (assessment/education)  Total Minutes  Timed Charges Total Minutes: 42   Total Minutes: 42  Therapy Charges for Today     Code Description Service Date Service Provider Modifiers Qty    73726329039 HC PT THERAPEUTIC ACT EA 15 MIN 10/6/2022 Gianni Carrasco, PT GP 1    50150911419 HC PT MANUAL THERAPY EA 15 MIN 10/6/2022 Gianni Carrasco, PT GP 2          PT G-Codes  Outcome Measure Options: Modified Oswestry  Modified Oswestry Score/Comments: 6%         Gianni Carrasco, PT  10/6/2022

## 2022-10-10 ENCOUNTER — HOSPITAL ENCOUNTER (OUTPATIENT)
Dept: PHYSICAL THERAPY | Facility: HOSPITAL | Age: 72
Setting detail: THERAPIES SERIES
Discharge: HOME | End: 2022-10-10

## 2022-10-10 DIAGNOSIS — M54.50 CHRONIC BILATERAL LOW BACK PAIN WITHOUT SCIATICA: Primary | ICD-10-CM

## 2022-10-10 DIAGNOSIS — G89.29 CHRONIC BILATERAL LOW BACK PAIN WITHOUT SCIATICA: Primary | ICD-10-CM

## 2022-10-10 DIAGNOSIS — M54.2 CERVICALGIA: ICD-10-CM

## 2022-10-10 DIAGNOSIS — M54.50 LOW BACK PAIN, UNSPECIFIED BACK PAIN LATERALITY, UNSPECIFIED CHRONICITY, UNSPECIFIED WHETHER SCIATICA PRESENT: ICD-10-CM

## 2022-10-10 DIAGNOSIS — M25.551 RIGHT HIP PAIN: ICD-10-CM

## 2022-10-10 DIAGNOSIS — Z74.09 IMPAIRED MOBILITY: ICD-10-CM

## 2022-10-10 PROCEDURE — 97110 THERAPEUTIC EXERCISES: CPT

## 2022-10-10 PROCEDURE — 97140 MANUAL THERAPY 1/> REGIONS: CPT

## 2022-10-10 NOTE — THERAPY TREATMENT NOTE
Outpatient Physical Therapy Ortho Treatment Note  [facility]     Patient Name: Suly Ware  : 1950  MRN: [id number]  Today's Date: 10/10/2022      Visit Date: 10/10/2022    Visit Dx:    ICD-10-CM ICD-9-CM   1. Chronic bilateral low back pain without sciatica  M54.50 724.2    G89.29 338.29   2. Right hip pain  M25.551 719.45   3. Low back pain, unspecified back pain laterality, unspecified chronicity, unspecified whether sciatica present  M54.50 724.2   4. Cervicalgia  M54.2 723.1   5. Impaired mobility  Z74.09 799.89       Patient Active Problem List   Diagnosis   • Headache, migraine   • DVT (deep venous thrombosis) ([facility])   • Chronic right shoulder pain   • Influenza   • Chronic fatigue   • Family history of breast cancer- paternal aunt age 39, pt is BRCA neg.   • Rosacea   • Osteopenia   • Depression   • Screen for colon cancer   • LGSIL on Pap smear of cervix   • Recurrent vaginitis   • Benign essential microscopic hematuria        Past Medical History:   Diagnosis Date   • Acute serous otitis media    • Acute sinusitis    • Allergic     Pollen, sulfites   • Anxiety uray    • Arthralgia    • Colon polyp    • Depression    • DVT of lower extremity (deep venous thrombosis) (HCC)    • LGSIL on Pap smear of cervix 2019   • Migraine headache    • Osteopenia    • PFO (patent foramen ovale)    • Profound fatigue     when walking upstairs   • Pulmonary embolus (HCC)     from OCPs, thrombophilia w/u neg   • Pulmonary embolus (HCC) 2000    from OCPs, thrombophilia w/u neg   • Shoulder pain         Past Surgical History:   Procedure Laterality Date   • APPENDECTOMY     •  SECTION      x 2   • COLONOSCOPY     • COLONOSCOPY N/A 2019    Procedure: COLONOSCOPY INTO CECUM & TERMINAL ILEUM  WITH COLD SNARE POLYPECTOMY;  Surgeon: Brandon Nielsen MD;  Location: [facility] ENDOSCOPY;  Service: Gastroenterology   • DILATATION AND CURETTAGE     • EXPLORATORY LAPAROTOMY       dermoid removed   • HX OVARIAN CYSTECTOMY     • TUBAL ABDOMINAL LIGATION                          PT Assessment/Plan     Row Name 10/10/22 1345          PT Assessment    Assessment Comments Pt reports soreness following last visit for 2 days, however minimal pain since that time. Pt has been using cervical pillow and has noticed improved sleep as well as decreased R shoulder pain. Continued with RTC and scapular strengthening with good tolerance and will likely update HEP next visit.  -CN        PT Plan    PT Plan Comments Assess response to current program and likely update HEP next visit. Consider prone Is and Ts.  -CN           User Key  (r) = Recorded By, (t) = Taken By, (c) = Cosigned By    Initials Name Provider Type    Ludy Nichols, PT Physical Therapist                   OP Exercises     Row Name 10/10/22 0900             Subjective Comments    Subjective Comments I feel really good, my  found a cervical pillow at home which has been helping. I dont' have any pain, just stiffness with turning. The pillow has also helped my shoulder I think.  -CN         Subjective Pain    Able to rate subjective pain? yes  -CN      Pre-Treatment Pain Level 0  -CN         Total Minutes    05161 - PT Therapeutic Exercise Minutes 40  -CN      36090 - PT Manual Therapy Minutes --  -CN         Exercise 1    Exercise Name 1 Seated SNAG  -CN      Cueing 1 Verbal;Demo  -CN      Reps 1 10 B  -CN      Time 1 5 sec  -CN         Exercise 2    Exercise Name 2 Wall wash  -CN      Cueing 2 Verbal;Demo  -CN      Reps 2 10  -CN      Time 2 5 sec  -CN         Exercise 3    Exercise Name 3 Quadruped chin tuck  -CN      Cueing 3 Verbal;Demo  -CN      Reps 3 10  -CN      Time 3 5 sec  -CN      Additional Comments cues to push through hands for scap/SA engagement  -CN         Exercise 4    Exercise Name 4 HA/ER with noodle on wall  -CN      Cueing 4 Verbal;Demo  -CN      Sets 4 2  -CN      Reps 4 10 ea  -CN      Additional  Comments RTB with chin tuck  -CN         Exercise 5    Exercise Name 5 seated UT/levator scap stretch  -CN      Cueing 5 Verbal;Demo  -CN      Reps 5 3  -CN      Time 5 20 s  -CN         Exercise 6    Exercise Name 6 Wall angels with noodle  -CN      Cueing 6 Verbal;Demo  -CN      Reps 6 10  -CN         Exercise 7    Exercise Name 7 B UE flex with noodle on wall  -CN      Cueing 7 Verbal;Demo  -CN      Sets 7 2  -CN      Reps 7 10  -CN      Time 7 RTB  -CN         Exercise 8    Exercise Name 8 Nustep L5 UE/LE  -CN      Time 8 5 min  -CN         Exercise 9    Exercise Name 9 open book stretch  -CN      Cueing 9 Verbal;Demo  -CN      Sets 9 1  -CN      Reps 9 5  -CN      Time 9 10s  -CN      Additional Comments B  -CN         Exercise 10    Exercise Name 10 Wall wash into scap  -CN      Cueing 10 Verbal;Demo  -CN      Reps 10 10  -CN      Time 10 5 sec  -CN         Exercise 11    Exercise Name 11 Doorway pec stretch, 90/90 UE position  -CN      Cueing 11 Verbal;Demo  -CN      Reps 11 3  -CN      Time 11 20 sec  -CN         Exercise 12    Exercise Name 12 Lat pull  -CN      Cueing 12 Verbal;Demo  -CN      Reps 12 10  -CN      Additional Comments 3 bars at TS, cues for dec UT activation  -CN         Exercise 13    Exercise Name 13 Rows in tandem stance  -CN      Cueing 13 Verbal;Demo  -CN      Reps 13 10 B  -CN      Additional Comments GTB  -CN         Exercise 14    Exercise Name 14 Shd ext  -CN      Cueing 14 Verbal;Demo  -CN      Sets 14 2  -CN      Reps 14 10  -CN      Additional Comments GTB  -CN            User Key  (r) = Recorded By, (t) = Taken By, (c) = Cosigned By    Initials Name Provider Type    Ludy Nichols, PT Physical Therapist                         Manual Rx (last 36 hours)     Manual Treatments     Row Name 10/10/22 0900             Total Minutes    32222 - PT Manual Therapy Minutes --  -CN         Manual Rx 1    Manual Rx 1 Location --  -CN      Manual Rx 1 Type --  -CN      Manual Rx 1  Grade --  -CN            User Key  (r) = Recorded By, (t) = Taken By, (c) = Cosigned By    Initials Name Provider Type    Ludy Nichols, PT Physical Therapist                 PT OP Goals     Row Name 10/10/22 1300          PT Short Term Goals    STG Date to Achieve 10/07/22  -CN     STG 1 pt. to be I with initial HEP to facilitate self management of their condition  -CN     STG 1 Progress Met  -CN     STG 2 pt. to be educated in/verbalize understanding of the importance of posture/ergonomics in association with their condition to facilitate self management of their condition  -CN     STG 2 Progress Met  -CN     STG 3 pt to report sleeping through the night without interruptions secondary to pain to facilitate optimal restorative sleep  -CN     STG 3 Progress Partially Met  -CN     STG 3 Progress Comments Pt with improved pain at night with cervical pillow.  -CN        Long Term Goals    LTG Date to Achieve 12/09/22  -CN     LTG 1 pt. to be I with advanced HEP to facilitate self management of their condition  -CN     LTG 1 Progress Ongoing  -CN     LTG 2 pt. to report an Oswestry </= 20% to demonstrate decreased level of perceived disability  -CN     LTG 2 Progress Met  -CN     LTG 3 pt. to ascend/descends stairs with reciprocal pattern (</= 1 rails) to facilitate ease/safety of household/community mobility  -CN     LTG 3 Progress Met  -CN     LTG 4 pt to demonstrate R hip abd MMT >/=4+/5 to facilitate ease with performing fitness/leisure activities  -CN     LTG 4 Progress Met  -CN     LTG 5 pt to report inititing regular fitness activities (pilaties, yoga, etc) to facilitate optimal health  -CN     LTG 5 Progress Ongoing  -CN     LTG 6 pt to demonstrate R shoulder AROM flexion/abduction >/= 160 to facilitate ease with household/dressing activities  -CN     LTG 6 Progress Ongoing  -CN           User Key  (r) = Recorded By, (t) = Taken By, (c) = Cosigned By    Initials Name Provider Type    ALEC Cope  Ludy Julian, PT Physical Therapist                Therapy Education  Given: HEP, Symptoms/condition management, Pain management, Posture/body mechanics, Mobility training  Program: Reinforced  How Provided: Verbal  Provided to: Patient  Level of Understanding: Verbalized              Time Calculation:   Start Time: 0919  Stop Time: 1000  Time Calculation (min): 41 min  Timed Charges  95660 - PT Therapeutic Exercise Minutes: 40  Total Minutes  Timed Charges Total Minutes: 40   Total Minutes: 40  Therapy Charges for Today     Code Description Service Date Service Provider Modifiers Qty    00518660245  PT THER PROC EA 15 MIN 10/10/2022 Ludy Cope, PT GP 3                    Ludy Cope, PT  10/10/2022

## 2022-10-12 ENCOUNTER — HOSPITAL ENCOUNTER (OUTPATIENT)
Dept: PHYSICAL THERAPY | Facility: HOSPITAL | Age: 72
Setting detail: THERAPIES SERIES
Discharge: HOME | End: 2022-10-12

## 2022-10-12 DIAGNOSIS — M25.551 RIGHT HIP PAIN: ICD-10-CM

## 2022-10-12 DIAGNOSIS — M54.50 LOW BACK PAIN, UNSPECIFIED BACK PAIN LATERALITY, UNSPECIFIED CHRONICITY, UNSPECIFIED WHETHER SCIATICA PRESENT: ICD-10-CM

## 2022-10-12 DIAGNOSIS — Z74.09 IMPAIRED MOBILITY: ICD-10-CM

## 2022-10-12 DIAGNOSIS — M54.50 CHRONIC BILATERAL LOW BACK PAIN WITHOUT SCIATICA: Primary | ICD-10-CM

## 2022-10-12 DIAGNOSIS — M54.2 CERVICALGIA: ICD-10-CM

## 2022-10-12 DIAGNOSIS — G89.29 CHRONIC BILATERAL LOW BACK PAIN WITHOUT SCIATICA: Primary | ICD-10-CM

## 2022-10-12 NOTE — THERAPY TREATMENT NOTE
Outpatient Physical Therapy Ortho Treatment Note  [facility]     Patient Name: Suly Ware  : 1950  MRN: [id number]  Today's Date: 10/12/2022      Visit Date: 10/12/2022    Visit Dx:    ICD-10-CM ICD-9-CM   1. Chronic bilateral low back pain without sciatica  M54.50 724.2    G89.29 338.29   2. Right hip pain  M25.551 719.45   3. Low back pain, unspecified back pain laterality, unspecified chronicity, unspecified whether sciatica present  M54.50 724.2   4. Cervicalgia  M54.2 723.1   5. Impaired mobility  Z74.09 799.89       Patient Active Problem List   Diagnosis   • Headache, migraine   • DVT (deep venous thrombosis) ([facility])   • Chronic right shoulder pain   • Influenza   • Chronic fatigue   • Family history of breast cancer- paternal aunt age 39, pt is BRCA neg.   • Rosacea   • Osteopenia   • Depression   • Screen for colon cancer   • LGSIL on Pap smear of cervix   • Recurrent vaginitis   • Benign essential microscopic hematuria        Past Medical History:   Diagnosis Date   • Acute serous otitis media    • Acute sinusitis    • Allergic     Pollen, sulfites   • Anxiety uray    • Arthralgia    • Colon polyp    • Depression    • DVT of lower extremity (deep venous thrombosis) (HCC)    • LGSIL on Pap smear of cervix 2019   • Migraine headache    • Osteopenia    • PFO (patent foramen ovale)    • Profound fatigue     when walking upstairs   • Pulmonary embolus (HCC)     from OCPs, thrombophilia w/u neg   • Pulmonary embolus (HCC) 2000    from OCPs, thrombophilia w/u neg   • Shoulder pain         Past Surgical History:   Procedure Laterality Date   • APPENDECTOMY     •  SECTION      x 2   • COLONOSCOPY     • COLONOSCOPY N/A 2019    Procedure: COLONOSCOPY INTO CECUM & TERMINAL ILEUM  WITH COLD SNARE POLYPECTOMY;  Surgeon: Brandon Nielsen MD;  Location: [facility] ENDOSCOPY;  Service: Gastroenterology   • DILATATION AND CURETTAGE     • EXPLORATORY LAPAROTOMY       dermoid removed   • HX OVARIAN CYSTECTOMY     • TUBAL ABDOMINAL LIGATION                          PT Assessment/Plan     Row Name 10/12/22 1148          PT Assessment    Assessment Comments Ms. Ware returns to the clinic today, reporting oveall improvement in he c spine pain.  She reports appropriate 1 day soreness following previous session with progression of strengthening. She wishes to proceed with DDN of her R UT/levator scapular tissues. Following verbal/written consent, we proceeded.  She demonstrated multiple moderate to large LTR's.  She tolerated needling without  immediate adverse response. Ms. Ware continues to be a candidate for skilled physical therapy  -        PT Plan    PT Plan Comments assess responet to DDN of R UT/levatoar scap tissues, likely resume upper quarter/RC/postural strengthening exercises  -           User Key  (r) = Recorded By, (t) = Taken By, (c) = Cosigned By    Initials Name Provider Type    Gianni Graves, PT Physical Therapist                   OP Exercises     Row Name 10/12/22 1100             Subjective Comments    Subjective Comments I'm feeling better after the work on my neck. I was sore for a day after last session with the new strengthening exercises. I want to try dry needling today  -            User Key  (r) = Recorded By, (t) = Taken By, (c) = Cosigned By    Initials Name Provider Type    Gianni Graves, PT Physical Therapist                         Manual Rx (last 36 hours)     Manual Treatments     Row Name 10/12/22 1100             Manual Rx 3    Manual Rx 3 Location intramuscular manual therapy  - Assessed pt. for Dry Needling and intramuscular manual therapy. Discussed risks/benefits of Dry Needling with pt, including but not limited to muscle soreness, bruising, vasovagal response, pneumothorax, nerve injury. Patient signed written consent to proceed with treatment.    Patient position during treatment: prone, nose in nose hole  Muscles  treated: R UT/R levator scapular tissues  Response to treatment: multiple moderate to large LTR's. No immediate adverse response. SHe did report referral pattern to R mastoid/cervical region.    palpation used before, during, and after to facilitate assessment Clean needle technique observed at all times, precautions for lung fields, neurovascular structures observed.    DDN performed by Gianni Carrasco II, PT, DPT     Manual Rx 3 Type R UT  -GJ            User Key  (r) = Recorded By, (t) = Taken By, (c) = Cosigned By    Initials Name Provider Type    Gianni Graves, PT Physical Therapist                    Therapy Education  Education Details: discussed risks/benefits of DDN and it's role in therapy as an adjunct therapy and not a stand alone treatment  Given: HEP, Symptoms/condition management, Pain management, Posture/body mechanics, Mobility training  How Provided: Verbal  Provided to: Patient  Level of Understanding: Verbalized              Time Calculation:   Start Time: 0915  Stop Time: 0942  Time Calculation (min): 27 min  Therapy Charges for Today     Code Description Service Date Service Provider Modifiers Qty    08854025435  PT SELF PAY DRY NEEDLING SESSION 10/12/2022 Gianni Carrasco, PT  1                    Gianni Carrasco PT  10/12/2022

## 2022-10-17 ENCOUNTER — HOSPITAL ENCOUNTER (OUTPATIENT)
Dept: PHYSICAL THERAPY | Facility: HOSPITAL | Age: 72
Setting detail: THERAPIES SERIES
Discharge: HOME | End: 2022-10-17

## 2022-10-17 DIAGNOSIS — M54.2 CERVICALGIA: ICD-10-CM

## 2022-10-17 DIAGNOSIS — G89.29 CHRONIC BILATERAL LOW BACK PAIN WITHOUT SCIATICA: Primary | ICD-10-CM

## 2022-10-17 DIAGNOSIS — M25.551 RIGHT HIP PAIN: ICD-10-CM

## 2022-10-17 DIAGNOSIS — M54.50 LOW BACK PAIN, UNSPECIFIED BACK PAIN LATERALITY, UNSPECIFIED CHRONICITY, UNSPECIFIED WHETHER SCIATICA PRESENT: ICD-10-CM

## 2022-10-17 DIAGNOSIS — Z74.09 IMPAIRED MOBILITY: ICD-10-CM

## 2022-10-17 DIAGNOSIS — M54.50 CHRONIC BILATERAL LOW BACK PAIN WITHOUT SCIATICA: Primary | ICD-10-CM

## 2022-10-17 PROCEDURE — 97140 MANUAL THERAPY 1/> REGIONS: CPT

## 2022-10-17 PROCEDURE — 97110 THERAPEUTIC EXERCISES: CPT

## 2022-10-17 NOTE — THERAPY TREATMENT NOTE
Outpatient Physical Therapy Ortho Treatment Note  [facility]     Patient Name: Suly Ware  : 1950  MRN: [id number]  Today's Date: 10/17/2022      Visit Date: 10/17/2022    Visit Dx:    ICD-10-CM ICD-9-CM   1. Chronic bilateral low back pain without sciatica  M54.50 724.2    G89.29 338.29   2. Cervicalgia  M54.2 723.1   3. Impaired mobility  Z74.09 799.89   4. Right hip pain  M25.551 719.45   5. Low back pain, unspecified back pain laterality, unspecified chronicity, unspecified whether sciatica present  M54.50 724.2       Patient Active Problem List   Diagnosis   • Headache, migraine   • DVT (deep venous thrombosis) ([facility])   • Chronic right shoulder pain   • Influenza   • Chronic fatigue   • Family history of breast cancer- paternal aunt age 39, pt is BRCA neg.   • Rosacea   • Osteopenia   • Depression   • Screen for colon cancer   • LGSIL on Pap smear of cervix   • Recurrent vaginitis   • Benign essential microscopic hematuria        Past Medical History:   Diagnosis Date   • Acute serous otitis media    • Acute sinusitis    • Allergic     Pollen, sulfites   • Anxiety uray    • Arthralgia    • Colon polyp    • Depression    • DVT of lower extremity (deep venous thrombosis) (HCC)    • LGSIL on Pap smear of cervix 2019   • Migraine headache    • Osteopenia    • PFO (patent foramen ovale)    • Profound fatigue     when walking upstairs   • Pulmonary embolus (HCC)     from OCPs, thrombophilia w/u neg   • Pulmonary embolus (HCC) 2000    from OCPs, thrombophilia w/u neg   • Shoulder pain         Past Surgical History:   Procedure Laterality Date   • APPENDECTOMY     •  SECTION      x 2   • COLONOSCOPY     • COLONOSCOPY N/A 2019    Procedure: COLONOSCOPY INTO CECUM & TERMINAL ILEUM  WITH COLD SNARE POLYPECTOMY;  Surgeon: Brandon Nielsen MD;  Location: [facility] ENDOSCOPY;  Service: Gastroenterology   • DILATATION AND CURETTAGE     • EXPLORATORY LAPAROTOMY       dermoid removed   • HX OVARIAN CYSTECTOMY     • TUBAL ABDOMINAL LIGATION                          PT Assessment/Plan     Row Name 10/17/22 1536          PT Assessment    Assessment Comments Ms. Ware reports moderates soreness following DDN last session, lasting until Sat. but then her shoulder felt much more relaxed.  Primary complaint today is some tenderness higher up neck, and still stiff when she tries to put car in reverse.  Pt responded well with manual therapy today, with improved Cerv. Rotation-L afterwards.  Continues to progress with strengthening of scapula along with postural habits.  -LP     Please refer to paper survey for additional self-reported information Yes  -LP     Rehab Potential Good  -LP     Patient/caregiver participated in establishment of treatment plan and goals Yes  -LP     Patient would benefit from skilled therapy intervention Yes  -LP        PT Plan    PT Frequency 2x/week  -LP     PT Plan Comments Continue with manual therapy as needed, progress strengthening. Consider UBE.  -LP           User Key  (r) = Recorded By, (t) = Taken By, (c) = Cosigned By    Initials Name Provider Type    LP Susannah Simmons, PT Physical Therapist                   OP Exercises     Row Name 10/17/22 1500 10/17/22 0900          Subjective Comments    Subjective Comments -- Sore after the DDN to my neck and shoulder, but it does feel a little more relaxed  -LP        Subjective Pain    Able to rate subjective pain? -- yes  -LP     Pre-Treatment Pain Level -- 2  -LP     Subjective Pain Comment -- R shoulder and neck  -LP        Total Minutes    30700 - PT Therapeutic Exercise Minutes -- 32  -LP     12660 - PT Manual Therapy Minutes 10  -LP --        Exercise 1    Exercise Name 1 -- Seated SNAG  -LP     Cueing 1 -- Verbal;Demo  -LP     Reps 1 -- 10B  -LP     Time 1 -- 5-10s  -LP     Additional Comments -- cuing to perform- she had forgotten how to do it  -LP        Exercise 2    Exercise Name 2 -- Wall  wash  -LP     Cueing 2 -- Verbal;Demo  -LP     Sets 2 -- 1  -LP     Reps 2 -- 10  -LP     Time 2 -- 5 sec  -LP        Exercise 3    Exercise Name 3 -- Quadruped chin tuck  -LP     Cueing 3 -- Verbal;Demo  -LP     Sets 3 -- 1  -LP     Reps 3 -- 10  -LP     Time 3 -- 5 sec  -LP     Additional Comments -- good form  -LP        Exercise 4    Exercise Name 4 -- HA/ER with noodle on wall  -LP     Cueing 4 -- Verbal;Demo  -LP     Sets 4 -- 2  -LP     Reps 4 -- 10 ea  -LP     Additional Comments -- RTB, cuing for chin tuck, but corrects easily  -LP        Exercise 5    Exercise Name 5 -- seated cerv SB'g stretch  -LP     Cueing 5 -- Verbal;Demo  -LP     Additional Comments -- pt having pain with levator stetch after several attempts  -LP        Exercise 6    Exercise Name 6 -- Wall angels with noodle  -LP     Cueing 6 -- Verbal;Demo  -LP     Reps 6 -- 10  -LP        Exercise 8    Exercise Name 8 -- Nustep L5 UE/LE  -LP     Cueing 8 -- Verbal;Demo  -LP     Time 8 -- 5 min  -LP     Additional Comments -- Seat @ 6, handles @ 7, L5  -LP        Exercise 9    Exercise Name 9 -- open book stretch  -LP     Cueing 9 -- Verbal;Demo  -LP     Sets 9 -- 1  -LP     Reps 9 -- 5  -LP     Time 9 -- B  -LP        Exercise 10    Exercise Name 10 -- Wall wash into scap  -LP        Exercise 11    Exercise Name 11 -- Doorway pec stretch, 90/90 UE position  -LP     Cueing 11 -- Verbal;Demo  -LP     Sets 11 -- 1  -LP     Reps 11 -- 3  -LP     Time 11 -- 20s  -LP     Additional Comments -- pt able to bring UE's closer to 90 degree angle  -LP        Exercise 12    Exercise Name 12 -- TS Lat pull-seated  -LP     Cueing 12 -- Verbal;Demo  -LP     Reps 12 -- 10  -LP     Additional Comments -- 3plates  -LP        Exercise 15    Exercise Name 15 -- thoracic stretch in doorway  -LP     Cueing 15 -- Verbal;Demo  -LP     Sets 15 -- 1  -LP     Reps 15 -- 3  -LP     Time 15 -- 20s  -LP     Additional Comments -- pt especially likes this one.  -LP            User Key  (r) = Recorded By, (t) = Taken By, (c) = Cosigned By    Initials Name Provider Type    LP Susannah Simmons, PT Physical Therapist                         Manual Rx (last 36 hours)     Manual Treatments     Row Name 10/17/22 1500             Total Minutes    92868 - PT Manual Therapy Minutes 10  -LP         Manual Rx 1    Manual Rx 1 Location Supine Cerv.  -LP      Manual Rx 1 Type sub occipital release/PROM to C-spine/STM to B cerv. paraspinals  -LP      Manual Rx 1 Grade gentle  -LP      Manual Rx 1 Duration 10  -LP            User Key  (r) = Recorded By, (t) = Taken By, (c) = Cosigned By    Initials Name Provider Type    LP Susannah Smimons, PT Physical Therapist                 PT OP Goals     Row Name 10/17/22 1500          PT Short Term Goals    STG Date to Achieve 10/07/22  -LP     STG 1 pt. to be I with initial HEP to facilitate self management of their condition  -LP     STG 1 Progress Met  -LP     STG 2 pt. to be educated in/verbalize understanding of the importance of posture/ergonomics in association with their condition to facilitate self management of their condition  -LP     STG 2 Progress Met  -LP     STG 3 pt to report sleeping through the night without interruptions secondary to pain to facilitate optimal restorative sleep  -LP     STG 3 Progress Partially Met  -LP     STG 3 Progress Comments was doing better but woke some last night and this AM tender high up on my neck  -LP        Long Term Goals    LTG Date to Achieve 12/09/22  -LP     LTG 1 pt. to be I with advanced HEP to facilitate self management of their condition  -LP     LTG 1 Progress Ongoing  -LP     LTG 2 pt. to report an Oswestry </= 20% to demonstrate decreased level of perceived disability  -LP     LTG 2 Progress Met  -LP     LTG 3 pt. to ascend/descends stairs with reciprocal pattern (</= 1 rails) to facilitate ease/safety of household/community mobility  -LP     LTG 3 Progress Met  -LP     LTG 4 pt to demonstrate R hip  abd MMT >/=4+/5 to facilitate ease with performing fitness/leisure activities  -LP     LTG 4 Progress Met  -LP     LTG 5 pt to report inititing regular fitness activities (pilaties, yoga, etc) to facilitate optimal health  -LP     LTG 5 Progress Ongoing  -LP     LTG 6 pt to demonstrate R shoulder AROM flexion/abduction >/= 160 to facilitate ease with household/dressing activities  -LP     LTG 6 Progress Ongoing  -LP           User Key  (r) = Recorded By, (t) = Taken By, (c) = Cosigned By    Initials Name Provider Type    LP Susannah Simmons, PT Physical Therapist                Therapy Education  Given: HEP, Posture/body mechanics, Symptoms/condition management  Program: New, Reinforced  How Provided: Verbal, Demonstration  Provided to: Patient  Level of Understanding: Teach back education performed              Time Calculation:   Start Time: 0915  Stop Time: 1000  Time Calculation (min): 45 min  Timed Charges  50823 - PT Therapeutic Exercise Minutes: 32  63108 - PT Manual Therapy Minutes: 10  Total Minutes  Timed Charges Total Minutes: 10   Total Minutes: 10  Therapy Charges for Today     Code Description Service Date Service Provider Modifiers Qty    25131248952 HC PT MANUAL THERAPY EA 15 MIN 10/17/2022 Susannah Simmons, PT GP 1    24797207804 HC PT THER PROC EA 15 MIN 10/17/2022 Susannah Simmons PT GP 2                    Susannah Simmons PT  10/17/2022

## 2022-10-19 ENCOUNTER — HOSPITAL ENCOUNTER (OUTPATIENT)
Dept: PHYSICAL THERAPY | Facility: HOSPITAL | Age: 72
Setting detail: THERAPIES SERIES
Discharge: HOME | End: 2022-10-19

## 2022-10-19 DIAGNOSIS — M54.50 CHRONIC BILATERAL LOW BACK PAIN WITHOUT SCIATICA: Primary | ICD-10-CM

## 2022-10-19 DIAGNOSIS — M25.551 RIGHT HIP PAIN: ICD-10-CM

## 2022-10-19 DIAGNOSIS — M54.50 LOW BACK PAIN, UNSPECIFIED BACK PAIN LATERALITY, UNSPECIFIED CHRONICITY, UNSPECIFIED WHETHER SCIATICA PRESENT: ICD-10-CM

## 2022-10-19 DIAGNOSIS — Z74.09 IMPAIRED MOBILITY: ICD-10-CM

## 2022-10-19 DIAGNOSIS — M54.2 CERVICALGIA: ICD-10-CM

## 2022-10-19 DIAGNOSIS — G89.29 CHRONIC BILATERAL LOW BACK PAIN WITHOUT SCIATICA: Primary | ICD-10-CM

## 2022-10-19 PROCEDURE — 97110 THERAPEUTIC EXERCISES: CPT

## 2022-10-19 NOTE — THERAPY TREATMENT NOTE
Outpatient Physical Therapy Ortho Treatment Note  [facility]     Patient Name: Suly Ware  : 1950  MRN: [id number]  Today's Date: 10/19/2022      Visit Date: 10/19/2022    Visit Dx:    ICD-10-CM ICD-9-CM   1. Chronic bilateral low back pain without sciatica  M54.50 724.2    G89.29 338.29   2. Cervicalgia  M54.2 723.1   3. Impaired mobility  Z74.09 799.89   4. Right hip pain  M25.551 719.45   5. Low back pain, unspecified back pain laterality, unspecified chronicity, unspecified whether sciatica present  M54.50 724.2       Patient Active Problem List   Diagnosis   • Headache, migraine   • DVT (deep venous thrombosis) ([facility])   • Chronic right shoulder pain   • Influenza   • Chronic fatigue   • Family history of breast cancer- paternal aunt age 39, pt is BRCA neg.   • Rosacea   • Osteopenia   • Depression   • Screen for colon cancer   • LGSIL on Pap smear of cervix   • Recurrent vaginitis   • Benign essential microscopic hematuria        Past Medical History:   Diagnosis Date   • Acute serous otitis media    • Acute sinusitis    • Allergic     Pollen, sulfites   • Anxiety uray    • Arthralgia    • Colon polyp    • Depression    • DVT of lower extremity (deep venous thrombosis) (HCC)    • LGSIL on Pap smear of cervix 2019   • Migraine headache    • Osteopenia    • PFO (patent foramen ovale)    • Profound fatigue     when walking upstairs   • Pulmonary embolus (HCC)     from OCPs, thrombophilia w/u neg   • Pulmonary embolus (HCC) 2000    from OCPs, thrombophilia w/u neg   • Shoulder pain         Past Surgical History:   Procedure Laterality Date   • APPENDECTOMY     •  SECTION      x 2   • COLONOSCOPY     • COLONOSCOPY N/A 2019    Procedure: COLONOSCOPY INTO CECUM & TERMINAL ILEUM  WITH COLD SNARE POLYPECTOMY;  Surgeon: Brandon Nielsen MD;  Location: [facility] ENDOSCOPY;  Service: Gastroenterology   • DILATATION AND CURETTAGE     • EXPLORATORY LAPAROTOMY       dermoid removed   • HX OVARIAN CYSTECTOMY     • TUBAL ABDOMINAL LIGATION          PT Ortho     Row Name 10/19/22 0900       Right Upper Ext    Rt Shoulder Abduction AROM 132 seated  -CN    Rt Shoulder Flexion AROM 157 seated  -CN          User Key  (r) = Recorded By, (t) = Taken By, (c) = Cosigned By    Initials Name Provider Type    Ludy Nichols, PT Physical Therapist                             PT Assessment/Plan     Row Name 10/19/22 0954          PT Assessment    Assessment Comments Pt reporting low pain and stiffness in neck/shoulder. Pt continues waking 3-4x/night and has not attempted yoga at this time. Discussed modified yoga positions as well as important of chin tuck while against gravity (forward flex or quad positions). Pt to trial gentle yoga and return to rowing machine by next visit to assess response. Pt likely nearing readiness for PT D/C in next 2-3 visits pending no increase in symptoms with return of recreational tasks.  -CN        PT Plan    PT Plan Comments Assess response to added exrecises and follow up regarding yoga and rowing. Continue to work against gravity as tolerated. Consider prone chin tuck and Is, Ts, Ys next visit.  -CN           User Key  (r) = Recorded By, (t) = Taken By, (c) = Cosigned By    Initials Name Provider Type    Ludy Nichols, PT Physical Therapist                   OP Exercises     Row Name 10/19/22 0900             Subjective Comments    Subjective Comments I have stiffness in the neck and the shoulder just feels like it doesn't move the way it used to. I have the most trouble at night.  -CN         Subjective Pain    Able to rate subjective pain? yes  -CN      Pre-Treatment Pain Level 0  -CN         Total Minutes    74032 - PT Therapeutic Exercise Minutes 40  -CN         Exercise 1    Exercise Name 1 Seated SNAG  -CN      Cueing 1 Verbal;Demo  -CN      Reps 1 10B  -CN      Time 1 5-10s  -CN         Exercise 2    Exercise Name 2 Wall  wash  -CN      Cueing 2 Verbal;Demo  -CN      Sets 2 1  -CN      Reps 2 10  -CN      Time 2 5 sec  -CN         Exercise 3    Exercise Name 3 Quadruped chin tuck  -CN      Cueing 3 Verbal;Demo  -CN      Sets 3 1  -CN      Reps 3 10  -CN      Time 3 5 sec  -CN      Additional Comments good form  -CN         Exercise 4    Exercise Name 4 HA/ER with noodle on wall  -CN      Cueing 4 Verbal;Demo  -CN      Sets 4 --  -CN      Reps 4 10 ea  -CN      Additional Comments GTB, cuing for chin tuck, but corrects easily  -CN         Exercise 5    Exercise Name 5 seated UT/levator stretch  -CN      Reps 5 3  -CN      Time 5 20 s  -CN         Exercise 8    Exercise Name 8 Nustep L5 UE/LE  -CN      Cueing 8 Verbal;Demo  -CN      Time 8 5 min  -CN         Exercise 9    Exercise Name 9 open book in high plank on wall  -CN      Cueing 9 Verbal;Demo  -CN      Reps 9 10 B  -CN         Exercise 16    Exercise Name 16 Bent over rows with chin tuck  -CN      Cueing 16 Verbal;Demo  -CN      Reps 16 15  -CN      Additional Comments 3#, cues for chin tuck, TA, neutral spine/hip hinge  -CN         Exercise 17    Exercise Name 17 Time spent discussing POC, expectations, return to yoga/rowing  -CN            User Key  (r) = Recorded By, (t) = Taken By, (c) = Cosigned By    Initials Name Provider Type    Ludy Nichols, PT Physical Therapist                              PT OP Goals     Row Name 10/19/22 0900          PT Short Term Goals    STG Date to Achieve 10/07/22  -CN     STG 1 pt. to be I with initial HEP to facilitate self management of their condition  -CN     STG 1 Progress Met  -CN     STG 2 pt. to be educated in/verbalize understanding of the importance of posture/ergonomics in association with their condition to facilitate self management of their condition  -CN     STG 2 Progress Met  -CN     STG 3 pt to report sleeping through the night without interruptions secondary to pain to facilitate optimal restorative sleep  -CN      STG 3 Progress Partially Met  -CN     STG 3 Progress Comments Waking 3-4x/night with discomfort, cautious with turning in bed.  -CN        Long Term Goals    LTG Date to Achieve 12/09/22  -CN     LTG 1 pt. to be I with advanced HEP to facilitate self management of their condition  -CN     LTG 1 Progress Ongoing  -CN     LTG 2 pt. to report an Oswestry </= 20% to demonstrate decreased level of perceived disability  -CN     LTG 2 Progress Met  -CN     LTG 3 pt. to ascend/descends stairs with reciprocal pattern (</= 1 rails) to facilitate ease/safety of household/community mobility  -CN     LTG 3 Progress Met  -CN     LTG 4 pt to demonstrate R hip abd MMT >/=4+/5 to facilitate ease with performing fitness/leisure activities  -CN     LTG 4 Progress Met  -CN     LTG 5 pt to report inititing regular fitness activities (pilaties, yoga, etc) to facilitate optimal health  -CN     LTG 5 Progress Ongoing  -CN     LTG 5 Progress Comments Pt has not previously done yoga but interested in starting.  -CN     LTG 6 pt to demonstrate R shoulder AROM flexion/abduction >/= 160 to facilitate ease with household/dressing activities  -CN     LTG 6 Progress Ongoing  -CN           User Key  (r) = Recorded By, (t) = Taken By, (c) = Cosigned By    Initials Name Provider Type    Ludy Nichols, PT Physical Therapist                Therapy Education  Given: HEP, Posture/body mechanics, Symptoms/condition management  Program: Reinforced, Progressed  How Provided: Verbal, Demonstration  Provided to: Patient  Level of Understanding: Teach back education performed              Time Calculation:   Start Time: 0910  Stop Time: 0950  Time Calculation (min): 40 min  Timed Charges  62236 - PT Therapeutic Exercise Minutes: 40  Total Minutes  Timed Charges Total Minutes: 40   Total Minutes: 40  Therapy Charges for Today     Code Description Service Date Service Provider Modifiers Qty    31249594743 HC PT THER PROC EA 15 MIN 10/19/2022  Prisca, Ludy Julian, PT GP 3                    Ludy Cope, PT  10/19/2022

## 2022-10-26 ENCOUNTER — HOSPITAL ENCOUNTER (OUTPATIENT)
Dept: PHYSICAL THERAPY | Facility: HOSPITAL | Age: 72
Setting detail: THERAPIES SERIES
Discharge: HOME | End: 2022-10-26

## 2022-10-26 DIAGNOSIS — M54.2 CERVICALGIA: ICD-10-CM

## 2022-10-26 DIAGNOSIS — M25.551 RIGHT HIP PAIN: ICD-10-CM

## 2022-10-26 DIAGNOSIS — M54.50 LOW BACK PAIN, UNSPECIFIED BACK PAIN LATERALITY, UNSPECIFIED CHRONICITY, UNSPECIFIED WHETHER SCIATICA PRESENT: ICD-10-CM

## 2022-10-26 DIAGNOSIS — G89.29 CHRONIC BILATERAL LOW BACK PAIN WITHOUT SCIATICA: Primary | ICD-10-CM

## 2022-10-26 DIAGNOSIS — Z74.09 IMPAIRED MOBILITY: ICD-10-CM

## 2022-10-26 DIAGNOSIS — M54.50 CHRONIC BILATERAL LOW BACK PAIN WITHOUT SCIATICA: Primary | ICD-10-CM

## 2022-10-26 PROCEDURE — 97140 MANUAL THERAPY 1/> REGIONS: CPT

## 2022-10-26 PROCEDURE — 97110 THERAPEUTIC EXERCISES: CPT

## 2022-10-26 NOTE — THERAPY TREATMENT NOTE
Outpatient Physical Therapy Ortho Treatment Note  [facility]     Patient Name: Suly Ware  : 1950  MRN: [id number]  Today's Date: 10/26/2022      Visit Date: 10/26/2022    Visit Dx:    ICD-10-CM ICD-9-CM   1. Chronic bilateral low back pain without sciatica  M54.50 724.2    G89.29 338.29   2. Cervicalgia  M54.2 723.1   3. Impaired mobility  Z74.09 799.89   4. Right hip pain  M25.551 719.45   5. Low back pain, unspecified back pain laterality, unspecified chronicity, unspecified whether sciatica present  M54.50 724.2       Patient Active Problem List   Diagnosis   • Headache, migraine   • DVT (deep venous thrombosis) ([facility])   • Chronic right shoulder pain   • Influenza   • Chronic fatigue   • Family history of breast cancer- paternal aunt age 39, pt is BRCA neg.   • Rosacea   • Osteopenia   • Depression   • Screen for colon cancer   • LGSIL on Pap smear of cervix   • Recurrent vaginitis   • Benign essential microscopic hematuria        Past Medical History:   Diagnosis Date   • Acute serous otitis media    • Acute sinusitis    • Allergic     Pollen, sulfites   • Anxiety uray    • Arthralgia    • Colon polyp    • Depression    • DVT of lower extremity (deep venous thrombosis) (HCC)    • LGSIL on Pap smear of cervix 2019   • Migraine headache    • Osteopenia    • PFO (patent foramen ovale)    • Profound fatigue     when walking upstairs   • Pulmonary embolus (HCC)     from OCPs, thrombophilia w/u neg   • Pulmonary embolus (HCC) 2000    from OCPs, thrombophilia w/u neg   • Shoulder pain         Past Surgical History:   Procedure Laterality Date   • APPENDECTOMY     •  SECTION      x 2   • COLONOSCOPY     • COLONOSCOPY N/A 2019    Procedure: COLONOSCOPY INTO CECUM & TERMINAL ILEUM  WITH COLD SNARE POLYPECTOMY;  Surgeon: Brandon Nielsen MD;  Location: [facility] ENDOSCOPY;  Service: Gastroenterology   • DILATATION AND CURETTAGE     • EXPLORATORY LAPAROTOMY       dermoid removed   • HX OVARIAN CYSTECTOMY     • TUBAL ABDOMINAL LIGATION                          PT Assessment/Plan     Row Name 10/26/22 0855          PT Assessment    Assessment Comments Pt reporting onset of thoracic pain since last visit, unsure of cause, however did trial rowing machine over the weekend. Pt also spent time in the car which has minimal lumbar support. Performed manual stm and PA mobs with good response and several cavitations. Added thoracic ext over chair in clinic and to HEP and will continue to monitor progress/continued thoracic pain next visit.  -CN        PT Plan    PT Plan Comments Assess response to thoracic pain and continue to progress thoracic mobility as tolerated.  -CN           User Key  (r) = Recorded By, (t) = Taken By, (c) = Cosigned By    Initials Name Provider Type    Ludy Nichols, PT Physical Therapist                   OP Exercises     Row Name 10/26/22 0700             Subjective Comments    Subjective Comments My midback has been hurting since Monday. I don't know what I did. I did the rower on Saturday and then drove an old car. My neck and back are ok, not what they were 6 months ago though.  -CN         Subjective Pain    Able to rate subjective pain? yes  -CN      Pre-Treatment Pain Level 7  -CN         Total Minutes    65582 - PT Therapeutic Exercise Minutes 30  -CN      26367 - PT Manual Therapy Minutes 10  -CN         Exercise 2    Exercise Name 2 Wall wash  -CN      Cueing 2 Verbal;Demo  -CN      Sets 2 1  -CN      Reps 2 10  -CN      Time 2 5 sec  -CN         Exercise 4    Exercise Name 4 HA/ER with noodle on wall  -CN      Cueing 4 Verbal;Demo  -CN      Reps 4 10 ea  -CN      Additional Comments GTB, cuing for chin tuck, but corrects easily  -CN         Exercise 7    Exercise Name 7 LTR  -CN      Cueing 7 Verbal;Demo  -CN      Reps 7 10  -CN      Time 7 5 sec  -CN         Exercise 8    Exercise Name 8 Nustep L5 UE/LE  -CN      Cueing 8  Verbal;Demo  -CN      Time 8 5 min  -CN         Exercise 9    Exercise Name 9 Open book stretch  -CN      Cueing 9 Verbal;Demo  -CN      Reps 9 10 B  -CN         Exercise 10    Exercise Name 10 Thoracic ext with towel seated  in chair  -CN      Cueing 10 Verbal;Demo  -CN      Reps 10 10  -CN      Time 10 5 sec  -CN         Exercise 13    Exercise Name 13 Supine over noodle: alt UE flex  -CN      Cueing 13 Verbal;Demo  -CN      Reps 13 10  -CN         Exercise 14    Exercise Name 14 Doorway lat stretch  -CN      Cueing 14 Verbal;Demo  -CN      Reps 14 320 sec  -CN            User Key  (r) = Recorded By, (t) = Taken By, (c) = Cosigned By    Initials Name Provider Type    Luyd Nichols, PT Physical Therapist                         Manual Rx (last 36 hours)     Manual Treatments     Row Name 10/26/22 0700             Total Minutes    75803 - PT Manual Therapy Minutes 10  -CN         Manual Rx 1    Manual Rx 1 Location SL stm rhomboids/middle trap; prone PA mobs throacic spine  -CN            User Key  (r) = Recorded By, (t) = Taken By, (c) = Cosigned By    Initials Name Provider Type    Ludy Nichols, PT Physical Therapist                 PT OP Goals     Row Name 10/26/22 0800          PT Short Term Goals    STG Date to Achieve 10/07/22  -CN     STG 1 pt. to be I with initial HEP to facilitate self management of their condition  -CN     STG 1 Progress Met  -CN     STG 2 pt. to be educated in/verbalize understanding of the importance of posture/ergonomics in association with their condition to facilitate self management of their condition  -CN     STG 2 Progress Met  -CN     STG 3 pt to report sleeping through the night without interruptions secondary to pain to facilitate optimal restorative sleep  -CN     STG 3 Progress Partially Met  -CN        Long Term Goals    LTG Date to Achieve 12/09/22  -CN     LTG 1 pt. to be I with advanced HEP to facilitate self management of their condition  -CN      LTG 1 Progress Ongoing  -CN     LTG 1 Progress Comments Progressing strengthening as tolerated.  -CN     LTG 2 pt. to report an Oswestry </= 20% to demonstrate decreased level of perceived disability  -CN     LTG 2 Progress Met  -CN     LTG 3 pt. to ascend/descends stairs with reciprocal pattern (</= 1 rails) to facilitate ease/safety of household/community mobility  -CN     LTG 3 Progress Met  -CN     LTG 4 pt to demonstrate R hip abd MMT >/=4+/5 to facilitate ease with performing fitness/leisure activities  -CN     LTG 4 Progress Met  -CN     LTG 5 pt to report inititing regular fitness activities (pilaties, yoga, etc) to facilitate optimal health  -CN     LTG 5 Progress Ongoing  -CN     LTG 6 pt to demonstrate R shoulder AROM flexion/abduction >/= 160 to facilitate ease with household/dressing activities  -CN     LTG 6 Progress Ongoing  -CN           User Key  (r) = Recorded By, (t) = Taken By, (c) = Cosigned By    Initials Name Provider Type    Ludy Nichols, PT Physical Therapist                Therapy Education  Given: HEP, Posture/body mechanics, Symptoms/condition management  Program: Reinforced, Progressed  How Provided: Verbal, Demonstration  Provided to: Patient  Level of Understanding: Teach back education performed              Time Calculation:   Start Time: 0748  Stop Time: 0828  Time Calculation (min): 40 min  Timed Charges  61226 - PT Therapeutic Exercise Minutes: 30  78226 - PT Manual Therapy Minutes: 10  Total Minutes  Timed Charges Total Minutes: 40   Total Minutes: 40  Therapy Charges for Today     Code Description Service Date Service Provider Modifiers Qty    39914773522 HC PT THER PROC EA 15 MIN 10/26/2022 Ludy Cope PT GP 2    25538987306 HC PT MANUAL THERAPY EA 15 MIN 10/26/2022 Lduy Cope PT GP 1                    Ludy Cope PT  10/26/2022

## 2022-11-09 ENCOUNTER — HOSPITAL ENCOUNTER (OUTPATIENT)
Dept: PHYSICAL THERAPY | Facility: HOSPITAL | Age: 72
Setting detail: THERAPIES SERIES
Discharge: HOME | End: 2022-11-09

## 2022-11-09 DIAGNOSIS — M54.50 LOW BACK PAIN, UNSPECIFIED BACK PAIN LATERALITY, UNSPECIFIED CHRONICITY, UNSPECIFIED WHETHER SCIATICA PRESENT: ICD-10-CM

## 2022-11-09 DIAGNOSIS — M54.50 CHRONIC BILATERAL LOW BACK PAIN WITHOUT SCIATICA: Primary | ICD-10-CM

## 2022-11-09 DIAGNOSIS — M54.2 CERVICALGIA: ICD-10-CM

## 2022-11-09 DIAGNOSIS — M25.551 RIGHT HIP PAIN: ICD-10-CM

## 2022-11-09 DIAGNOSIS — Z74.09 IMPAIRED MOBILITY: ICD-10-CM

## 2022-11-09 DIAGNOSIS — G89.29 CHRONIC BILATERAL LOW BACK PAIN WITHOUT SCIATICA: Primary | ICD-10-CM

## 2022-11-09 PROCEDURE — 97110 THERAPEUTIC EXERCISES: CPT

## 2022-11-09 NOTE — THERAPY DISCHARGE NOTE
Outpatient Physical Therapy Ortho Treatment Note/Discharge Summary   [facility]     Patient Name: Suly Ware  : 1950  MRN: [id number]  Today's Date: 2022      Visit Date: 2022    Visit Dx:    ICD-10-CM ICD-9-CM   1. Chronic bilateral low back pain without sciatica  M54.50 724.2    G89.29 338.29   2. Cervicalgia  M54.2 723.1   3. Impaired mobility  Z74.09 799.89   4. Right hip pain  M25.551 719.45   5. Low back pain, unspecified back pain laterality, unspecified chronicity, unspecified whether sciatica present  M54.50 724.2       Patient Active Problem List   Diagnosis   • Headache, migraine   • DVT (deep venous thrombosis) ([facility])   • Chronic right shoulder pain   • Influenza   • Chronic fatigue   • Family history of breast cancer- paternal aunt age 39, pt is BRCA neg.   • Rosacea   • Osteopenia   • Depression   • Screen for colon cancer   • LGSIL on Pap smear of cervix   • Recurrent vaginitis   • Benign essential microscopic hematuria        Past Medical History:   Diagnosis Date   • Acute serous otitis media    • Acute sinusitis    • Allergic     Pollen, sulfites   • Anxiety uray    • Arthralgia    • Colon polyp    • Depression    • DVT of lower extremity (deep venous thrombosis) (HCC)    • LGSIL on Pap smear of cervix 2019   • Migraine headache    • Osteopenia    • PFO (patent foramen ovale)    • Profound fatigue     when walking upstairs   • Pulmonary embolus (HCC)     from OCPs, thrombophilia w/u neg   • Pulmonary embolus (HCC) 2000    from OCPs, thrombophilia w/u neg   • Shoulder pain         Past Surgical History:   Procedure Laterality Date   • APPENDECTOMY     •  SECTION      x 2   • COLONOSCOPY     • COLONOSCOPY N/A 2019    Procedure: COLONOSCOPY INTO CECUM & TERMINAL ILEUM  WITH COLD SNARE POLYPECTOMY;  Surgeon: Brandon Nielsen MD;  Location: [facility] ENDOSCOPY;  Service: Gastroenterology   • DILATATION AND CURETTAGE     •  EXPLORATORY LAPAROTOMY      dermoid removed   • HX OVARIAN CYSTECTOMY     • TUBAL ABDOMINAL LIGATION                                  OP Exercises     Row Name 11/09/22 0900             Subjective Comments    Subjective Comments I don't know what you did last time but the pain was gone by the time I went to bed that night. I have been feeling good since then. I still get some neck pain at night but I just have to get up and move around.  -CN         Subjective Pain    Able to rate subjective pain? yes  -CN      Pre-Treatment Pain Level 0  -CN         Total Minutes    55625 - PT Therapeutic Exercise Minutes 40  -CN         Exercise 1    Exercise Name 1 Seated SNAG  -CN      Cueing 1 Verbal;Demo  -CN      Reps 1 10B  -CN      Time 1 5-10s  -CN         Exercise 2    Exercise Name 2 Wall wash  -CN      Cueing 2 Verbal;Demo  -CN      Sets 2 1  -CN      Reps 2 10  -CN      Time 2 5 sec  -CN         Exercise 3    Exercise Name 3 Quadruped chin tuck  -CN      Cueing 3 Verbal;Demo  -CN      Sets 3 1  -CN      Reps 3 10  -CN      Time 3 5 sec  -CN      Additional Comments good form  -CN         Exercise 5    Exercise Name 5 Discussed long term management of symtpoms, HEP, when to return to MD/PT  -CN         Exercise 8    Exercise Name 8 Nustep L5 UE/LE  -CN      Cueing 8 Verbal;Demo  -CN      Time 8 5 min  -CN            User Key  (r) = Recorded By, (t) = Taken By, (c) = Cosigned By    Initials Name Provider Type    Ludy Nichols, PT Physical Therapist                                PT OP Goals     Row Name 11/09/22 0900          PT Short Term Goals    STG Date to Achieve 10/07/22  -CN     STG 1 pt. to be I with initial HEP to facilitate self management of their condition  -CN     STG 1 Progress Met  -CN     STG 2 pt. to be educated in/verbalize understanding of the importance of posture/ergonomics in association with their condition to facilitate self management of their condition  -CN     STG 2 Progress Met   -CN     STG 3 pt to report sleeping through the night without interruptions secondary to pain to facilitate optimal restorative sleep  -CN     STG 3 Progress Partially Met  -CN        Long Term Goals    LTG Date to Achieve 12/09/22  -CN     LTG 1 pt. to be I with advanced HEP to facilitate self management of their condition  -CN     LTG 1 Progress Met  -CN     LTG 2 pt. to report an Oswestry </= 20% to demonstrate decreased level of perceived disability  -CN     LTG 2 Progress Met  -CN     LTG 3 pt. to ascend/descends stairs with reciprocal pattern (</= 1 rails) to facilitate ease/safety of household/community mobility  -CN     LTG 3 Progress Met  -CN     LTG 4 pt to demonstrate R hip abd MMT >/=4+/5 to facilitate ease with performing fitness/leisure activities  -CN     LTG 4 Progress Met  -CN     LTG 5 pt to report inititing regular fitness activities (pilaties, yoga, etc) to facilitate optimal health  -CN     LTG 5 Progress Partially Met  -CN     LTG 6 pt to demonstrate R shoulder AROM flexion/abduction >/= 160 to facilitate ease with household/dressing activities  -CN     LTG 6 Progress Met  -CN           User Key  (r) = Recorded By, (t) = Taken By, (c) = Cosigned By    Initials Name Provider Type    Ludy Nichols, PT Physical Therapist                Therapy Education  Given: HEP, Posture/body mechanics, Symptoms/condition management  Program: Reinforced, Progressed  How Provided: Verbal, Demonstration  Provided to: Patient  Level of Understanding: Teach back education performed              Time Calculation:   Start Time: 0920  Stop Time: 1000  Time Calculation (min): 40 min  Timed Charges  05315 - PT Therapeutic Exercise Minutes: 40  Total Minutes  Timed Charges Total Minutes: 40   Total Minutes: 40  Therapy Charges for Today     Code Description Service Date Service Provider Modifiers Qty    70057589709 HC PT THER PROC EA 15 MIN 11/9/2022 Ludy Cope, PT GP 3                OP PT  Discharge Summary  Date of Discharge: 11/09/22  Reason for Discharge: Maximum functional potential achieved, Independent  Outcomes Achieved: Patient able to partially acheive established goals  Discharge Destination: Home with home program  Discharge Instructions/Additional Comments: Pt attended skilled PT for treatment of cervical, thoracic and lumbar pain. Pt with significant reduction in symtpoms and compliant with HEP. Pt continues with cervical pain occationally at night, although low pain rating. Pt has returned to light yoga and rowing without recent exacerbation and plans to D/C to I management at this time.      Ludy Cope, PT  11/9/2022

## 2023-01-24 ENCOUNTER — OFFICE VISIT (OUTPATIENT)
Dept: OBSTETRICS AND GYNECOLOGY | Facility: CLINIC | Age: 73
End: 2023-01-24
Payer: MEDICARE

## 2023-01-24 VITALS
SYSTOLIC BLOOD PRESSURE: 120 MMHG | DIASTOLIC BLOOD PRESSURE: 80 MMHG | HEIGHT: 63 IN | WEIGHT: 170 LBS | BODY MASS INDEX: 30.12 KG/M2

## 2023-01-24 DIAGNOSIS — Z87.42 HISTORY OF ABNORMAL CERVICAL PAP SMEAR: ICD-10-CM

## 2023-01-24 DIAGNOSIS — Z01.419 WELL WOMAN EXAM WITH ROUTINE GYNECOLOGICAL EXAM: Primary | ICD-10-CM

## 2023-01-24 DIAGNOSIS — Z78.0 POSTMENOPAUSAL STATUS: ICD-10-CM

## 2023-01-24 PROCEDURE — G0101 CA SCREEN;PELVIC/BREAST EXAM: HCPCS | Performed by: STUDENT IN AN ORGANIZED HEALTH CARE EDUCATION/TRAINING PROGRAM

## 2023-01-24 NOTE — PROGRESS NOTES
1GYN Annual Exam     CC- Here for annual exam.     Suly Ware is a 72 y.o. female who presents for annual well woman exam. She has no complaints today. Denies vaginal bleeding or vasomotor symptoms. She has a few random episodes of urinary leakage that she wears a thin pad. Denies leakage of urine with coughing. She has been doing Kegel exercises regularly.      OB History        3    Para   2    Term   2            AB   1    Living   1       SAB   1    IAB        Ectopic        Molar        Multiple        Live Births              Obstetric Comments   2 C/S  1 SAB with D&C           Menarche age 16  Menopause: 55  Current contraception: post menopausal status  History of abnormal Pap smear: yes - LSIL pap in 2019 --> colposcopy with normal biopsy. Normal pap smear x 2 following abnormal and normal colposcopy.   Family history of uterine, colon or ovarian cancer: no  History of abnormal mammogram: no  Family history of breast cancer: yes - Maternal aunt with breast cancer at age 39. Patient has negative BRCA testing.   Last Pap : 11/15/21- NILM   Last mammogram: 22- BIRADS-1  Last colonoscopy: 2019- tubular adenoma; repeat in 5 years   Last DEXA: 2021- Osteopenia (osteoporotic fracture of 11% and a 10 year risk of hip fracture of 1.9%)  Parental Hip Fracture: mother had osteoporosis and fell and broke her back.     Past Medical History:   Diagnosis Date   • Acute serous otitis media    • Acute sinusitis    • Allergic     Pollen, sulfites   • Anxiety 2000   • Arthralgia    • Colon polyp    • Depression    • DVT of lower extremity (deep venous thrombosis) (HCC)    • LGSIL on Pap smear of cervix 2019   • Migraine headache    • Osteopenia    • PFO (patent foramen ovale)    • Profound fatigue     when walking upstairs   • Pulmonary embolus (HCC)     from OCPs, thrombophilia w/u neg   • Pulmonary embolus (HCC) 2000    from OCPs, thrombophilia w/u neg   • Shoulder pain         Past Surgical History:   Procedure Laterality Date   • APPENDECTOMY     •  SECTION      x 2   • COLONOSCOPY     • COLONOSCOPY N/A 2019    Procedure: COLONOSCOPY INTO CECUM & TERMINAL ILEUM  WITH COLD SNARE POLYPECTOMY;  Surgeon: Brandon Nielsen MD;  Location: [facility] ENDOSCOPY;  Service: Gastroenterology   • DILATATION AND CURETTAGE     • EXPLORATORY LAPAROTOMY      dermoid removed   • HX OVARIAN CYSTECTOMY     • TUBAL ABDOMINAL LIGATION           Current Outpatient Medications:   •  B Complex Vitamins (vitamin b complex) capsule capsule, Take  by mouth Daily., Disp: , Rfl:   •  calcium carbonate (OS-HENRY) 600 MG tablet, Take 600 mg by mouth Daily., Disp: , Rfl:   •  Cyanocobalamin (VITAMIN B-12 PO), Take 1 tablet by mouth Daily., Disp: , Rfl:   •  Magnesium 100 MG capsule, Take  by mouth., Disp: , Rfl:   •  metroNIDAZOLE (Metrogel) 1 % gel, Apply  topically to the appropriate area as directed Daily. (Patient taking differently: Apply  topically to the appropriate area as directed As Needed.), Disp: 1 tube, Rfl: 2  •  VITAMIN D PO, Take 1 tablet by mouth Daily., Disp: , Rfl:   •  phenazopyridine (Pyridium) 100 MG tablet, Take 1 tablet by mouth 3 (Three) Times a Day As Needed for Bladder Spasms (UTI symptoms)., Disp: 6 tablet, Rfl: 0    Allergies   Allergen Reactions   • Sulfites Anaphylaxis   • Bactrim [Sulfamethoxazole-Trimethoprim] Hives       Social History     Tobacco Use   • Smoking status: Former     Packs/day: 0.00     Years: 0.00     Pack years: 0.00     Types: Cigarettes     Start date:      Quit date:      Years since quittin.0   • Smokeless tobacco: Never   Vaping Use   • Vaping Use: Never used   Substance Use Topics   • Alcohol use: Yes     Alcohol/week: 2.0 standard drinks     Types: 1 Cans of beer, 1 Shots of liquor per week     Comment: social drinker   • Drug use: No       Family History   Problem Relation Age of Onset   • Stroke Mother    • Hyperlipidemia  "Mother    • Heart disease Mother    • Heart attack Mother    • Diabetes Mother    • Osteoporosis Mother    • Arthritis Mother    • Asthma Mother    • Depression Mother    • Hearing loss Mother    • Miscarriages / Stillbirths Mother    • Stroke Father    • Aortic aneurysm Father    • Deep vein thrombosis Father    • Stroke Brother    • Hyperlipidemia Brother    • Heart disease Brother    • Breast cancer Maternal Aunt    • No Known Problems Daughter    • No Known Problems Daughter    • Ovarian cancer Neg Hx    • Colon cancer Neg Hx    • Colon polyps Neg Hx        Review of Systems   All other systems reviewed and are negative.      /80   Ht 160 cm (62.99\")   Wt 77.1 kg (170 lb)   LMP  (LMP Unknown)   BMI 30.12 kg/m²     Physical Exam  Vitals reviewed.   Constitutional:       General: She is not in acute distress.     Appearance: She is obese.   HENT:      Head: Normocephalic and atraumatic.      Right Ear: External ear normal.      Left Ear: External ear normal.   Eyes:      Extraocular Movements: Extraocular movements intact.      Pupils: Pupils are equal, round, and reactive to light.   Cardiovascular:      Rate and Rhythm: Normal rate.   Pulmonary:      Effort: Pulmonary effort is normal. No respiratory distress.   Chest:   Breasts:     Right: No swelling, bleeding, inverted nipple, mass, nipple discharge, skin change or tenderness.      Left: No swelling, bleeding, inverted nipple, mass, nipple discharge, skin change or tenderness.   Abdominal:      General: There is no distension.      Palpations: Abdomen is soft.      Tenderness: There is no abdominal tenderness. There is no guarding or rebound.   Genitourinary:     General: Normal vulva.      Exam position: Lithotomy position.      Labia:         Right: No rash, tenderness, lesion or injury.         Left: No rash, tenderness, lesion or injury.       Urethra: No prolapse or urethral swelling.      Vagina: No vaginal discharge, erythema, tenderness, " bleeding or lesions.      Cervix: Normal.      Uterus: Not enlarged, not fixed and not tender.       Adnexa:         Right: No mass, tenderness or fullness.          Left: No mass, tenderness or fullness.     Musculoskeletal:         General: No deformity. Normal range of motion.      Cervical back: Normal range of motion and neck supple.   Lymphadenopathy:      Upper Body:      Right upper body: No supraclavicular or axillary adenopathy.      Left upper body: No supraclavicular or axillary adenopathy.      Lower Body: No right inguinal adenopathy. No left inguinal adenopathy.   Skin:     General: Skin is warm and dry.   Neurological:      General: No focal deficit present.      Mental Status: She is alert and oriented to person, place, and time.   Psychiatric:         Mood and Affect: Mood normal.         Behavior: Behavior normal.       Assessment     1) GYN annual well woman exam.   2) Postmenopausal status   3) History of abnormal pap smear      Plan     1) Breast Health - Clinical breast exam & mammogram yearly, Self breast awareness monthly.   2) Pap - Up to date. Patient has history of abnormal pap smear (LSIL) in 2019 with benign biopsies from colposcopy followed by 2 negative pap smears. Recommend repeat pap smear next year as it will be 5 years from previous abnormal and if normal, no further screening indicated unless she develops concerning symptoms.   3) Smoking status- former smoker.   4) Colon health - screening colonoscopy recommended if not up to date  5) Bone health - Weight bearing exercise, dietary calcium recommendations and vitamin D reviewed. Will plan to order repeat DEXA screening next year when patient is a little over 2 years from previous exam in 12/2021.   6) Activity recommends - Adult 150-300 min/week of multi-component physical activities that include balance training, aerobic and physical strengthening.    7) Follow up prn and 1-2 years (early for pap smear)       Sandra Vaz  MD  1/24/2023  21:25 EST

## 2023-01-26 ENCOUNTER — PATIENT ROUNDING (BHMG ONLY) (OUTPATIENT)
Dept: OBSTETRICS AND GYNECOLOGY | Facility: CLINIC | Age: 73
End: 2023-01-26
Payer: MEDICARE

## 2023-01-26 ENCOUNTER — PATIENT MESSAGE (OUTPATIENT)
Dept: OBSTETRICS AND GYNECOLOGY | Facility: CLINIC | Age: 73
End: 2023-01-26
Payer: MEDICARE

## 2023-01-26 NOTE — PROGRESS NOTES
My chart message has been sent to the patient for PATIENT ROUNDING with [facility] [place].

## 2023-02-07 ENCOUNTER — OFFICE VISIT (OUTPATIENT)
Dept: FAMILY MEDICINE CLINIC | Facility: CLINIC | Age: 73
End: 2023-02-07
Payer: MEDICARE

## 2023-02-07 VITALS
OXYGEN SATURATION: 100 % | HEART RATE: 62 BPM | HEIGHT: 62 IN | WEIGHT: 174.1 LBS | DIASTOLIC BLOOD PRESSURE: 67 MMHG | TEMPERATURE: 96 F | BODY MASS INDEX: 32.04 KG/M2 | SYSTOLIC BLOOD PRESSURE: 147 MMHG

## 2023-02-07 DIAGNOSIS — R53.82 CHRONIC FATIGUE: ICD-10-CM

## 2023-02-07 DIAGNOSIS — E78.2 MIXED HYPERLIPIDEMIA: ICD-10-CM

## 2023-02-07 DIAGNOSIS — R73.9 HYPERGLYCEMIA: Primary | ICD-10-CM

## 2023-02-07 PROCEDURE — 99213 OFFICE O/P EST LOW 20 MIN: CPT | Performed by: FAMILY MEDICINE

## 2023-02-07 RX ORDER — SODIUM PHOSPHATE,MONO-DIBASIC 19G-7G/118
ENEMA (ML) RECTAL
COMMUNITY

## 2023-02-07 NOTE — PROGRESS NOTES
"Chief Complaint  Hyperglycemia (6 mo fu)    Subjective        Suly Ware presents to [facility]  History of Present Illness follow-up on hyperglycemia and hyperlipidemia.  Patient complaining of increased stress.  Her  is going for radiation therapy for prostate cancer.    Objective   Vital Signs:  /67   Pulse 62   Temp 96 °F (35.6 °C) (Temporal)   Ht 157.5 cm (62\")   Wt 79 kg (174 lb 1.6 oz)   SpO2 100%   BMI 31.84 kg/m²   Estimated body mass index is 31.84 kg/m² as calculated from the following:    Height as of this encounter: 157.5 cm (62\").    Weight as of this encounter: 79 kg (174 lb 1.6 oz).             Physical Exam  Constitutional:       General: She is not in acute distress.     Appearance: Normal appearance. She is well-developed.   HENT:      Head: Normocephalic and atraumatic.      Right Ear: Tympanic membrane normal.      Left Ear: Tympanic membrane normal.      Mouth/Throat:      Mouth: Mucous membranes are moist.   Eyes:      General:         Right eye: No discharge.         Left eye: No discharge.      Extraocular Movements: Extraocular movements intact.      Pupils: Pupils are equal, round, and reactive to light.   Cardiovascular:      Rate and Rhythm: Normal rate and regular rhythm.      Pulses: Normal pulses.      Heart sounds: Normal heart sounds.   Pulmonary:      Effort: Pulmonary effort is normal.      Breath sounds: Normal breath sounds. No wheezing or rales.   Abdominal:      General: Bowel sounds are normal.      Palpations: Abdomen is soft. There is no mass.      Tenderness: There is no abdominal tenderness.   Musculoskeletal:      Cervical back: Normal range of motion and neck supple.      Right lower leg: No edema.      Left lower leg: No edema.   Lymphadenopathy:      Cervical: No cervical adenopathy.   Neurological:      General: No focal deficit present.      Mental Status: She is alert and oriented to person, place, and time. "        Result Review :                   Assessment and Plan   Diagnoses and all orders for this visit:    1. Hyperglycemia (Primary)  -     Hemoglobin A1c  -     Hemoglobin A1c; Future    2. Mixed hyperlipidemia  -     Lipid Panel  -     Comprehensive Metabolic Panel; Future  -     Lipid Panel; Future    3. Chronic fatigue  -     CBC & Differential; Future  -     TSH+Free T4; Future      Suly Ware is a 72-year-old female patient came here for follow-up on   Hyperglycemia, we will check hemoglobin A1c low-carb diet recommended to patient.  Hyperlipidemia, we will check lipids today.  Follow-up as needed or in 6 months.  Will come for the labs before her appointment for Medicare wellness visit in 6-month             Follow Up   No follow-ups on file.  Patient was given instructions and counseling regarding her condition or for health maintenance advice. Please see specific information pulled into the AVS if appropriate.       Answers for HPI/ROS submitted by the patient on 2/7/2023  Please describe your symptoms.:  asked I come back in 6 months. Back pain, A1C.  Have you had these symptoms before?: Yes  How long have you been having these symptoms?: Greater than 2 weeks  Please list any medications you are currently taking for this condition.: Ibuprofen  What is the primary reason for your visit?: Other

## 2023-02-08 LAB
CHOLEST SERPL-MCNC: 224 MG/DL (ref 0–200)
HBA1C MFR BLD: 5.9 % (ref 4.8–5.6)
HDLC SERPL-MCNC: 73 MG/DL (ref 40–60)
LDLC SERPL CALC-MCNC: 138 MG/DL (ref 0–100)
TRIGL SERPL-MCNC: 73 MG/DL (ref 0–150)
VLDLC SERPL CALC-MCNC: 13 MG/DL (ref 5–40)

## 2023-07-31 DIAGNOSIS — E78.2 MIXED HYPERLIPIDEMIA: ICD-10-CM

## 2023-07-31 DIAGNOSIS — R73.9 HYPERGLYCEMIA: ICD-10-CM

## 2023-07-31 DIAGNOSIS — R53.82 CHRONIC FATIGUE: ICD-10-CM

## 2023-08-01 ENCOUNTER — LAB (OUTPATIENT)
Dept: LAB | Facility: HOSPITAL | Age: 73
End: 2023-08-01
Payer: MEDICARE

## 2023-08-01 LAB
ALBUMIN SERPL-MCNC: 3.5 G/DL (ref 3.5–5.2)
ALBUMIN/GLOB SERPL: 1.2 G/DL
ALP SERPL-CCNC: 79 U/L (ref 39–117)
ALT SERPL W P-5'-P-CCNC: 18 U/L (ref 1–33)
ANION GAP SERPL CALCULATED.3IONS-SCNC: 8.4 MMOL/L (ref 5–15)
AST SERPL-CCNC: 20 U/L (ref 1–32)
BASOPHILS # BLD AUTO: 0.07 10*3/MM3 (ref 0–0.2)
BASOPHILS NFR BLD AUTO: 0.9 % (ref 0–1.5)
BILIRUB SERPL-MCNC: 0.3 MG/DL (ref 0–1.2)
BUN SERPL-MCNC: 15 MG/DL (ref 8–23)
BUN/CREAT SERPL: 15.2 (ref 7–25)
CALCIUM SPEC-SCNC: 9.2 MG/DL (ref 8.6–10.5)
CHLORIDE SERPL-SCNC: 107 MMOL/L (ref 98–107)
CHOLEST SERPL-MCNC: 184 MG/DL (ref 0–200)
CO2 SERPL-SCNC: 25.6 MMOL/L (ref 22–29)
CREAT SERPL-MCNC: 0.99 MG/DL (ref 0.57–1)
DEPRECATED RDW RBC AUTO: 47.2 FL (ref 37–54)
EGFRCR SERPLBLD CKD-EPI 2021: 61.5 ML/MIN/1.73
EOSINOPHIL # BLD AUTO: 0.21 10*3/MM3 (ref 0–0.4)
EOSINOPHIL NFR BLD AUTO: 2.6 % (ref 0.3–6.2)
ERYTHROCYTE [DISTWIDTH] IN BLOOD BY AUTOMATED COUNT: 13.5 % (ref 12.3–15.4)
GLOBULIN UR ELPH-MCNC: 2.9 GM/DL
GLUCOSE SERPL-MCNC: 101 MG/DL (ref 65–99)
HBA1C MFR BLD: 5.7 % (ref 4.8–5.6)
HCT VFR BLD AUTO: 35.6 % (ref 34–46.6)
HDLC SERPL-MCNC: 60 MG/DL (ref 40–60)
HGB BLD-MCNC: 11.2 G/DL (ref 12–15.9)
IMM GRANULOCYTES # BLD AUTO: 0.02 10*3/MM3 (ref 0–0.05)
IMM GRANULOCYTES NFR BLD AUTO: 0.2 % (ref 0–0.5)
LDLC SERPL CALC-MCNC: 112 MG/DL (ref 0–100)
LDLC/HDLC SERPL: 1.85 {RATIO}
LYMPHOCYTES # BLD AUTO: 2.14 10*3/MM3 (ref 0.7–3.1)
LYMPHOCYTES NFR BLD AUTO: 26.6 % (ref 19.6–45.3)
MCH RBC QN AUTO: 29.9 PG (ref 26.6–33)
MCHC RBC AUTO-ENTMCNC: 31.5 G/DL (ref 31.5–35.7)
MCV RBC AUTO: 94.9 FL (ref 79–97)
MONOCYTES # BLD AUTO: 0.5 10*3/MM3 (ref 0.1–0.9)
MONOCYTES NFR BLD AUTO: 6.2 % (ref 5–12)
NEUTROPHILS NFR BLD AUTO: 5.1 10*3/MM3 (ref 1.7–7)
NEUTROPHILS NFR BLD AUTO: 63.5 % (ref 42.7–76)
NRBC BLD AUTO-RTO: 0 /100 WBC (ref 0–0.2)
PLATELET # BLD AUTO: 285 10*3/MM3 (ref 140–450)
PMV BLD AUTO: 8.7 FL (ref 6–12)
POTASSIUM SERPL-SCNC: 4.5 MMOL/L (ref 3.5–5.2)
PROT SERPL-MCNC: 6.4 G/DL (ref 6–8.5)
RBC # BLD AUTO: 3.75 10*6/MM3 (ref 3.77–5.28)
SODIUM SERPL-SCNC: 141 MMOL/L (ref 136–145)
T4 FREE SERPL-MCNC: 1.19 NG/DL (ref 0.93–1.7)
TRIGL SERPL-MCNC: 64 MG/DL (ref 0–150)
TSH SERPL DL<=0.05 MIU/L-ACNC: 1.87 UIU/ML (ref 0.27–4.2)
VLDLC SERPL-MCNC: 12 MG/DL (ref 5–40)
WBC NRBC COR # BLD: 8.04 10*3/MM3 (ref 3.4–10.8)

## 2023-08-01 PROCEDURE — 36415 COLL VENOUS BLD VENIPUNCTURE: CPT

## 2023-08-01 PROCEDURE — 84443 ASSAY THYROID STIM HORMONE: CPT | Performed by: FAMILY MEDICINE

## 2023-08-01 PROCEDURE — 83036 HEMOGLOBIN GLYCOSYLATED A1C: CPT | Performed by: FAMILY MEDICINE

## 2023-08-01 PROCEDURE — 80061 LIPID PANEL: CPT | Performed by: FAMILY MEDICINE

## 2023-08-01 PROCEDURE — 85025 COMPLETE CBC W/AUTO DIFF WBC: CPT | Performed by: FAMILY MEDICINE

## 2023-08-01 PROCEDURE — 84439 ASSAY OF FREE THYROXINE: CPT | Performed by: FAMILY MEDICINE

## 2023-08-01 PROCEDURE — 80053 COMPREHEN METABOLIC PANEL: CPT | Performed by: FAMILY MEDICINE

## 2023-08-07 ENCOUNTER — OFFICE VISIT (OUTPATIENT)
Dept: FAMILY MEDICINE CLINIC | Facility: CLINIC | Age: 73
End: 2023-08-07
Payer: MEDICARE

## 2023-08-07 VITALS
OXYGEN SATURATION: 99 % | HEART RATE: 68 BPM | WEIGHT: 173.1 LBS | SYSTOLIC BLOOD PRESSURE: 119 MMHG | HEIGHT: 62 IN | DIASTOLIC BLOOD PRESSURE: 60 MMHG | BODY MASS INDEX: 31.86 KG/M2 | TEMPERATURE: 97.1 F

## 2023-08-07 DIAGNOSIS — R73.9 HYPERGLYCEMIA: ICD-10-CM

## 2023-08-07 DIAGNOSIS — H91.90 DECREASED HEARING, UNSPECIFIED LATERALITY: ICD-10-CM

## 2023-08-07 DIAGNOSIS — M25.511 ACUTE PAIN OF BOTH SHOULDERS: ICD-10-CM

## 2023-08-07 DIAGNOSIS — E78.2 MIXED HYPERLIPIDEMIA: ICD-10-CM

## 2023-08-07 DIAGNOSIS — M25.512 ACUTE PAIN OF BOTH SHOULDERS: ICD-10-CM

## 2023-08-07 DIAGNOSIS — Z00.00 MEDICARE ANNUAL WELLNESS VISIT, SUBSEQUENT: Primary | ICD-10-CM

## 2023-08-07 DIAGNOSIS — D50.8 OTHER IRON DEFICIENCY ANEMIA: ICD-10-CM

## 2023-08-07 RX ORDER — FAMOTIDINE 40 MG/1
40 TABLET, FILM COATED ORAL DAILY
Qty: 90 TABLET | Refills: 1 | Status: SHIPPED | OUTPATIENT
Start: 2023-08-07

## 2023-08-07 NOTE — PROGRESS NOTES
The ABCs of the Annual Wellness Visit  Subsequent Medicare Wellness Visit    Subjective    Suly Ware is a 72 y.o. female who presents for a Subsequent Medicare Wellness Visit.    The following portions of the patient's history were reviewed and   updated as appropriate: allergies, current medications, past family history, past medical history, past social history, past surgical history, and problem list.    Compared to one year ago, the patient feels her physical   health is the same.    Compared to one year ago, the patient feels her mental   health is the same.    Recent Hospitalizations:  She was not admitted to the hospital during the last year.       Current Medical Providers:  Patient Care Team:  Roberta Vazquez MD as PCP - General (Family Medicine)    Outpatient Medications Prior to Visit   Medication Sig Dispense Refill    B Complex Vitamins (vitamin b complex) capsule capsule Take  by mouth Daily.      calcium carbonate (OS-HENRY) 600 MG tablet Take 1 tablet by mouth Daily.      Cyanocobalamin (VITAMIN B-12 PO) Take 1 tablet by mouth Daily.      glucosamine sulfate 500 MG capsule capsule Take  by mouth 3 (Three) Times a Day With Meals.      Magnesium 100 MG capsule Take  by mouth.      metroNIDAZOLE (Metrogel) 1 % gel Apply  topically to the appropriate area as directed Daily. (Patient taking differently: Apply  topically to the appropriate area as directed As Needed.) 1 tube 2    VITAMIN D PO Take 1 tablet by mouth Daily.       No facility-administered medications prior to visit.       No opioid medication identified on active medication list. I have reviewed chart for other potential  high risk medication/s and harmful drug interactions in the elderly.        Aspirin is not on active medication list.  Aspirin use is not indicated based on review of current medical condition/s. Risk of harm outweighs potential benefits.  .    Patient Active Problem List   Diagnosis    Headache, migraine    DVT  "(deep venous thrombosis)    Chronic right shoulder pain    Influenza    Chronic fatigue    Family history of breast cancer- paternal aunt age 39, pt is BRCA neg.    Rosacea    Osteopenia    Depression    Screen for colon cancer    LGSIL on Pap smear of cervix    Recurrent vaginitis    Benign essential microscopic hematuria     Advance Care Planning   Advance Care Planning     Advance Directive is not on file.  ACP discussion was held with the patient during this visit. Patient does not have an advance directive, information provided.     Objective    Vitals:    23 0834   BP: 119/60   Pulse: 68   Temp: 97.1 øF (36.2 øC)   TempSrc: Temporal   SpO2: 99%   Weight: 78.5 kg (173 lb 1.6 oz)   Height: 157.5 cm (62.01\")     Estimated body mass index is 31.65 kg/mý as calculated from the following:    Height as of this encounter: 157.5 cm (62.01\").    Weight as of this encounter: 78.5 kg (173 lb 1.6 oz).    BMI is >= 30 and <35. (Class 1 Obesity). The following options were offered after discussion;: weight loss educational material (shared in after visit summary), exercise counseling/recommendations, and nutrition counseling/recommendations      Does the patient have evidence of cognitive impairment? No    Lab Results   Component Value Date    TRIG 64 2023    HDL 60 2023     (H) 2023    VLDL 12 2023    HGBA1C 5.70 (H) 2023        HEALTH RISK ASSESSMENT    Smoking Status:  Social History     Tobacco Use   Smoking Status Former    Packs/day: 0.00    Years: 0.00    Pack years: 0.00    Types: Cigarettes    Start date: 1978    Quit date: 1985    Years since quittin.6   Smokeless Tobacco Never     Alcohol Consumption:  Social History     Substance and Sexual Activity   Alcohol Use Not Currently    Alcohol/week: 2.0 standard drinks    Types: 1 Cans of beer, 1 Shots of liquor per week    Comment: social drinker     Fall Risk Screen:    LESLYE Fall Risk Assessment was completed, " and patient is at LOW risk for falls.Assessment completed on:2023    Depression Screenin/7/2023     9:16 AM   PHQ-2/PHQ-9 Depression Screening   Little Interest or Pleasure in Doing Things 0-->not at all   Feeling Down, Depressed or Hopeless 0-->not at all   PHQ-9: Brief Depression Severity Measure Score 0       Health Habits and Functional and Cognitive Screenin/7/2023     8:32 AM   Functional & Cognitive Status   Do you have difficulty preparing food and eating? No   Do you have difficulty bathing yourself, getting dressed or grooming yourself? No   Do you have difficulty using the toilet? No   Do you have difficulty moving around from place to place? No   Do you have trouble with steps or getting out of a bed or a chair? No   Current Diet Well Balanced Diet   Dental Exam Up to date   Eye Exam Up to date   Exercise (times per week) 4 times per week   Current Exercises Include Pickleball   Do you need help using the phone?  No   Are you deaf or do you have serious difficulty hearing?  No   Do you need help to go to places out of walking distance? No   Do you need help shopping? No   Do you need help preparing meals?  No   Do you need help with housework?  No   Do you need help with laundry? No   Do you need help taking your medications? No   Do you need help managing money? No   Do you ever drive or ride in a car without wearing a seat belt? No   Have you felt unusual stress, anger or loneliness in the last month? No   Who do you live with? Spouse   If you need help, do you have trouble finding someone available to you? No   Have you been bothered in the last four weeks by sexual problems? No   Do you have difficulty concentrating, remembering or making decisions? No       Age-appropriate Screening Schedule:  Refer to the list below for future screening recommendations based on patient's age, sex and/or medical conditions. Orders for these recommended tests are listed in the plan section. The  "patient has been provided with a written plan.    Health Maintenance   Topic Date Due    COVID-19 Vaccine (6 - Pfizer series) 01/09/2023    INFLUENZA VACCINE  10/01/2023    DXA SCAN  12/02/2023    LIPID PANEL  08/01/2024    ANNUAL WELLNESS VISIT  08/07/2024    MAMMOGRAM  09/12/2024    PAP SMEAR  11/15/2024    COLORECTAL CANCER SCREENING  11/26/2024    TDAP/TD VACCINES (2 - Td or Tdap) 08/05/2029    HEPATITIS C SCREENING  Completed    Pneumococcal Vaccine 65+  Completed    ZOSTER VACCINE  Completed                  CMS Preventative Services Quick Reference  Risk Factors Identified During Encounter  Immunizations Discussed/Encouraged: COVID19  The above risks/problems have been discussed with the patient.  Pertinent information has been shared with the patient in the After Visit Summary.  An After Visit Summary and PPPS were made available to the patient.    Follow Up:   Next Medicare Wellness visit to be scheduled in 1 year.       Additional E&M Note during same encounter follows:  Patient has multiple medical problems which are significant and separately identifiable that require additional work above and beyond the Medicare Wellness Visit.      Chief Complaint  Hyperglycemia, Hyperlipidemia, Medicare Wellness-subsequent, and Shoulder Pain (New pain, B/L.  Trouble sleeping.)    Subjective        HPI  Suly Ware is also being seen today for follow-up on hyperlipidemia, hyperglycemia.  She is also complaining of shoulder pain.  Complaining of bilateral shoulder pain difficulty sleeping on that shoulder.         Objective   Vital Signs:  /60   Pulse 68   Temp 97.1 øF (36.2 øC) (Temporal)   Ht 157.5 cm (62.01\")   Wt 78.5 kg (173 lb 1.6 oz)   SpO2 99%   BMI 31.65 kg/mý     Physical Exam  Constitutional:       General: She is not in acute distress.     Appearance: Normal appearance. She is well-developed.   HENT:      Head: Normocephalic and atraumatic.      Right Ear: Tympanic membrane normal.      " Left Ear: Tympanic membrane normal.      Mouth/Throat:      Mouth: Mucous membranes are moist.   Eyes:      General:         Right eye: No discharge.         Left eye: No discharge.      Extraocular Movements: Extraocular movements intact.      Pupils: Pupils are equal, round, and reactive to light.   Cardiovascular:      Rate and Rhythm: Normal rate and regular rhythm.      Pulses: Normal pulses.      Heart sounds: Normal heart sounds.   Pulmonary:      Effort: Pulmonary effort is normal.      Breath sounds: Normal breath sounds. No wheezing or rales.   Abdominal:      General: Bowel sounds are normal.      Palpations: Abdomen is soft. There is no mass.      Tenderness: There is no abdominal tenderness.   Musculoskeletal:      Cervical back: Normal range of motion and neck supple.      Right lower leg: No edema.      Left lower leg: No edema.   Lymphadenopathy:      Cervical: No cervical adenopathy.   Neurological:      General: No focal deficit present.      Mental Status: She is alert and oriented to person, place, and time.   Psychiatric:         Mood and Affect: Mood normal.         Behavior: Behavior normal.          Common labs          2/7/2023    11:34 8/1/2023    08:01   Common Labs   Glucose  101    BUN  15    Creatinine  0.99    Sodium  141    Potassium  4.5    Chloride  107    Calcium  9.2    Albumin  3.5    Total Bilirubin  0.3    Alkaline Phosphatase  79    AST (SGOT)  20    ALT (SGPT)  18    WBC  8.04    Hemoglobin  11.2    Hematocrit  35.6    Platelets  285    Total Cholesterol  184    Total Cholesterol 224     Triglycerides 73  64    HDL Cholesterol 73  60    LDL Cholesterol  138  112    Hemoglobin A1C 5.90  5.70      A1C Last 3 Results          2/7/2023    11:34 8/1/2023    08:01   HGBA1C Last 3 Results   Hemoglobin A1C 5.90  5.70                 Assessment and Plan   Diagnoses and all orders for this visit:    1. Medicare annual wellness visit, subsequent (Primary)    2. Other iron deficiency  "anemia  -     Iron Profile  -     Ferritin  -     Vitamin B12 & Folate  -     Occult Blood, Fecal By Immunoassay - Stool, Per Rectum    3. Decreased hearing, unspecified laterality  -     Ambulatory Referral to Audiology    4. Mixed hyperlipidemia    5. Hyperglycemia    6. Acute pain of both shoulders    Other orders  -     famotidine (Pepcid) 40 MG tablet; Take 1 tablet by mouth Daily.  Dispense: 90 tablet; Refill: 1      Suly Ware \"Jessika\" is a 72-year-old female patient seen today for  Medicare annual wellness visit, preventive care discussed with patient.  Low-calorie diet .  Weightbearing exercise and calcium supplement recommended to her.  I will also refer to audiologist .  She is also seen today for  Hyperlipidemia, labs reviewed LDL HDL at goal continue same.  Hyperglycemia, labs reviewed with continue same.  Iron deficiency anemia, hemoglobin 11.2 noted no previous history of anemia.  I will check iron profile and also check Hemoccult for blood.  I will also check vitamin B12 and folic acid.    Bilateral shoulder pain: Discussed with patient she will try over-the-counter medication.         Follow Up   No follow-ups on file.  Patient was given instructions and counseling regarding her condition or for health maintenance advice. Please see specific information pulled into the AVS if appropriate.           "

## 2023-08-08 LAB
FERRITIN SERPL-MCNC: 82.4 NG/ML (ref 13–150)
FOLATE SERPL-MCNC: >20 NG/ML (ref 4.78–24.2)
IRON SATN MFR SERPL: 27 % (ref 20–50)
IRON SERPL-MCNC: 87 MCG/DL (ref 37–145)
TIBC SERPL-MCNC: 328 MCG/DL
UIBC SERPL-MCNC: 241 MCG/DL (ref 112–346)
VIT B12 SERPL-MCNC: 783 PG/ML (ref 211–946)

## 2023-11-02 ENCOUNTER — TELEPHONE (OUTPATIENT)
Dept: FAMILY MEDICINE CLINIC | Facility: CLINIC | Age: 73
End: 2023-11-02

## 2023-11-02 NOTE — TELEPHONE ENCOUNTER
"Caller: Suly Ware \"Jessika\"    Relationship: Self    Best call back number: [phone]    What specialty or service is being requested: DEXA BONE SCAN    What is the provider, practice or medical service name: LOCATION [facility]    Any additional details: PLEASE CALL ONCE SENT.  "

## 2023-11-09 DIAGNOSIS — Z78.0 POSTMENOPAUSAL: Primary | ICD-10-CM

## 2023-12-04 ENCOUNTER — HOSPITAL ENCOUNTER (OUTPATIENT)
Facility: HOSPITAL | Age: 73
Discharge: HOME | End: 2023-12-04
Admitting: FAMILY MEDICINE
Payer: MEDICARE

## 2023-12-04 DIAGNOSIS — Z78.0 POSTMENOPAUSAL: ICD-10-CM

## 2023-12-04 PROCEDURE — 77080 DXA BONE DENSITY AXIAL: CPT

## 2023-12-13 ENCOUNTER — OFFICE VISIT (OUTPATIENT)
Dept: FAMILY MEDICINE CLINIC | Facility: CLINIC | Age: 73
End: 2023-12-13
Payer: MEDICARE

## 2023-12-13 VITALS
BODY MASS INDEX: 31.3 KG/M2 | HEART RATE: 67 BPM | OXYGEN SATURATION: 97 % | TEMPERATURE: 98.5 F | WEIGHT: 170.1 LBS | HEIGHT: 62 IN | SYSTOLIC BLOOD PRESSURE: 130 MMHG | DIASTOLIC BLOOD PRESSURE: 78 MMHG

## 2023-12-13 DIAGNOSIS — M54.6 ACUTE MIDLINE THORACIC BACK PAIN: Primary | ICD-10-CM

## 2023-12-13 PROCEDURE — 99214 OFFICE O/P EST MOD 30 MIN: CPT | Performed by: INTERNAL MEDICINE

## 2023-12-13 PROCEDURE — 1159F MED LIST DOCD IN RCRD: CPT | Performed by: INTERNAL MEDICINE

## 2023-12-13 PROCEDURE — 1160F RVW MEDS BY RX/DR IN RCRD: CPT | Performed by: INTERNAL MEDICINE

## 2023-12-13 RX ORDER — METHYLPREDNISOLONE 4 MG/1
TABLET ORAL
Qty: 21 TABLET | Refills: 0 | Status: SHIPPED | OUTPATIENT
Start: 2023-12-13

## 2023-12-13 RX ORDER — DICLOFENAC SODIUM 75 MG/1
75 TABLET, DELAYED RELEASE ORAL 2 TIMES DAILY
Qty: 180 TABLET | Refills: 1 | Status: CANCELLED | OUTPATIENT
Start: 2023-12-13 | End: 2024-06-10

## 2023-12-13 NOTE — PROGRESS NOTES
"Chief Complaint  Back Pain (Ongoing.  Hips and Lower back.  Mid-back pain flared up.  Sharp pain . Pain is stabbing and burning sensation, right of spine.  Tried Tylenol Arthritis with no success.  Ibuprofen helps for 3 hrs only.  Wakes up in middle of night; this morning was worse its been.  Pain felt it was \"straight through body/chest\" under chest.  Pt had a fall 2 months ago playing pickleball; hips and back hit ground, head hit ground.  Friday the pain became more apparent.  No pain with urination.)    Subjective        HPI   Jessika presents to [facility] for a visit for back pain. She is new to me, normally sees Dr. Vazquez. She has known chronic bilateral lower back pain with DDD. Has undergone PT in the past. She previously was referred to pain management, discussion of referral to [facility] if no improvement.     She notes she has chronic back pain that occurs in different places  Intermittent flares  Continues to do PT exercises at home  Last Friday, back went out  Played pickleball, took advil   For last four days, mid-back just hurts  Pain is sharp and stabbing, middle to R thoracic area  No weakness or tingling  No dyspnea or coughing  Never has had shingles, no skin lesions  No radiation of pain  Has tried ice, APAP, Advil, not helping        Objective   Vital Signs:  Vitals:    12/13/23 1607   BP: 130/78   Pulse: 67   Temp: 98.5 °F (36.9 °C)   TempSrc: Temporal   SpO2: 97%   Weight: 77.2 kg (170 lb 1.6 oz)   Height: 157.5 cm (62.01\")          Physical Exam  Constitutional:       General: She is not in acute distress.     Appearance: Normal appearance.   HENT:      Head: Normocephalic and atraumatic.   Eyes:      Conjunctiva/sclera: Conjunctivae normal.   Cardiovascular:      Rate and Rhythm: Normal rate.   Pulmonary:      Effort: Pulmonary effort is normal. No respiratory distress.      Breath sounds: No wheezing or rhonchi.   Musculoskeletal:         General: No swelling, " tenderness or deformity.      Comments: No tenderness of T or L spine, no rib cage tenderness  Negative straight leg raise, mild hip flexor weakness which patient states is from chronic hip issues   Skin:     Coloration: Skin is not jaundiced.      Findings: No lesion or rash.   Neurological:      General: No focal deficit present.      Mental Status: She is alert and oriented to person, place, and time.   Psychiatric:         Mood and Affect: Mood normal.         Behavior: Behavior normal.         Judgment: Judgment normal.          Result Review :     The following data was reviewed by: Ayanna Farias MD on 12/13/2023:  MRI Lumbar Spine Without Contrast (09/12/2022 08:18)   XR Spine Thoracic 3 View (In Office) (07/01/2021 10:11)          Assessment and Plan    Diagnoses and all orders for this visit:    1. Acute midline thoracic back pain (Primary)  -     methylPREDNISolone (MEDROL) 4 MG dose pack; Take as directed on package instructions.  Dispense: 21 tablet; Refill: 0  -     diclofenac (VOLTAREN) 50 MG EC tablet; Take 1 tablet by mouth 2 (Two) Times a Day As Needed (pain).  Dispense: 14 tablet; Refill: 0    Patient with acute thoracic back pain.  No red flag symptoms at this time, but her pain is affecting quality of life and she is hoping for symptomatic relief.  Do not recommend NSAIDs long-term, but in the short-term, prescribed diclofenac twice daily as needed and a short course of steroids to hopefully help with the inflammation.  Renal function historically has been normal, cautioned patient on risk of GI side effects (ulcers, GIB, abd discomfort) with prescribed medications.  I told the patient that if the pain persists or worsens, I recommend additional workup and tx with MRI spine and physical therapy.  I considered that she could have a pulmonary process causing referred pain, but she has no cough, dyspnea, chest pain.  No skin lesions to suggest something like shingles.      Follow Up   Return if  symptoms worsen or fail to improve.  Patient was given instructions and counseling regarding her condition or for health maintenance advice. Please see specific information pulled into the AVS if appropriate.

## 2024-03-14 ENCOUNTER — TELEPHONE (OUTPATIENT)
Dept: FAMILY MEDICINE CLINIC | Facility: CLINIC | Age: 74
End: 2024-03-14
Payer: MEDICARE

## 2024-03-14 DIAGNOSIS — E78.2 MIXED HYPERLIPIDEMIA: ICD-10-CM

## 2024-03-14 DIAGNOSIS — R73.9 HYPERGLYCEMIA: ICD-10-CM

## 2024-03-14 DIAGNOSIS — R53.82 CHRONIC FATIGUE: Primary | ICD-10-CM

## 2024-03-14 NOTE — TELEPHONE ENCOUNTER
"    Caller: Suly Ware \"Jessika\"    Relationship: Self    Best call back number: [phone]     What orders are you requesting (i.e. lab or imaging): LABS    In what timeframe would the patient need to come in: ONE WEEK PRIOR TO AWV SCHEDULED FOR 08.13.24    Where will you receive your lab/imaging services: IN OFFICE    Additional notes: PLEASE CALL PATIENT TO SCHEDULE ONCE ORDERS ARE PLACED.         "

## 2024-08-13 ENCOUNTER — OFFICE VISIT (OUTPATIENT)
Dept: FAMILY MEDICINE CLINIC | Facility: CLINIC | Age: 74
End: 2024-08-13
Payer: MEDICARE

## 2024-08-13 VITALS
OXYGEN SATURATION: 98 % | WEIGHT: 173 LBS | SYSTOLIC BLOOD PRESSURE: 152 MMHG | BODY MASS INDEX: 31.83 KG/M2 | HEART RATE: 67 BPM | TEMPERATURE: 97.1 F | DIASTOLIC BLOOD PRESSURE: 84 MMHG | HEIGHT: 62 IN

## 2024-08-13 DIAGNOSIS — Z12.11 ENCOUNTER FOR COLORECTAL CANCER SCREENING: ICD-10-CM

## 2024-08-13 DIAGNOSIS — Z00.00 MEDICARE ANNUAL WELLNESS VISIT, SUBSEQUENT: ICD-10-CM

## 2024-08-13 DIAGNOSIS — E78.2 MIXED HYPERLIPIDEMIA: ICD-10-CM

## 2024-08-13 DIAGNOSIS — M75.32 BILATERAL CALCIFIC TENDINITIS OF SHOULDERS: Primary | ICD-10-CM

## 2024-08-13 DIAGNOSIS — M70.61 TROCHANTERIC BURSITIS OF RIGHT HIP: ICD-10-CM

## 2024-08-13 DIAGNOSIS — M75.31 BILATERAL CALCIFIC TENDINITIS OF SHOULDERS: Primary | ICD-10-CM

## 2024-08-13 DIAGNOSIS — Z12.12 ENCOUNTER FOR COLORECTAL CANCER SCREENING: ICD-10-CM

## 2024-08-13 PROCEDURE — G0439 PPPS, SUBSEQ VISIT: HCPCS | Performed by: FAMILY MEDICINE

## 2024-08-13 PROCEDURE — 99214 OFFICE O/P EST MOD 30 MIN: CPT | Performed by: FAMILY MEDICINE

## 2024-08-13 PROCEDURE — 1126F AMNT PAIN NOTED NONE PRSNT: CPT | Performed by: FAMILY MEDICINE

## 2024-08-13 RX ORDER — MELOXICAM 7.5 MG/1
7.5 TABLET ORAL DAILY
Qty: 30 TABLET | Refills: 0 | Status: SHIPPED | OUTPATIENT
Start: 2024-08-13

## 2024-08-13 NOTE — PROGRESS NOTES
Subjective   The ABCs of the Annual Wellness Visit  Medicare Wellness Visit      Suly Ware is a 73 y.o. patient who presents for a Medicare Wellness Visit.    The following portions of the patient's history were reviewed and   updated as appropriate: allergies, current medications, past family history, past medical history, past social history, past surgical history, and problem list.    Compared to one year ago, the patient's physical   health is better.  Compared to one year ago, the patient's mental   health is the same.    Recent Hospitalizations:  She was not admitted to the hospital during the last year.     Current Medical Providers:  Patient Care Team:  Roberta Vazquez MD as PCP - General (Family Medicine)    Outpatient Medications Prior to Visit   Medication Sig Dispense Refill    B Complex Vitamins (vitamin b complex) capsule capsule Take  by mouth Daily.      calcium carbonate (OS-HENRY) 600 MG tablet Take 1 tablet by mouth Daily.      Magnesium 100 MG capsule Take  by mouth.      VITAMIN D PO Take 1 tablet by mouth Daily.      methylPREDNISolone (MEDROL) 4 MG dose pack Take as directed on package instructions. 21 tablet 0    diclofenac (VOLTAREN) 50 MG EC tablet Take 1 tablet by mouth 2 (Two) Times a Day As Needed (pain). 14 tablet 0    glucosamine sulfate 500 MG capsule capsule Take  by mouth 3 (Three) Times a Day With Meals.       No facility-administered medications prior to visit.     No opioid medication identified on active medication list. I have reviewed chart for other potential  high risk medication/s and harmful drug interactions in the elderly.      Aspirin is not on active medication list.  Aspirin use is not indicated based on review of current medical condition/s. Risk of harm outweighs potential benefits.  .    Patient Active Problem List   Diagnosis    Headache, migraine    DVT (deep venous thrombosis)    Chronic right shoulder pain    Influenza    Chronic fatigue    Family  "history of breast cancer- paternal aunt age 39, pt is BRCA neg.    Rosacea    Osteopenia    Depression    Screen for colon cancer    LGSIL on Pap smear of cervix    Recurrent vaginitis    Benign essential microscopic hematuria     Advance Care Planning Advance Directive is not on file.  ACP discussion was held with the patient during this visit. Patient does not have an advance directive, information provided.            Objective   Vitals:    24 0827   BP: 152/84   Pulse: 67   Temp: 97.1 °F (36.2 °C)   TempSrc: Temporal   SpO2: 98%   Weight: 78.5 kg (173 lb)   Height: 157.5 cm (62.01\")       Estimated body mass index is 31.63 kg/m² as calculated from the following:    Height as of this encounter: 157.5 cm (62.01\").    Weight as of this encounter: 78.5 kg (173 lb).    BMI is >= 30 and <35. (Class 1 Obesity). The following options were offered after discussion;: exercise counseling/recommendations and nutrition counseling/recommendations       Does the patient have evidence of cognitive impairment? No  Lab Results   Component Value Date    CHLPL 213 (H) 2024    TRIG 82 2024    HDL 67 (H) 2024     (H) 2024    VLDL 15 2024    HGBA1C 5.90 (H) 2024                                                                                                Health  Risk Assessment    Smoking Status:  Social History     Tobacco Use   Smoking Status Former    Current packs/day: 0.00    Average packs/day: 0.5 packs/day for 7.0 years (3.5 ttl pk-yrs)    Types: Cigarettes    Start date: 1978    Quit date: 1985    Years since quittin.6   Smokeless Tobacco Never     Alcohol Consumption:  Social History     Substance and Sexual Activity   Alcohol Use Not Currently    Alcohol/week: 2.0 standard drinks of alcohol    Types: 1 Cans of beer, 1 Shots of liquor per week    Comment: Stopped drinking 2022       Fall Risk Screen  STEADI Fall Risk Assessment was completed, and patient is " at LOW risk for falls.Assessment completed on:2024    Depression Screenin/13/2024     8:27 AM   PHQ-2/PHQ-9 Depression Screening   Little Interest or Pleasure in Doing Things 0-->not at all   Feeling Down, Depressed or Hopeless 0-->not at all   PHQ-9: Brief Depression Severity Measure Score 0     Health Habits and Functional and Cognitive Screenin/6/2024     8:46 AM   Functional & Cognitive Status   Do you have difficulty preparing food and eating? No   Do you have difficulty bathing yourself, getting dressed or grooming yourself? No   Do you have difficulty using the toilet? No   Do you have difficulty moving around from place to place? No   Do you have trouble with steps or getting out of a bed or a chair? No   Current Diet Well Balanced Diet   Dental Exam Up to date   Eye Exam Up to date   Exercise (times per week) 4 times per week   Current Exercises Include Kettle [place];Light Weights;Pickleball;Weightlifting   Do you need help using the phone?  No   Are you deaf or do you have serious difficulty hearing?  No   Do you need help to go to places out of walking distance? No   Do you need help shopping? No   Do you need help preparing meals?  No   Do you need help with housework?  No   Do you need help with laundry? No   Do you need help taking your medications? No   Do you need help managing money? No   Do you ever drive or ride in a car without wearing a seat belt? No   Have you felt unusual stress, anger or loneliness in the last month? No   Who do you live with? Spouse   If you need help, do you have trouble finding someone available to you? No   Have you been bothered in the last four weeks by sexual problems? No   Do you have difficulty concentrating, remembering or making decisions? No           Age-appropriate Screening Schedule:  Refer to the list below for future screening recommendations based on patient's age, sex and/or medical conditions. Orders for these recommended tests are  listed in the plan section. The patient has been provided with a written plan.    Health Maintenance List  Health Maintenance   Topic Date Due    COVID-19 Vaccine (7 - 2023-24 season) 09/01/2024    INFLUENZA VACCINE  08/01/2024    MAMMOGRAM  09/12/2024    COLORECTAL CANCER SCREENING  11/26/2024    PAP SMEAR  11/15/2024    LIPID PANEL  08/08/2025    ANNUAL WELLNESS VISIT  08/13/2025    BMI FOLLOWUP  08/13/2025    DXA SCAN  12/04/2025    TDAP/TD VACCINES (2 - Td or Tdap) 08/05/2029    HEPATITIS C SCREENING  Completed    Pneumococcal Vaccine 65+  Completed    ZOSTER VACCINE  Completed                                                                                                                                                CMS Preventative Services Quick Reference  Risk Factors Identified During Encounter  Immunizations Discussed/Encouraged: Influenza, COVID19, and RSV (Respiratory Syncytial Virus)  Vision Screening Recommended    The above risks/problems have been discussed with the patient.  Pertinent information has been shared with the patient in the After Visit Summary.  An After Visit Summary and PPPS were made available to the patient.    Follow Up:   Next Medicare Wellness visit to be scheduled in 1 year.         Additional E&M Note during same encounter follows:  Patient has additional, significant, and separately identifiable condition(s)/problem(s) that require work above and beyond the Medicare Wellness Visit     Chief Complaint  Medicare Wellness-subsequent, Hip Pain (Right hip pain ), Shoulder Pain (Bilateral shoulder pain ), and Flank Pain (Had flank pain for 2 week but pain is now gone )    Subjective   HPI  Jessika is also being seen today for additional medical problem/s.  Complaining of right hip pain , denies any injury to right hip.  Patient with history of bilateral shoulder pain was referred to physical therapy last year.  Physical therapy has helped her.  Requesting a referral to physical  "therapy.              Objective   Vital Signs:  /84   Pulse 67   Temp 97.1 °F (36.2 °C) (Temporal)   Ht 157.5 cm (62.01\")   Wt 78.5 kg (173 lb)   SpO2 98%   BMI 31.63 kg/m²   Physical Exam  Constitutional:       General: She is not in acute distress.     Appearance: Normal appearance. She is well-developed.   HENT:      Head: Normocephalic and atraumatic.      Right Ear: Tympanic membrane normal.      Left Ear: Tympanic membrane normal.      Mouth/Throat:      Mouth: Mucous membranes are moist.   Eyes:      General:         Right eye: No discharge.         Left eye: No discharge.      Extraocular Movements: Extraocular movements intact.      Pupils: Pupils are equal, round, and reactive to light.   Cardiovascular:      Rate and Rhythm: Normal rate and regular rhythm.      Pulses: Normal pulses.      Heart sounds: Normal heart sounds.   Pulmonary:      Effort: Pulmonary effort is normal.      Breath sounds: Normal breath sounds. No wheezing or rales.   Abdominal:      General: Bowel sounds are normal.      Palpations: Abdomen is soft. There is no mass.      Tenderness: There is no abdominal tenderness.   Musculoskeletal:      Cervical back: Normal range of motion and neck supple.      Right lower leg: No edema.      Left lower leg: No edema.   Lymphadenopathy:      Cervical: No cervical adenopathy.   Neurological:      General: No focal deficit present.      Mental Status: She is alert and oriented to person, place, and time.                 Assessment and Plan     The 10-year ASCVD risk score (Pushpa BARNES, et al., 2019) is: 14.3%    Values used to calculate the score:      Age: 73 years      Sex: Female      Is Non- : No      Diabetic: No      Tobacco smoker: No      Systolic Blood Pressure: 152 mmHg      Is BP treated: No      HDL Cholesterol: 67 mg/dL      Total Cholesterol: 213 mg/dL            Medicare annual wellness visit, subsequent    Mixed hyperlipidemia     Bilateral " "calcific tendinitis of shoulders    Trochanteric bursitis of right hip    Encounter for colorectal cancer screening    Suly Ware \"Jessika\" is a 73-year-old female patient seen today for  Medicare annual wellness visit and follow-up on  Hyperlipidemia patient with history of hyperlipidemia not on medication.  She is hesitant to start the statin.  I will schedule her for CT of her cardiac calcium scoring.  Trochanteric bursitis right hip and bilateral tendinitis of shoulder patient with history of on and off right hip pain and bilateral shoulder pain will refer to physical therapy.  Continue Mobic will refer to physical therapy again.  If there is no improvement I will refer to Ortho.  Orders Placed This Encounter   Procedures    CT Cardiac Calcium Score Without Dye     Standing Status:   Future     Number of Occurrences:   1     Standing Expiration Date:   8/13/2025     Order Specific Question:   Reason for Exam:     Answer:   elevated cholesterol     Order Specific Question:   Does this patient have a diabetic monitoring/medication delivering device on?     Answer:   No     Order Specific Question:   Release to patient     Answer:   Routine Release [[id number]]    Ambulatory Referral to Physical Therapy for Evaluation & Treatment     Referral Priority:   Routine     Referral Type:   Physical Therapy     Referral Reason:   Specialty Services Required     Requested Specialty:   Physical Therapy     Number of Visits Requested:   1    Ambulatory Referral to Gastroenterology     Referral Priority:   Routine     Referral Type:   Consultation     Referral Reason:   Specialty Services Required     Referred to Provider:   Pk Chamberlain MD     Requested Specialty:   Gastroenterology     Number of Visits Requested:   1     New Medications Ordered This Visit   Medications    meloxicam (Mobic) 7.5 MG tablet     Sig: Take 1 tablet by mouth Daily.     Dispense:  30 tablet     Refill:  0          Follow Up   No " follow-ups on file.  Patient was given instructions and counseling regarding her condition or for health maintenance advice. Please see specific information pulled into the AVS if appropriate.

## 2024-08-16 ENCOUNTER — TRANSCRIBE ORDERS (OUTPATIENT)
Dept: ADMINISTRATIVE | Facility: HOSPITAL | Age: 74
End: 2024-08-16
Payer: MEDICARE

## 2024-08-16 DIAGNOSIS — Z13.9 SCREENING DUE: Primary | ICD-10-CM

## 2024-08-21 ENCOUNTER — HOSPITAL ENCOUNTER (OUTPATIENT)
Facility: HOSPITAL | Age: 74
Discharge: HOME | End: 2024-08-21
Admitting: FAMILY MEDICINE

## 2024-08-21 DIAGNOSIS — E78.2 MIXED HYPERLIPIDEMIA: ICD-10-CM

## 2024-08-21 PROCEDURE — 75571 CT HRT W/O DYE W/CA TEST: CPT

## 2024-09-03 ENCOUNTER — TELEPHONE (OUTPATIENT)
Dept: GASTROENTEROLOGY | Facility: CLINIC | Age: 74
End: 2024-09-03
Payer: MEDICARE

## 2024-09-03 NOTE — TELEPHONE ENCOUNTER
5 YEAR RECALL 11/2024  LAST COLONOSCOPY 11/26/2019  PERSONAL HISTORY POLYPS  FAMILY HISTORY POLYPS, SISTER AGE 60  SCHEDULE AT [place].    NO ISSUE OR PROBLEMS PER PATIENT.    Already scheduled at [facility] on 12/02/2024 at 8am - arrive 7am.  Send prep instructions by [email or web address] 2 months prior.

## 2024-09-05 DIAGNOSIS — Z86.010 PERSONAL HISTORY OF COLONIC POLYPS: Primary | ICD-10-CM

## 2024-09-05 PROBLEM — Z86.0100 PERSONAL HISTORY OF COLONIC POLYPS: Status: ACTIVE | Noted: 2024-09-05

## 2024-09-10 DIAGNOSIS — M75.31 BILATERAL CALCIFIC TENDINITIS OF SHOULDERS: ICD-10-CM

## 2024-09-10 DIAGNOSIS — M75.32 BILATERAL CALCIFIC TENDINITIS OF SHOULDERS: ICD-10-CM

## 2024-09-10 DIAGNOSIS — M70.61 TROCHANTERIC BURSITIS OF RIGHT HIP: ICD-10-CM

## 2024-09-10 RX ORDER — MELOXICAM 7.5 MG/1
7.5 TABLET ORAL DAILY
Qty: 90 TABLET | Refills: 1 | Status: SHIPPED | OUTPATIENT
Start: 2024-09-10

## 2024-09-10 NOTE — TELEPHONE ENCOUNTER
Rx Refill Note  Requested Prescriptions     Pending Prescriptions Disp Refills    meloxicam (MOBIC) 7.5 MG tablet [Pharmacy Med Name: MELOXICAM 7.5 MG TABLET] 30 tablet 0     Sig: TAKE 1 TABLET BY MOUTH EVERY DAY      Last office visit with prescribing clinician: 8/13/2024   Last telemedicine visit with prescribing clinician: Visit date not found   Next office visit with prescribing clinician: Visit date not found                         Would you like a call back once the refill request has been completed: [] Yes [] No    If the office needs to give you a call back, can they leave a voicemail: [] Yes [] No    Jenise Gaytan MA  09/10/24, 08:36 EDT

## 2024-09-11 ENCOUNTER — HOSPITAL ENCOUNTER (OUTPATIENT)
Dept: CARDIOLOGY | Facility: HOSPITAL | Age: 74
Discharge: HOME | End: 2024-09-11
Admitting: FAMILY MEDICINE

## 2024-09-11 DIAGNOSIS — Z13.9 SCREENING DUE: ICD-10-CM

## 2024-09-11 LAB
BH CV VAS BP LEFT ARM: NORMAL MMHG
BH CV VAS BP RIGHT ARM: NORMAL MMHG
BH CV VAS SCREENING CAROTID CCA LEFT: NORMAL CM/SEC
BH CV VAS SCREENING CAROTID CCA RIGHT: NORMAL CM/SEC
BH CV VAS SCREENING CAROTID ICA LEFT: NORMAL CM/SEC
BH CV VAS SCREENING CAROTID ICA RIGHT: NORMAL CM/SEC
BH CV XLRA MEAS LEFT ICA/CCA RATIO: 1.25
BH CV XLRA MEAS LEFT PROX ECA PSV: NORMAL CM/SEC
BH CV XLRA MEAS RIGHT ICA/CCA RATIO: 1.9
BH CV XLRA MEAS RIGHT PROX ECA PSV: NORMAL CM/SEC
MAXIMAL PREDICTED HEART RATE: 149 BPM
STRESS TARGET HR: 127 BPM

## 2024-09-11 PROCEDURE — 93799 UNLISTED CV SVC/PROCEDURE: CPT

## 2024-09-12 ENCOUNTER — OFFICE VISIT (OUTPATIENT)
Dept: GASTROENTEROLOGY | Facility: CLINIC | Age: 74
End: 2024-09-12
Payer: MEDICARE

## 2024-09-12 VITALS
SYSTOLIC BLOOD PRESSURE: 142 MMHG | DIASTOLIC BLOOD PRESSURE: 84 MMHG | HEIGHT: 62 IN | BODY MASS INDEX: 32.35 KG/M2 | WEIGHT: 175.8 LBS

## 2024-09-12 DIAGNOSIS — Z86.010 PERSONAL HISTORY OF COLONIC POLYPS: Primary | ICD-10-CM

## 2024-09-12 DIAGNOSIS — K59.04 CHRONIC IDIOPATHIC CONSTIPATION: ICD-10-CM

## 2024-09-12 NOTE — PROGRESS NOTES
PATIENT INFORMATION  Suly Ware       - 1950    CHIEF COMPLAINT  Chief Complaint   Patient presents with    Colonic adenoma    Constipation    Colon Cancer Screening       HISTORY OF PRESENT ILLNESS  Her for Constipation prior to her 5 year colon for a single Adenoma in 2019    Her history of constipation is while work traveling and would only go every 3 days an be miseerable    Her answer in senior living is water fiber and exercise and isplaying Pickle ball    No supplements at this point but reviewed fiber and benefits    Reviewed NSIDS and would reattempt tylenol arthritis and use Prilosec if the NSIDS can't be stopped            REVIEWED PERTINENT RESULTS/ LABS  Lab Results   Component Value Date    CASEREPORT  2019     Surgical Pathology Report                         Case: [id number]                                  Authorizing Provider:  Brandon Nielsen        Collected:           2019 01:23 PM                                 MD Tj                                                                   Ordering Location:     [facility]  Received:            2019 02:46 PM                                 [facility]                                                                  Pathologist:           Allyson Bryan MD                                                          Specimen:    Large Intestine, Cecum, CECAL POLYP                                                        FINALDX  2019     1. Cecum, Polyp, Polypectomy:    A. Fragments of tubular adenoma.        Lab Results   Component Value Date    HGB 11.9 (L) 2024    MCV 95.2 2024     2024    ALT 21 2024    AST 20 2024    HGBA1C 5.90 (H) 2024    INR 1.0 2016    TRIG 82 2024    FERRITIN 82.40 2023    TIBC 328 2023      Vascular screening (carotid) CAR    Result Date: 2024  Narrative:   Carotid Artery Disease and Stroke  Screening: The right carotid artery has no significant carotid stenosis (less than 50%).The left carotid artery has plaque without stenosis.     CT Cardiac Calcium Score Without Dye    Result Date: 8/26/2024  Narrative:  CORONARY ARTERY CALCIUM SCORING  HISTORY: Elevated cholesterol  TECHNIQUE: Radiation dose reduction techniques were utilized, including automated exposure control and exposure modulation based on body size. Regions of interest delineated all areas of coronary artery calcification on an ECG-gated noncontrasted cardiac CT scan.  FINDINGS: These regions were quantitatively scored for calcium as follows:  Score 1: LM -- Calcium Score: 0, Volume(mm3): 0  Score 2: LAD -- Calcium Score: 0, Volume(mm3): 0  Score 3: CX -- Calcium Score: 0, Volume(mm3): 0  Score 4: RCA -- Calcium Score: 0, Volume(mm3): 0  Score 5: PDA -- Calcium Score: 0, Volume(mm3): 0  Score 6: Other -- Calcium Score: 0, Volume(mm3): 0  Total: Calcium Score: 0, Volume(mm3): 0      Impression: Total coronary artery Agatston calcification score is 0. These findings represent no identifiable coronary atherosclerotic burden.  A score of < 10 represents only a minimal coronary atherosclerotic burden with a negative predictive value of 90-95%.  A score of  represents a mild coronary atherosclerotic burden with a relative risk of death of 1.6 as compared to individuals with a score of < 10.  The risk is higher if this score is > 75% for this age group or if there is > 1 calcified vessel.  A score of 101-400 represents a moderate coronary atherosclerotic burden with a relative risk of death of 1.7 as compared to individuals with a score of < 10.  The risk is higher if this score is > 75% for this age group or if there is > 1 calcified vessel.  A score of 401-1000 represents a large coronary atherosclerotic burden with a relative risk of death of 2.5 as compared to individuals with a score of < 10.  The risk is higher if this score is > 75%  for this age group or if there is > 1 calcified vessel.  A score of > 1000 represents an extensive coronary atherosclerotic burden with a relative risk of death of 4 as compared to individuals with a score of < 10.  The risk is higher if this score is > 75% for this age group or if there is > 1 calcified vessel.     This report was finalized on 8/26/2024 10:06 PM by Dr. Jose White M.D on Workstation: [id number]       REVIEW OF SYSTEMS  Review of Systems   Constitutional:  Negative for activity change, chills, fever and unexpected weight change.   HENT:  Negative for congestion.    Eyes:  Negative for visual disturbance.   Respiratory:  Negative for shortness of breath.    Cardiovascular:  Negative for chest pain and palpitations.   Gastrointestinal:  Positive for constipation. Negative for abdominal pain and blood in stool.   Endocrine: Negative for cold intolerance and heat intolerance.   Genitourinary:  Negative for hematuria.   Musculoskeletal:  Negative for gait problem.   Skin:  Negative for color change.   Allergic/Immunologic: Negative for immunocompromised state.   Neurological:  Negative for weakness and light-headedness.   Hematological:  Negative for adenopathy.   Psychiatric/Behavioral:  Negative for sleep disturbance. The patient is not nervous/anxious.          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 [Z80.3]     Chronic fatigue [R53.82]     Influenza [J11.1]     Chronic right shoulder pain [M25.511, G89.29]     Headache, migraine [G43.909]     DVT (deep venous thrombosis) [I82.409]     Rosacea [L71.9]     Osteopenia [M85.80]     Depression [F32.A]          PAST MEDICAL HISTORY  Past Medical  History:   Diagnosis Date    Acute serous otitis media     Acute sinusitis     Allergic     Pollen, sulfites    Anxiety Januray     Arthralgia     Arthritis Aug 2022    Seems to be everywhere!    Colon polyp     Depression     DVT of lower extremity (deep venous thrombosis)     HL (hearing loss)     Less able to hear in right ear. Would like to get hearing test.    LGSIL on Pap smear of cervix 2019    Low back pain     Migraine headache     Osteopenia     PFO (patent foramen ovale)     Profound fatigue     when walking upstairs    Pulmonary embolus     from OCPs, thrombophilia w/u neg    Pulmonary embolus 2000    from OCPs, thrombophilia w/u neg    Screen for colon cancer 2019    Shoulder pain          SURGICAL HISTORY  Past Surgical History:   Procedure Laterality Date    APPENDECTOMY       SECTION      x 2    COLONOSCOPY      COLONOSCOPY N/A 2019    Procedure: COLONOSCOPY INTO CECUM & TERMINAL ILEUM  WITH COLD SNARE POLYPECTOMY;  Surgeon: Brandon Nielsen MD;  Location: [facility] ENDOSCOPY;  Service: Gastroenterology    DILATATION AND CURETTAGE      EXPLORATORY LAPAROTOMY      dermoid removed    HX OVARIAN CYSTECTOMY      TUBAL ABDOMINAL LIGATION           FAMILY HISTORY  Family History   Problem Relation Age of Onset    Stroke Mother     Hyperlipidemia Mother     Heart disease Mother     Heart attack Mother     Diabetes Mother     Osteoporosis Mother     Arthritis Mother     Asthma Mother     Depression Mother     Hearing loss Mother     Miscarriages / Stillbirths Mother     Stroke Father     Aortic aneurysm Father     Deep vein thrombosis Father     Stroke Brother     Heart disease Brother     Hyperlipidemia Brother     Hyperlipidemia Brother     Heart disease Brother     Breast cancer Maternal Aunt     No Known Problems Daughter     No Known Problems Daughter     Diabetes Brother     Hyperlipidemia Brother     Ovarian cancer Neg Hx     Colon cancer Neg Hx      "Colon polyps Neg Hx          SOCIAL HISTORY  Social History     Occupational History    Not on file   Tobacco Use    Smoking status: Former     Current packs/day: 0.00     Average packs/day: 0.5 packs/day for 7.0 years (3.5 ttl pk-yrs)     Types: Cigarettes     Start date: 1978     Quit date: 1985     Years since quittin.7    Smokeless tobacco: Never   Vaping Use    Vaping status: Never Used   Substance and Sexual Activity    Alcohol use: Not Currently     Alcohol/week: 2.0 standard drinks of alcohol     Types: 1 Cans of beer, 1 Shots of liquor per week     Comment: Stopped drinking 2022    Drug use: No    Sexual activity: Not Currently     Partners: Male     Birth control/protection: Post-menopausal, Surgical     Comment: BTL         CURRENT MEDICATIONS    Current Outpatient Medications:     B Complex Vitamins (vitamin b complex) capsule capsule, Take  by mouth Daily., Disp: , Rfl:     calcium carbonate (OS-HENRY) 600 MG tablet, Take 1 tablet by mouth Daily., Disp: , Rfl:     Magnesium 100 MG capsule, Take  by mouth., Disp: , Rfl:     meloxicam (MOBIC) 7.5 MG tablet, TAKE 1 TABLET BY MOUTH EVERY DAY, Disp: 90 tablet, Rfl: 1    VITAMIN D PO, Take 1 tablet by mouth Daily., Disp: , Rfl:     ALLERGIES  Sulfites and Bactrim [sulfamethoxazole-trimethoprim]    VITALS  Vitals:    24 0827   BP: 142/84   BP Location: Left arm   Patient Position: Sitting   Cuff Size: Adult   Weight: 79.7 kg (175 lb 12.8 oz)   Height: 157.5 cm (62\")       PHYSICAL EXAM  Debilities/Disabilities Identified: None  Emotional Behavior: Appropriate  Wt Readings from Last 3 Encounters:   24 79.7 kg (175 lb 12.8 oz)   24 78.5 kg (173 lb)   23 77.2 kg (170 lb 1.6 oz)     Ht Readings from Last 1 Encounters:   24 157.5 cm (62\")     Body mass index is 32.15 kg/m².  Physical Exam  Constitutional:       Appearance: She is well-developed. She is not diaphoretic.   HENT:      Head: Normocephalic and atraumatic. "   Eyes:      General: No scleral icterus.     Conjunctiva/sclera: Conjunctivae normal.      Pupils: Pupils are equal, round, and reactive to light.   Neck:      Thyroid: No thyromegaly.   Cardiovascular:      Rate and Rhythm: Normal rate and regular rhythm.      Heart sounds: Normal heart sounds. No murmur heard.     No gallop.   Pulmonary:      Effort: Pulmonary effort is normal.      Breath sounds: Normal breath sounds. No wheezing or rales.   Abdominal:      General: Bowel sounds are normal. There is no distension or abdominal bruit.      Palpations: Abdomen is soft. There is no shifting dullness, fluid wave or mass.      Tenderness: There is no abdominal tenderness. There is no guarding. Negative signs include Haro's sign.      Hernia: There is no hernia in the ventral area.   Musculoskeletal:         General: Normal range of motion.      Cervical back: Normal range of motion and neck supple.   Lymphadenopathy:      Cervical: No cervical adenopathy.   Skin:     General: Skin is warm and dry.      Findings: No erythema or rash.   Neurological:      Mental Status: She is alert and oriented to person, place, and time.   Psychiatric:         Mood and Affect: Mood normal.         Behavior: Behavior normal.         CLINICAL DATA REVIEWED   reviewed previous lab results and integrated with today's visit, reviewed notes from other physicians and/or last GI encounter, reviewed previous endoscopy results and available photos, reviewed surgical pathology results from previous biopsies    ASSESSMENT  Diagnoses and all orders for this visit:    Personal history of colonic polyps    Chronic idiopathic constipation          PLAN  Daily Fiber, PPI if continuing on NSAIDS and will get her collon scheduled with one day Prep    Return if symptoms worsen or fail to improve.    I have discussed the above plan with the patient.  They verbalize understanding and are in agreement with the plan.  They have been advised to contact the  office for any questions, concerns, or changes related to their health.

## 2024-11-22 ENCOUNTER — TRANSCRIBE ORDERS (OUTPATIENT)
Dept: ADMINISTRATIVE | Facility: HOSPITAL | Age: 74
End: 2024-11-22
Payer: MEDICARE

## 2024-11-22 DIAGNOSIS — Z12.31 SCREENING MAMMOGRAM FOR BREAST CANCER: Primary | ICD-10-CM

## 2024-11-28 ENCOUNTER — ANESTHESIA EVENT (OUTPATIENT)
Dept: PERIOP | Facility: HOSPITAL | Age: 74
End: 2024-11-28
Payer: MEDICARE

## 2024-12-02 ENCOUNTER — ANESTHESIA (OUTPATIENT)
Dept: PERIOP | Facility: HOSPITAL | Age: 74
End: 2024-12-02
Payer: MEDICARE

## 2024-12-02 ENCOUNTER — HOSPITAL ENCOUNTER (OUTPATIENT)
Facility: HOSPITAL | Age: 74
Setting detail: HOSPITAL OUTPATIENT SURGERY
Discharge: HOME | End: 2024-12-02
Attending: INTERNAL MEDICINE | Admitting: INTERNAL MEDICINE
Payer: MEDICARE

## 2024-12-02 VITALS
HEART RATE: 59 BPM | WEIGHT: 174.4 LBS | TEMPERATURE: 98.2 F | DIASTOLIC BLOOD PRESSURE: 94 MMHG | RESPIRATION RATE: 13 BRPM | BODY MASS INDEX: 31.9 KG/M2 | SYSTOLIC BLOOD PRESSURE: 124 MMHG | OXYGEN SATURATION: 99 %

## 2024-12-02 PROCEDURE — 25010000002 PROPOFOL 200 MG/20ML EMULSION

## 2024-12-02 PROCEDURE — G0105 COLORECTAL SCRN; HI RISK IND: HCPCS | Performed by: INTERNAL MEDICINE

## 2024-12-02 PROCEDURE — 25010000002 LIDOCAINE PF 1% 1 % SOLUTION

## 2024-12-02 RX ORDER — LIDOCAINE HYDROCHLORIDE 10 MG/ML
0.5 INJECTION, SOLUTION EPIDURAL; INFILTRATION; INTRACAUDAL; PERINEURAL ONCE AS NEEDED
Status: DISCONTINUED | OUTPATIENT
Start: 2024-12-02 | End: 2024-12-02 | Stop reason: HOSPADM

## 2024-12-02 RX ORDER — LIDOCAINE HYDROCHLORIDE 10 MG/ML
INJECTION, SOLUTION EPIDURAL; INFILTRATION; INTRACAUDAL; PERINEURAL AS NEEDED
Status: DISCONTINUED | OUTPATIENT
Start: 2024-12-02 | End: 2024-12-02 | Stop reason: SURG

## 2024-12-02 RX ORDER — SODIUM CHLORIDE 0.9 % (FLUSH) 0.9 %
10 SYRINGE (ML) INJECTION AS NEEDED
Status: DISCONTINUED | OUTPATIENT
Start: 2024-12-02 | End: 2024-12-02 | Stop reason: HOSPADM

## 2024-12-02 RX ORDER — SODIUM CHLORIDE, SODIUM LACTATE, POTASSIUM CHLORIDE, CALCIUM CHLORIDE 600; 310; 30; 20 MG/100ML; MG/100ML; MG/100ML; MG/100ML
100 INJECTION, SOLUTION INTRAVENOUS ONCE
Status: DISCONTINUED | OUTPATIENT
Start: 2024-12-02 | End: 2024-12-02 | Stop reason: HOSPADM

## 2024-12-02 RX ORDER — PROPOFOL 10 MG/ML
INJECTION, EMULSION INTRAVENOUS AS NEEDED
Status: DISCONTINUED | OUTPATIENT
Start: 2024-12-02 | End: 2024-12-02 | Stop reason: SURG

## 2024-12-02 RX ORDER — ONDANSETRON 2 MG/ML
4 INJECTION INTRAMUSCULAR; INTRAVENOUS ONCE AS NEEDED
Status: DISCONTINUED | OUTPATIENT
Start: 2024-12-02 | End: 2024-12-02 | Stop reason: HOSPADM

## 2024-12-02 RX ORDER — IBUPROFEN 200 MG
400 TABLET ORAL EVERY 6 HOURS PRN
COMMUNITY

## 2024-12-02 RX ADMIN — PROPOFOL 350 MG: 10 INJECTION, EMULSION INTRAVENOUS at 08:05

## 2024-12-02 RX ADMIN — LIDOCAINE HYDROCHLORIDE 30 MG: 10 INJECTION, SOLUTION EPIDURAL; INFILTRATION; INTRACAUDAL; PERINEURAL at 08:05

## 2024-12-02 RX ADMIN — Medication 5 ML: at 08:25

## 2024-12-02 NOTE — H&P
Patient Care Team:  Roberta Vazquez MD as PCP - General (Family Medicine)    CHIEF COMPLAINT: Personal hx colon polyps and Family hx of CRC or polyps    HISTORY OF PRESENT ILLNESS:  Last exam was     Past Medical History:   Diagnosis Date    Acute serous otitis media     Acute sinusitis     Allergic     Pollen, sulfites    Anxiety Janura2000    Arthralgia     Arthritis Aug 2022    Seems to be everywhere!    Colon polyp     Depression     DVT of lower extremity (deep venous thrombosis)     HL (hearing loss)     Less able to hear in right ear. Would like to get hearing test.    LGSIL on Pap smear of cervix 2019    Low back pain     Migraine headache     Osteopenia     PFO (patent foramen ovale)     Profound fatigue     when walking upstairs    Pulmonary embolus     from OCPs, thrombophilia w/u neg    Pulmonary embolus 2000    from OCPs, thrombophilia w/u neg    Screen for colon cancer 2019    Shoulder pain      Past Surgical History:   Procedure Laterality Date    APPENDECTOMY       SECTION      x 2    COLONOSCOPY      COLONOSCOPY N/A 2019    Procedure: COLONOSCOPY INTO CECUM & TERMINAL ILEUM  WITH COLD SNARE POLYPECTOMY;  Surgeon: Brandon Nielsen MD;  Location: [facility] ENDOSCOPY;  Service: Gastroenterology    DILATATION AND CURETTAGE      EXPLORATORY LAPAROTOMY      dermoid removed    HX OVARIAN CYSTECTOMY      TUBAL ABDOMINAL LIGATION       Family History   Problem Relation Age of Onset    Stroke Mother     Hyperlipidemia Mother     Heart disease Mother     Heart attack Mother     Diabetes Mother     Osteoporosis Mother     Arthritis Mother     Asthma Mother     Depression Mother     Hearing loss Mother     Miscarriages / Stillbirths Mother     Stroke Father     Aortic aneurysm Father     Deep vein thrombosis Father     Stroke Brother     Heart disease Brother     Hyperlipidemia Brother     Hyperlipidemia Brother     Heart disease Brother     Breast cancer  Maternal Aunt     No Known Problems Daughter     No Known Problems Daughter     Diabetes Brother     Hyperlipidemia Brother     Ovarian cancer Neg Hx     Colon cancer Neg Hx     Colon polyps Neg Hx      Social History     Tobacco Use    Smoking status: Former     Current packs/day: 0.00     Average packs/day: 0.5 packs/day for 7.0 years (3.5 ttl pk-yrs)     Types: Cigarettes     Start date: 1978     Quit date: 1985     Years since quittin.9    Smokeless tobacco: Never   Vaping Use    Vaping status: Never Used   Substance Use Topics    Alcohol use: Not Currently     Alcohol/week: 2.0 standard drinks of alcohol     Types: 1 Cans of beer, 1 Shots of liquor per week     Comment: Stopped drinking 2022    Drug use: No     Medications Prior to Admission   Medication Sig Dispense Refill Last Dose/Taking    B Complex Vitamins (vitamin b complex) capsule capsule Take  by mouth Daily.   Past Week    VITAMIN D PO Take 1 tablet by mouth Daily.   Past Week    calcium carbonate (OS-HENRY) 600 MG tablet Take 1 tablet by mouth Daily.   More than a month    ibuprofen (ADVIL,MOTRIN) 200 MG tablet Take 2 tablets by mouth Every 6 (Six) Hours As Needed for Mild Pain.   2024    Magnesium 100 MG capsule Take  by mouth.   More than a month     Allergies:  Sulfites and Bactrim [sulfamethoxazole-trimethoprim]    REVIEW OF SYSTEMS:  Please see the above history of present illness for pertinent positives and negatives.  The remainder of the patient's systems have been reviewed and are negative.     Vital Signs  Temp:  [98.2 °F (36.8 °C)] 98.2 °F (36.8 °C)  Heart Rate:  [68] 68  Resp:  [17] 17  BP: (146)/(70) 146/70    Flowsheet Rows      Flowsheet Row First Filed Value   Admission Height --   Admission Weight 79.1 kg (174 lb 6.4 oz) Documented at 2024 0706             Physical Exam:  Physical Exam   Constitutional: Patient appears well-developed and well-nourished and in no acute distress   HEENT:   Head:  Normocephalic and atraumatic.   Eyes:  Pupils are equal, round, and reactive to light. EOM are intact. Sclerae are anicteric and non-injected.  Mouth and Throat: Patient has moist mucous membranes. Oropharynx is clear of any erythema or exudate.     Neck: Neck supple. No JVD present. No thyromegaly present. No lymphadenopathy present.  Cardiovascular: Regular rate, regular rhythm, S1 normal and S2 normal.  Exam reveals no gallop and no friction rub.  No murmur heard.  Pulmonary/Chest: Lungs are clear to auscultation bilaterally. No respiratory distress. No wheezes. No rhonchi. No rales.   Abdominal: Soft. Bowel sounds are normal. No distension and no mass. There is no hepatosplenomegaly. There is no tenderness.   Musculoskeletal: Normal Muscle tone  Extremities: No edema. Pulses are palpable in all 4 extremities.  Neurological: Patient is alert and oriented to person, place, and time. Cranial nerves II-XII are grossly intact with no focal deficits.  Skin: Skin is warm. No rash noted. Nails show no clubbing.  No cyanosis or erythema.    Debilities/Disabilities Identified: None  Emotional Behavior: Appropriate     Results Review:   I reviewed the patient's new clinical results.    Lab Results (most recent)       None            Imaging Results (Most Recent)       None          reviewed    ECG/EMG Results (most recent)       None          reviewed    Assessment & Plan   Personal hx colon polyps and Family hx of CRC or polyps/  colonoscopy      I discussed the patient's findings and my recommendations with patient.     Brandon Nielsen MD  12/02/24  08:08 EST    Time: 10 min prior to procedure.

## 2024-12-02 NOTE — ANESTHESIA POSTPROCEDURE EVALUATION
Patient: Suly Ware    Procedure Summary       Date: 12/02/24 Room / Location: [facility] ENDOSCOPY 1 /  [facility]    Anesthesia Start: 0802 Anesthesia Stop: 0830    Procedure: COLONOSCOPY Diagnosis:       Personal history of colonic polyps      (Personal history of colonic polyps [Z86.010])    Surgeons: Brandon Nielsen MD Provider: Johnna Hill CRNA    Anesthesia Type: MAC ASA Status: 2            Anesthesia Type: MAC    Vitals  Vitals Value Taken Time   /94 12/02/24 0850   Temp     Pulse 59 12/02/24 0853   Resp 13 12/02/24 0834   SpO2 99 % 12/02/24 0853   Vitals shown include unfiled device data.        Post Anesthesia Care and Evaluation    Patient location during evaluation: PHASE II  Patient participation: complete - patient participated  Level of consciousness: awake  Pain score: 0  Pain management: adequate    Airway patency: patent  Anesthetic complications: No anesthetic complications  PONV Status: none  Cardiovascular status: acceptable  Respiratory status: acceptable  Hydration status: acceptable

## 2024-12-02 NOTE — ANESTHESIA PREPROCEDURE EVALUATION
Anesthesia Evaluation     Patient summary reviewed and Nursing notes reviewed   NPO Solid Status: > 8 hours  NPO Liquid Status: > 2 hours           Airway   Mallampati: II  TM distance: >3 FB  Neck ROM: full  No difficulty expected  Dental - normal exam     Pulmonary - normal exam   (+) pulmonary embolism (25 years ago), a smoker (quit 40 years ago) Former, asthma (allergic.),  Cardiovascular - normal exam  Exercise tolerance: good (4-7 METS)    ECG reviewed  Rhythm: regular  Rate: normal    (+) DVT (PE and DVT 25 years ago r/t low dose birth control)      Neuro/Psych  (+) headaches  GI/Hepatic/Renal/Endo - negative ROS     Musculoskeletal     (+) back pain, chronic pain  Abdominal  - normal exam   Substance History - negative use     OB/GYN negative ob/gyn ROS         Other   arthritis,                   Anesthesia Plan    ASA 2     MAC     intravenous induction     Anesthetic plan, risks, benefits, and alternatives have been provided, discussed and informed consent has been obtained with: patient and spouse/significant other.    Use of blood products discussed with patient and spouse/significant other  Consented to blood products.    Plan discussed with CRNA.

## 2024-12-02 NOTE — BRIEF OP NOTE
COLONOSCOPY  Progress Note    Suly Ware  12/2/2024    Pre-op Diagnosis:   Personal history of colonic polyps [Z86.010]       Post-Op Diagnosis Codes:     * Personal history of colonic polyps [Z86.0100]    Procedure/CPT® Codes:        Procedure(s):  COLONOSCOPY              Surgeon(s):  Brandon Nielsen MD    Anesthesia: Monitored Anesthesia Care    Staff:   Circulator: Carlos Alberto Reed RN  Scrub Person: Maryellen Ramsey         Estimated Blood Loss: none    Urine Voided: * No values recorded between 12/2/2024  8:00 AM and 12/2/2024  8:29 AM *    Specimens:                None          Drains: * No LDAs found *    Findings: Colon to TI good prep  Normal Mucosa thru-out        Complications: None          Brandon Nielsen MD     Date: 12/2/2024  Time: 08:30 EST

## 2025-01-23 ENCOUNTER — HOSPITAL ENCOUNTER (OUTPATIENT)
Dept: MAMMOGRAPHY | Facility: HOSPITAL | Age: 75
Discharge: HOME | End: 2025-01-23
Admitting: FAMILY MEDICINE
Payer: MEDICARE

## 2025-01-23 DIAGNOSIS — Z12.31 SCREENING MAMMOGRAM FOR BREAST CANCER: ICD-10-CM

## 2025-01-23 PROCEDURE — 77063 BREAST TOMOSYNTHESIS BI: CPT

## 2025-01-23 PROCEDURE — 77067 SCR MAMMO BI INCL CAD: CPT

## 2025-02-17 ENCOUNTER — HOSPITAL ENCOUNTER (OUTPATIENT)
Dept: GENERAL RADIOLOGY | Facility: HOSPITAL | Age: 75
Discharge: HOME | End: 2025-02-17
Admitting: FAMILY MEDICINE
Payer: MEDICARE

## 2025-02-17 ENCOUNTER — OFFICE VISIT (OUTPATIENT)
Dept: FAMILY MEDICINE CLINIC | Facility: CLINIC | Age: 75
End: 2025-02-17
Payer: MEDICARE

## 2025-02-17 ENCOUNTER — TELEPHONE (OUTPATIENT)
Dept: FAMILY MEDICINE CLINIC | Facility: CLINIC | Age: 75
End: 2025-02-17

## 2025-02-17 VITALS
BODY MASS INDEX: 31.87 KG/M2 | HEIGHT: 62 IN | WEIGHT: 173.2 LBS | RESPIRATION RATE: 14 BRPM | SYSTOLIC BLOOD PRESSURE: 116 MMHG | DIASTOLIC BLOOD PRESSURE: 70 MMHG | OXYGEN SATURATION: 98 % | HEART RATE: 65 BPM | TEMPERATURE: 98 F

## 2025-02-17 DIAGNOSIS — Z00.00 MEDICARE ANNUAL WELLNESS VISIT, SUBSEQUENT: ICD-10-CM

## 2025-02-17 DIAGNOSIS — R00.2 PALPITATION: ICD-10-CM

## 2025-02-17 DIAGNOSIS — R53.82 CHRONIC FATIGUE: ICD-10-CM

## 2025-02-17 DIAGNOSIS — R53.83 OTHER FATIGUE: ICD-10-CM

## 2025-02-17 DIAGNOSIS — R73.9 HYPERGLYCEMIA: ICD-10-CM

## 2025-02-17 DIAGNOSIS — R06.02 SOB (SHORTNESS OF BREATH) ON EXERTION: Primary | ICD-10-CM

## 2025-02-17 DIAGNOSIS — E78.2 MIXED HYPERLIPIDEMIA: ICD-10-CM

## 2025-02-17 PROCEDURE — 99214 OFFICE O/P EST MOD 30 MIN: CPT | Performed by: FAMILY MEDICINE

## 2025-02-17 PROCEDURE — 93000 ELECTROCARDIOGRAM COMPLETE: CPT | Performed by: FAMILY MEDICINE

## 2025-02-17 PROCEDURE — 1126F AMNT PAIN NOTED NONE PRSNT: CPT | Performed by: FAMILY MEDICINE

## 2025-02-17 PROCEDURE — 71046 X-RAY EXAM CHEST 2 VIEWS: CPT

## 2025-02-17 NOTE — TELEPHONE ENCOUNTER
Patient stated usually gets labs a week prior to Wellness exam. Patient scheduled 8/19/25. Patient unsure if needing labs, can we please place if appropriate? TY

## 2025-02-17 NOTE — PROGRESS NOTES
"Chief Complaint  Shortness of Breath (Getting winded really easily. Went back to  at the gym and she even noticed it. Riding upright bike is tiring but recumbent bike is okay. Been going on the last 6 months. Concerned more because  concerned) and Wheezing (Periodically hears wheezing but doesn't feel like she's wheezing.)    Subjective        Suly Ware presents to [facility]  Shortness of Breath  Associated symptoms include wheezing.   Wheezing   Associated symptoms include shortness of breath.       History of Present Illness  The patient presents for evaluation of shortness of breath.    She reports experiencing dyspnea, predominantly during physical exertion, but also occasionally during periods of rest, such as upon awakening in the morning. She is an active participant in pickleball, playing 3 to 4 days per week, and has observed a decline in her performance due to fatigue. She does not experience chest pain associated with her exertional dyspnea. She has mild wheezing, audible only in quiet environments, and intermittent palpitations, often preceded by a coughing sensation. She does not experience shortness of breath at night. She has a history of smoking, having quit approximately 50 years ago after an 8-year habit. These symptoms have been present for the past 6 months. She has recently resumed training at the gym, focusing on cycling to achieve 65 percent of her maximum heart rate.    Supplemental Information  She got hearing aids about a year ago, and they have really helped.    SOCIAL HISTORY  The patient smoked for approximately 8 years and quit around the age of 30 or 31.     Objective   Vital Signs:  /70 (BP Location: Left arm, Patient Position: Sitting, Cuff Size: Adult)   Pulse 65   Temp 98 °F (36.7 °C) (Oral)   Resp 14   Ht 157.5 cm (62\")   Wt 78.6 kg (173 lb 3.2 oz)   SpO2 98%   BMI 31.68 kg/m²   Estimated body mass index is 31.68 " "kg/m² as calculated from the following:    Height as of this encounter: 157.5 cm (62\").    Weight as of this encounter: 78.6 kg (173 lb 3.2 oz).               Physical Exam   Result Review :              ECG 12 Lead    Date/Time: 2/17/2025 1:23 PM  Performed by: Roberta Vazquez MD    Authorized by: Roberta Vazquez MD  Previous ECG: no previous ECG available  Rhythm: sinus bradycardia  Rate: bradycardic  Conduction: conduction normal  QRS axis: normal    Clinical impression: normal ECG            Assessment and Plan   Diagnoses and all orders for this visit:    1. SOB (shortness of breath) on exertion (Primary)  -     XR Chest PA & Lateral  -     ECG 12 Lead  -     Ambulatory Referral to Pulmonology  -     Ambulatory Referral to Cardiology    2. Palpitation  -     ECG 12 Lead  -     Ambulatory Referral to Cardiology    3. Other fatigue  -     Ambulatory Referral to Cardiology        Assessment & Plan  1. Dyspnea.   She reports shortness of breath primarily on exertion, with occasional episodes while at rest. There is no associated chest pain, but she does experience mild wheezing and occasional palpitations. Her thyroid function tests are within normal limits. An Electrocardiogram (EKG) will be conducted today. A chest radiograph will also be obtained. A referral to Cardiology for a stress test has been initiated. Depending on the results of the chest x-ray, an echocardiogram may be considered.            Follow Up   There are no Patient Instructions on file for this visit.   No follow-ups on file.  Patient was given instructions and counseling regarding her condition or for health maintenance advice. Please see specific information pulled into the AVS if appropriate.     Patient or patient representative verbalized consent for the use of Ambient Listening during the visit with  Roberta Vazquez MD for chart documentation. 3/2/2025  11:10 EST    "

## 2025-02-26 ENCOUNTER — TELEPHONE (OUTPATIENT)
Dept: FAMILY MEDICINE CLINIC | Facility: CLINIC | Age: 75
End: 2025-02-26
Payer: MEDICARE

## 2025-02-26 NOTE — TELEPHONE ENCOUNTER
Pt returned call about XR results - relayed Dr Radnall [facility] [place] - pt understood w/o question - she adv she spoke w/ Dr Vazquez at last appt about having a stress test done please send in referral if needed

## 2025-03-03 NOTE — TELEPHONE ENCOUNTER
LVM for pt to call back, per Dr. Vazquez, waiting for pulmonology before referring to cardio for stress test.    Jenise Crain MA  03/03/25, 09:17 EST

## 2025-03-06 ENCOUNTER — LAB (OUTPATIENT)
Dept: LAB | Facility: HOSPITAL | Age: 75
End: 2025-03-06
Payer: MEDICARE

## 2025-03-06 DIAGNOSIS — R53.83 OTHER FATIGUE: Primary | ICD-10-CM

## 2025-03-06 DIAGNOSIS — R06.09 DYSPNEA ON EXERTION: ICD-10-CM

## 2025-03-06 DIAGNOSIS — R53.83 OTHER FATIGUE: ICD-10-CM

## 2025-03-06 LAB
ALBUMIN SERPL-MCNC: 3.9 G/DL (ref 3.5–5.2)
ALBUMIN/GLOB SERPL: 1.2 G/DL
ALP SERPL-CCNC: 80 U/L (ref 39–117)
ALT SERPL W P-5'-P-CCNC: 20 U/L (ref 1–33)
ANION GAP SERPL CALCULATED.3IONS-SCNC: 9.9 MMOL/L (ref 5–15)
AST SERPL-CCNC: 23 U/L (ref 1–32)
BASOPHILS # BLD AUTO: 0.08 10*3/MM3 (ref 0–0.2)
BASOPHILS NFR BLD AUTO: 1.1 % (ref 0–1.5)
BILIRUB SERPL-MCNC: 0.3 MG/DL (ref 0–1.2)
BUN SERPL-MCNC: 17 MG/DL (ref 8–23)
BUN/CREAT SERPL: 19.5 (ref 7–25)
CALCIUM SPEC-SCNC: 9.5 MG/DL (ref 8.6–10.5)
CHLORIDE SERPL-SCNC: 105 MMOL/L (ref 98–107)
CO2 SERPL-SCNC: 25.1 MMOL/L (ref 22–29)
CREAT SERPL-MCNC: 0.87 MG/DL (ref 0.57–1)
D DIMER PPP FEU-MCNC: 0.34 MCGFEU/ML (ref 0–0.72)
DEPRECATED RDW RBC AUTO: 45.8 FL (ref 37–54)
EGFRCR SERPLBLD CKD-EPI 2021: 70.9 ML/MIN/1.73
EOSINOPHIL # BLD AUTO: 0.05 10*3/MM3 (ref 0–0.4)
EOSINOPHIL NFR BLD AUTO: 0.7 % (ref 0.3–6.2)
ERYTHROCYTE [DISTWIDTH] IN BLOOD BY AUTOMATED COUNT: 13.2 % (ref 12.3–15.4)
GLOBULIN UR ELPH-MCNC: 3.2 GM/DL
GLUCOSE SERPL-MCNC: 101 MG/DL (ref 65–99)
HCT VFR BLD AUTO: 35.7 % (ref 34–46.6)
HGB BLD-MCNC: 11.8 G/DL (ref 12–15.9)
IMM GRANULOCYTES # BLD AUTO: 0.02 10*3/MM3 (ref 0–0.05)
IMM GRANULOCYTES NFR BLD AUTO: 0.3 % (ref 0–0.5)
LYMPHOCYTES # BLD AUTO: 2.25 10*3/MM3 (ref 0.7–3.1)
LYMPHOCYTES NFR BLD AUTO: 32.1 % (ref 19.6–45.3)
MCH RBC QN AUTO: 31.1 PG (ref 26.6–33)
MCHC RBC AUTO-ENTMCNC: 33.1 G/DL (ref 31.5–35.7)
MCV RBC AUTO: 94.2 FL (ref 79–97)
MONOCYTES # BLD AUTO: 0.44 10*3/MM3 (ref 0.1–0.9)
MONOCYTES NFR BLD AUTO: 6.3 % (ref 5–12)
NEUTROPHILS NFR BLD AUTO: 4.16 10*3/MM3 (ref 1.7–7)
NEUTROPHILS NFR BLD AUTO: 59.5 % (ref 42.7–76)
NRBC BLD AUTO-RTO: 0 /100 WBC (ref 0–0.2)
PLATELET # BLD AUTO: 294 10*3/MM3 (ref 140–450)
PMV BLD AUTO: 9.3 FL (ref 6–12)
POTASSIUM SERPL-SCNC: 4.2 MMOL/L (ref 3.5–5.2)
PROT SERPL-MCNC: 7.1 G/DL (ref 6–8.5)
RBC # BLD AUTO: 3.79 10*6/MM3 (ref 3.77–5.28)
SODIUM SERPL-SCNC: 140 MMOL/L (ref 136–145)
TSH SERPL DL<=0.05 MIU/L-ACNC: 1.54 UIU/ML (ref 0.27–4.2)
WBC NRBC COR # BLD AUTO: 7 10*3/MM3 (ref 3.4–10.8)

## 2025-03-06 PROCEDURE — 84443 ASSAY THYROID STIM HORMONE: CPT

## 2025-03-06 PROCEDURE — 36415 COLL VENOUS BLD VENIPUNCTURE: CPT

## 2025-03-06 PROCEDURE — 85379 FIBRIN DEGRADATION QUANT: CPT

## 2025-03-06 PROCEDURE — 80053 COMPREHEN METABOLIC PANEL: CPT

## 2025-03-06 PROCEDURE — 85025 COMPLETE CBC W/AUTO DIFF WBC: CPT

## 2025-03-20 ENCOUNTER — HOSPITAL ENCOUNTER (OUTPATIENT)
Dept: CARDIOLOGY | Facility: HOSPITAL | Age: 75
Discharge: HOME | End: 2025-03-20
Admitting: FAMILY MEDICINE
Payer: MEDICARE

## 2025-03-20 VITALS
BODY MASS INDEX: 31.83 KG/M2 | DIASTOLIC BLOOD PRESSURE: 50 MMHG | HEIGHT: 62 IN | HEART RATE: 62 BPM | WEIGHT: 173 LBS | SYSTOLIC BLOOD PRESSURE: 112 MMHG

## 2025-03-20 DIAGNOSIS — R00.2 PALPITATION: ICD-10-CM

## 2025-03-20 DIAGNOSIS — R53.83 OTHER FATIGUE: ICD-10-CM

## 2025-03-20 DIAGNOSIS — R06.02 SOB (SHORTNESS OF BREATH) ON EXERTION: ICD-10-CM

## 2025-03-20 LAB
AORTIC ARCH: 3.5 CM
AORTIC DIMENSIONLESS INDEX: 0.68 (DI)
ASCENDING AORTA: 2.6 CM
AV MEAN PRESS GRAD SYS DOP V1V2: 5 MMHG
AV VMAX SYS DOP: 159 CM/SEC
BH CV ECHO MEAS - ACS: 2.04 CM
BH CV ECHO MEAS - AO MAX PG: 10.1 MMHG
BH CV ECHO MEAS - AO ROOT AREA (BSA CORRECTED): 1.4 CM2
BH CV ECHO MEAS - AO ROOT DIAM: 2.6 CM
BH CV ECHO MEAS - AO V2 VTI: 35.5 CM
BH CV ECHO MEAS - AVA(I,D): 2.28 CM2
BH CV ECHO MEAS - EDV(CUBED): 97.3 ML
BH CV ECHO MEAS - EDV(MOD-SP2): 75 ML
BH CV ECHO MEAS - EDV(MOD-SP4): 94 ML
BH CV ECHO MEAS - EF(MOD-SP2): 69.3 %
BH CV ECHO MEAS - EF(MOD-SP4): 63.8 %
BH CV ECHO MEAS - ESV(CUBED): 26.8 ML
BH CV ECHO MEAS - ESV(MOD-SP2): 23 ML
BH CV ECHO MEAS - ESV(MOD-SP4): 34 ML
BH CV ECHO MEAS - FS: 35 %
BH CV ECHO MEAS - IVS/LVPW: 1 CM
BH CV ECHO MEAS - IVSD: 1 CM
BH CV ECHO MEAS - LAT PEAK E' VEL: 12.6 CM/SEC
BH CV ECHO MEAS - LV DIASTOLIC VOL/BSA (35-75): 51.7 CM2
BH CV ECHO MEAS - LV MASS(C)D: 158.8 GRAMS
BH CV ECHO MEAS - LV MAX PG: 4.2 MMHG
BH CV ECHO MEAS - LV MEAN PG: 2 MMHG
BH CV ECHO MEAS - LV SYSTOLIC VOL/BSA (12-30): 18.7 CM2
BH CV ECHO MEAS - LV V1 MAX: 103 CM/SEC
BH CV ECHO MEAS - LV V1 VTI: 24.3 CM
BH CV ECHO MEAS - LVIDD: 4.6 CM
BH CV ECHO MEAS - LVIDS: 3 CM
BH CV ECHO MEAS - LVOT AREA: 3.3 CM2
BH CV ECHO MEAS - LVOT DIAM: 2.06 CM
BH CV ECHO MEAS - LVPWD: 1 CM
BH CV ECHO MEAS - MED PEAK E' VEL: 13.6 CM/SEC
BH CV ECHO MEAS - MV A DUR: 0.17 SEC
BH CV ECHO MEAS - MV A MAX VEL: 111 CM/SEC
BH CV ECHO MEAS - MV DEC SLOPE: 912.8 CM/SEC2
BH CV ECHO MEAS - MV DEC TIME: 0.17 SEC
BH CV ECHO MEAS - MV E MAX VEL: 112 CM/SEC
BH CV ECHO MEAS - MV E/A: 1.01
BH CV ECHO MEAS - MV MAX PG: 8.9 MMHG
BH CV ECHO MEAS - MV MEAN PG: 3.6 MMHG
BH CV ECHO MEAS - MV P1/2T: 47.9 MSEC
BH CV ECHO MEAS - MV V2 VTI: 42.6 CM
BH CV ECHO MEAS - MVA(P1/2T): 4.6 CM2
BH CV ECHO MEAS - MVA(VTI): 1.9 CM2
BH CV ECHO MEAS - PA ACC TIME: 0.13 SEC
BH CV ECHO MEAS - PA V2 MAX: 88.2 CM/SEC
BH CV ECHO MEAS - PULM A REVS DUR: 0.15 SEC
BH CV ECHO MEAS - PULM A REVS VEL: 38.8 CM/SEC
BH CV ECHO MEAS - PULM DIAS VEL: 49.5 CM/SEC
BH CV ECHO MEAS - PULM S/D: 1.1
BH CV ECHO MEAS - PULM SYS VEL: 54.3 CM/SEC
BH CV ECHO MEAS - QP/QS: 0.49
BH CV ECHO MEAS - RAP SYSTOLE: 3 MMHG
BH CV ECHO MEAS - RV MAX PG: 2.17 MMHG
BH CV ECHO MEAS - RV V1 MAX: 73.7 CM/SEC
BH CV ECHO MEAS - RV V1 VTI: 16.3 CM
BH CV ECHO MEAS - RVOT DIAM: 1.76 CM
BH CV ECHO MEAS - RVSP: 28.8 MMHG
BH CV ECHO MEAS - SUP REN AO DIAM: 1.8 CM
BH CV ECHO MEAS - SV(LVOT): 80.9 ML
BH CV ECHO MEAS - SV(MOD-SP2): 52 ML
BH CV ECHO MEAS - SV(MOD-SP4): 60 ML
BH CV ECHO MEAS - SV(RVOT): 39.5 ML
BH CV ECHO MEAS - SVI(LVOT): 44.5 ML/M2
BH CV ECHO MEAS - SVI(MOD-SP2): 28.6 ML/M2
BH CV ECHO MEAS - SVI(MOD-SP4): 33 ML/M2
BH CV ECHO MEAS - TAPSE (>1.6): 2.8 CM
BH CV ECHO MEAS - TR MAX PG: 25.8 MMHG
BH CV ECHO MEAS - TR MAX VEL: 253.9 CM/SEC
BH CV ECHO MEASUREMENTS AVERAGE E/E' RATIO: 8.55
BH CV XLRA - RV BASE: 3.1 CM
BH CV XLRA - RV LENGTH: 7.2 CM
BH CV XLRA - RV MID: 2.6 CM
BH CV XLRA - TDI S': 15 CM/SEC
LEFT ATRIUM VOLUME INDEX: 27.9 ML/M2
LV EF BIPLANE MOD: 67.6 %
SINUS: 2.49 CM
STJ: 2.31 CM

## 2025-03-20 PROCEDURE — 93306 TTE W/DOPPLER COMPLETE: CPT

## 2025-03-26 ENCOUNTER — TELEPHONE (OUTPATIENT)
Dept: FAMILY MEDICINE CLINIC | Facility: CLINIC | Age: 75
End: 2025-03-26
Payer: MEDICARE

## 2025-03-28 ENCOUNTER — OFFICE VISIT (OUTPATIENT)
Age: 75
End: 2025-03-28
Payer: MEDICARE

## 2025-03-28 VITALS
HEART RATE: 67 BPM | HEIGHT: 62 IN | BODY MASS INDEX: 31.65 KG/M2 | SYSTOLIC BLOOD PRESSURE: 112 MMHG | DIASTOLIC BLOOD PRESSURE: 60 MMHG | OXYGEN SATURATION: 97 % | WEIGHT: 172 LBS

## 2025-03-28 DIAGNOSIS — Z87.891 FORMER SMOKER: ICD-10-CM

## 2025-03-28 DIAGNOSIS — R06.09 DOE (DYSPNEA ON EXERTION): Primary | ICD-10-CM

## 2025-03-28 PROCEDURE — 1160F RVW MEDS BY RX/DR IN RCRD: CPT | Performed by: STUDENT IN AN ORGANIZED HEALTH CARE EDUCATION/TRAINING PROGRAM

## 2025-03-28 PROCEDURE — 1159F MED LIST DOCD IN RCRD: CPT | Performed by: STUDENT IN AN ORGANIZED HEALTH CARE EDUCATION/TRAINING PROGRAM

## 2025-03-28 PROCEDURE — 99204 OFFICE O/P NEW MOD 45 MIN: CPT | Performed by: STUDENT IN AN ORGANIZED HEALTH CARE EDUCATION/TRAINING PROGRAM

## 2025-03-28 RX ORDER — MULTIPLE VITAMINS W/ MINERALS TAB 9MG-400MCG
1 TAB ORAL DAILY
COMMUNITY

## 2025-03-28 NOTE — PROGRESS NOTES
Subjective:     Encounter Date:2025      Patient ID: Suly Ware is a 74 y.o. female.    Chief Complaint:  Dyspnea on exertion    HPI:   74 y.o. female with prior DVT in the setting of OCP use many years ago, former smoker (quit 40 years ago) who presents for evaluation of dyspnea on exertion.  Patient was initially seen in our office in  with Dr. Whittington and at the time complained of dyspnea.  She had a stress echo done at that time which was normal.  More recently, she went back to a  who noticed that she was more winded than usual.  Patient notes that she has not been to the gym in a while and had noticed that she was not doing as well during her pickleball games.  She ended to her primary care for further evaluation and underwent an echocardiogram which was normal.  Chest x-ray was also normal other than mild atelectatic changes.  Of note, patient had a calcium score of 0 in .    Smokin/2 ppd for 9-10 years, quit 40 years ago  History: Older Brother- CABG/heart surgery      The following portions of the patient's history were reviewed and updated as appropriate: allergies, current medications, past family history, past medical history, past social history, past surgical history and problem list.     REVIEW OF SYSTEMS:   All systems reviewed.  Pertinent positives identified in HPI.  All other systems are negative.    Past Medical History:   Diagnosis Date    Acute serous otitis media     Acute sinusitis     Allergic     Pollen, sulfites    Anxiety kiah2000    Arthralgia     Arthritis Aug 2022    Seems to be everywhere!    Colon polyp     Depression     DVT of lower extremity (deep venous thrombosis)     HL (hearing loss)     Less able to hear in right ear. Would like to get hearing test.    LGSIL on Pap smear of cervix 2019    Low back pain     Migraine headache     Osteopenia     PFO (patent foramen ovale)     Profound fatigue     when walking upstairs     Pulmonary embolus     from OCPs, thrombophilia w/u neg    Pulmonary embolus 2000    from OCPs, thrombophilia w/u neg    Screen for colon cancer 2019    Shoulder pain        Family History   Problem Relation Age of Onset    Stroke Mother     Hyperlipidemia Mother     Heart disease Mother     Heart attack Mother     Diabetes Mother     Osteoporosis Mother     Arthritis Mother     Asthma Mother     Depression Mother     Hearing loss Mother     Miscarriages / Stillbirths Mother     Stroke Father     Aortic aneurysm Father     Deep vein thrombosis Father     Stroke Brother     Heart disease Brother     Hyperlipidemia Brother     Hyperlipidemia Brother     Heart disease Brother     Breast cancer Maternal Aunt     No Known Problems Daughter     No Known Problems Daughter     Diabetes Brother     Hyperlipidemia Brother     Ovarian cancer Neg Hx     Colon cancer Neg Hx     Colon polyps Neg Hx        Social History     Socioeconomic History    Marital status:    Tobacco Use    Smoking status: Former     Current packs/day: 0.00     Average packs/day: 0.5 packs/day for 7.0 years (3.5 ttl pk-yrs)     Types: Cigarettes     Start date: 1978     Quit date: 1985     Years since quittin.2    Smokeless tobacco: Never   Vaping Use    Vaping status: Never Used   Substance and Sexual Activity    Alcohol use: Not Currently     Alcohol/week: 2.0 standard drinks of alcohol     Types: 1 Cans of beer, 1 Shots of liquor per week     Comment: Stopped drinking 2022    Drug use: No    Sexual activity: Not Currently     Partners: Male     Birth control/protection: Post-menopausal, Surgical     Comment: BTL       Allergies   Allergen Reactions    Sulfites Anaphylaxis    Bactrim [Sulfamethoxazole-Trimethoprim] Hives       Past Surgical History:   Procedure Laterality Date    APPENDECTOMY       SECTION      x 2    COLONOSCOPY      COLONOSCOPY N/A 2019    Procedure: COLONOSCOPY INTO CECUM &  TERMINAL ILEUM  WITH COLD SNARE POLYPECTOMY;  Surgeon: Brandon Nielsen MD;  Location: [facility] ENDOSCOPY;  Service: Gastroenterology    COLONOSCOPY N/A 12/2/2024    Procedure: COLONOSCOPY;  Surgeon: Brandon Nielsen MD;  Location:  [facility] OR;  Service: Gastroenterology;  Laterality: N/A;    DILATATION AND CURETTAGE      EXPLORATORY LAPAROTOMY      dermoid removed    HX OVARIAN CYSTECTOMY      TUBAL ABDOMINAL LIGATION         Procedures       Objective:         Vitals:    03/28/25 1439   BP: 112/60   Pulse: 67   SpO2: 97%       PHYSICAL EXAM:  GEN: well appearing, in NAD   HEENT: NCAT, EOMI, moist mucus membranes   Respiratory: CTAB, no wheezes, rales or rhonchi  CV: normal rate, regular rhythm, normal S1, S2, no murmurs, rubs, gallops, +2 radial pulses b/l  GI: soft, nontender, nondistended  MSK: no edema  Skin: no rash, warm, dry  Heme/Lymph: no bruising or bleeding  Neuro: Alert and Oriented x 3, grossly normal motor function        Assessment:         (R06.09) LOPEZ (dyspnea on exertion)    (Z87.891) Former smoker    74 y.o. female with prior DVT in the setting of OCP use many years ago, former smoker (quit 40 years ago) who presents for evaluation of dyspnea on exertion.       Plan:       #Dyspnea on exertion  Patient's dyspnea is likely secondary to deconditioning.  Her calcium score was 0 in August 2024.  Her echocardiogram recently was normal.  She is a prior smoker but quit 40 years ago and otherwise has no risk factors for CAD.  Would continue her exercise regimen and slowly ramp up her cardio.  If she notices that her symptoms have not improved, can consider stress testing at that time.    Roberta Vazquez MD, thank you very much for referring this kind patient to me. Please call me with any questions or concerns. I will see the patient again in the office as needed         Cory Dunn MD, [facility], [facility]  03/28/25  [facility]    Outpatient Encounter Medications as of  3/28/2025   Medication Sig Dispense Refill    B Complex Vitamins (vitamin b complex) capsule capsule Take  by mouth Daily.      calcium carbonate (OS-HENRY) 600 MG tablet Take 1 tablet by mouth Daily.      ibuprofen (ADVIL,MOTRIN) 200 MG tablet Take 2 tablets by mouth Every 6 (Six) Hours As Needed for Mild Pain.      Magnesium 100 MG capsule Take  by mouth.      multivitamin with minerals tablet tablet Take 1 tablet by mouth Daily.      VITAMIN D PO Take 1 tablet by mouth Daily.       No facility-administered encounter medications on file as of 3/28/2025.

## 2025-03-31 NOTE — TELEPHONE ENCOUNTER
"Name: JeanieSuly \"Jessika\"      Relationship: Self      Best Callback Number:     [phone]          HUB PROVIDED THE RELAY MESSAGE FROM THE OFFICE      PATIENT: VOICED UNDERSTANDING AND HAS NO FURTHER QUESTIONS AT THIS TIME    ADDITIONAL INFORMATION: PATIENT HAD NO QUESTIONS ABOUT THESE RESULTS, BUT DID WT TO OFFICE TO DISCUSS OTHER RESULTS  "

## 2025-08-07 DIAGNOSIS — E78.2 MIXED HYPERLIPIDEMIA: ICD-10-CM

## 2025-08-07 DIAGNOSIS — R73.9 HYPERGLYCEMIA: ICD-10-CM

## 2025-08-07 DIAGNOSIS — R53.82 CHRONIC FATIGUE: ICD-10-CM

## 2025-08-13 LAB
ALBUMIN SERPL-MCNC: 3.9 G/DL (ref 3.5–5.2)
ALBUMIN/GLOB SERPL: 1.6 G/DL
ALP SERPL-CCNC: 80 U/L (ref 39–117)
ALT SERPL-CCNC: 16 U/L (ref 1–33)
AST SERPL-CCNC: 19 U/L (ref 1–32)
BASOPHILS # BLD AUTO: 0.07 10*3/MM3 (ref 0–0.2)
BASOPHILS NFR BLD AUTO: 0.9 % (ref 0–1.5)
BILIRUB SERPL-MCNC: 0.2 MG/DL (ref 0–1.2)
BUN SERPL-MCNC: 16 MG/DL (ref 8–23)
BUN/CREAT SERPL: 16.7 (ref 7–25)
CALCIUM SERPL-MCNC: 9 MG/DL (ref 8.6–10.5)
CHLORIDE SERPL-SCNC: 106 MMOL/L (ref 98–107)
CHOLEST SERPL-MCNC: 208 MG/DL (ref 0–200)
CO2 SERPL-SCNC: 28.1 MMOL/L (ref 22–29)
CREAT SERPL-MCNC: 0.96 MG/DL (ref 0.57–1)
EGFRCR SERPLBLD CKD-EPI 2021: 63 ML/MIN/1.73
EOSINOPHIL # BLD AUTO: 0.28 10*3/MM3 (ref 0–0.4)
EOSINOPHIL NFR BLD AUTO: 3.6 % (ref 0.3–6.2)
ERYTHROCYTE [DISTWIDTH] IN BLOOD BY AUTOMATED COUNT: 13.2 % (ref 12.3–15.4)
GLOBULIN SER CALC-MCNC: 2.4 GM/DL
GLUCOSE SERPL-MCNC: 89 MG/DL (ref 65–99)
HBA1C MFR BLD: 5.4 % (ref 4.8–5.6)
HCT VFR BLD AUTO: 37.4 % (ref 34–46.6)
HDLC SERPL-MCNC: 65 MG/DL (ref 40–60)
HGB BLD-MCNC: 11.6 G/DL (ref 12–15.9)
IMM GRANULOCYTES # BLD AUTO: 0.02 10*3/MM3 (ref 0–0.05)
IMM GRANULOCYTES NFR BLD AUTO: 0.3 % (ref 0–0.5)
LDLC SERPL CALC-MCNC: 126 MG/DL (ref 0–100)
LYMPHOCYTES # BLD AUTO: 2.15 10*3/MM3 (ref 0.7–3.1)
LYMPHOCYTES NFR BLD AUTO: 27.9 % (ref 19.6–45.3)
MCH RBC QN AUTO: 30.8 PG (ref 26.6–33)
MCHC RBC AUTO-ENTMCNC: 31 G/DL (ref 31.5–35.7)
MCV RBC AUTO: 99.2 FL (ref 79–97)
MONOCYTES # BLD AUTO: 0.46 10*3/MM3 (ref 0.1–0.9)
MONOCYTES NFR BLD AUTO: 6 % (ref 5–12)
NEUTROPHILS # BLD AUTO: 4.73 10*3/MM3 (ref 1.7–7)
NEUTROPHILS NFR BLD AUTO: 61.3 % (ref 42.7–76)
NRBC BLD AUTO-RTO: 0 /100 WBC (ref 0–0.2)
PLATELET # BLD AUTO: 269 10*3/MM3 (ref 140–450)
POTASSIUM SERPL-SCNC: 4.5 MMOL/L (ref 3.5–5.2)
PROT SERPL-MCNC: 6.3 G/DL (ref 6–8.5)
RBC # BLD AUTO: 3.77 10*6/MM3 (ref 3.77–5.28)
SODIUM SERPL-SCNC: 142 MMOL/L (ref 136–145)
TRIGL SERPL-MCNC: 96 MG/DL (ref 0–150)
TSH SERPL DL<=0.005 MIU/L-ACNC: 2.04 UIU/ML (ref 0.27–4.2)
VLDLC SERPL CALC-MCNC: 17 MG/DL (ref 5–40)
WBC # BLD AUTO: 7.71 10*3/MM3 (ref 3.4–10.8)

## (undated) DEVICE — JACKT LAB F/R KNIT CUFF/COLR XLG BLU

## (undated) DEVICE — KT ORCA ORCAPOD DISP STRL

## (undated) DEVICE — BW-412T DISP COMBO CLEANING BRUSH: Brand: SINGLE USE COMBINATION CLEANING BRUSH

## (undated) DEVICE — TUBING, SUCTION, 1/4" X 10', STRAIGHT: Brand: MEDLINE

## (undated) DEVICE — ADAPT CLN BIOGUARD AIR/H2O DISP

## (undated) DEVICE — GLV SURG SENSICARE PI MIC PF SZ8 LF STRL

## (undated) DEVICE — THE TORRENT IRRIGATION SCOPE CONNECTOR IS USED WITH THE TORRENT IRRIGATION TUBING TO PROVIDE IRRIGATION FLUIDS SUCH AS STERILE WATER DURING GASTROINTESTINAL ENDOSCOPIC PROCEDURES WHEN USED IN CONJUNCTION WITH AN IRRIGATION PUMP (OR ELECTROSURGICAL UNIT).: Brand: TORRENT

## (undated) DEVICE — CANN NASL CO2 TRULINK W/O2 A/

## (undated) DEVICE — SENSR O2 OXIMAX FNGR A/ 18IN NONSTR

## (undated) DEVICE — HYBRID TUBING/CAP SET FOR OLYMPUS® SCOPES: Brand: ERBE

## (undated) DEVICE — SOL IRR H2O BO 1000ML STRL

## (undated) DEVICE — SNAR POLYP SENSATION STDOVL 27 240 BX40

## (undated) DEVICE — LINER SURG CANSTR SXN S/RIGD 1500CC

## (undated) DEVICE — KT VLV BIOGUARD SXN BIOP AIR/H20 CONN 4PC DISP

## (undated) DEVICE — THE SINGLE USE ETRAP – POLYP TRAP IS USED FOR SUCTION RETRIEVAL OF ENDOSCOPICALLY REMOVED POLYPS.: Brand: ETRAP

## (undated) DEVICE — Device